# Patient Record
Sex: FEMALE | Race: WHITE | NOT HISPANIC OR LATINO | Employment: FULL TIME | URBAN - METROPOLITAN AREA
[De-identification: names, ages, dates, MRNs, and addresses within clinical notes are randomized per-mention and may not be internally consistent; named-entity substitution may affect disease eponyms.]

---

## 2017-07-05 ENCOUNTER — OFFICE VISIT (OUTPATIENT)
Dept: OBGYN | Facility: CLINIC | Age: 27
End: 2017-07-05
Payer: COMMERCIAL

## 2017-07-05 VITALS
HEART RATE: 96 BPM | BODY MASS INDEX: 35.95 KG/M2 | TEMPERATURE: 97.9 F | HEIGHT: 68 IN | SYSTOLIC BLOOD PRESSURE: 131 MMHG | WEIGHT: 237.2 LBS | DIASTOLIC BLOOD PRESSURE: 83 MMHG

## 2017-07-05 DIAGNOSIS — N91.2 ABSENCE OF MENSTRUATION: Primary | ICD-10-CM

## 2017-07-05 LAB
B-HCG SERPL-ACNC: ABNORMAL IU/L (ref 0–5)
BETA HCG QUAL IFA URINE: POSITIVE

## 2017-07-05 PROCEDURE — 99203 OFFICE O/P NEW LOW 30 MIN: CPT | Performed by: NURSE PRACTITIONER

## 2017-07-05 PROCEDURE — 84702 CHORIONIC GONADOTROPIN TEST: CPT | Performed by: NURSE PRACTITIONER

## 2017-07-05 PROCEDURE — 36415 COLL VENOUS BLD VENIPUNCTURE: CPT | Performed by: NURSE PRACTITIONER

## 2017-07-05 PROCEDURE — 84703 CHORIONIC GONADOTROPIN ASSAY: CPT | Performed by: NURSE PRACTITIONER

## 2017-07-05 ASSESSMENT — PAIN SCALES - GENERAL: PAINLEVEL: NO PAIN (0)

## 2017-07-05 NOTE — PROGRESS NOTES
S: Pt is a 27 year old,  2 para 1 who presents today with absence of menses. LMP-unsure-positive home test 1 week ago.  Nothing for contraception. Not actively planning, but not preventing pregnancy. Happy to be pregnant.  Complains of nausea.  Previous Pap smear 2013.-NIL Prior history of PAUL 2 and 3-no follow up since 2013.  Pertinent Past Obstetric and Gynecological Medical History:  x 1, SAB x 1   Patient past medical, surgical, family, and social history reviewed.    O: General appearance: Well appearing female in no acute distress. Answers questions and maintains eye contact appropriately.  RESPIRATORY: Clear to auscultation bilaterally.  CV: Regular rate and rhythm without murmur, gallop, rub  ABDOMEN: Soft, nontender, nondistended, normoactive bowel sounds. No hepatosplenomegaly. No guarding, rebounding, or rigidity.  UPT Positive    A: Missed Menses  Weeks gestation: ultrasound to determine  MARIA ALEJANDRA: ultrasound to determine    P: 1) Prenatal vitamins - will start  2) Diet, exercise, work, and environmental hazards reviewed. Discussed delivery hospital, providers, prenatal visit schedule. Quant HCG today to get baseline GA and if high enough, early ultrasound ordered to confirm dates. Toxoplasmosis precautions NA. Discussed avoidance of tobacco, ETOH, and street drugs. Signs and symptoms to monitor for and report discussed. Will schedule first OB visit at 10-12 weeks gestation. Discussed nausea relief measures and when to call if they are not helping or if she has vomiting.     Rain BUSCH CNP

## 2017-07-05 NOTE — NURSING NOTE
"Chief Complaint   Patient presents with     Confirmation Of Pregnancy     + home upt       Initial /83  Pulse 96  Temp 97.9  F (36.6  C) (Oral)  Ht 5' 8\" (1.727 m)  Wt 237 lb 3.2 oz (107.6 kg)  LMP  (LMP Unknown)  BMI 36.07 kg/m2 Estimated body mass index is 36.07 kg/(m^2) as calculated from the following:    Height as of this encounter: 5' 8\" (1.727 m).    Weight as of this encounter: 237 lb 3.2 oz (107.6 kg)..  BP completed using cuff size: regular on forearm      Leah Puente CMA    "

## 2017-07-05 NOTE — MR AVS SNAPSHOT
After Visit Summary   7/5/2017    Naheed Crouch    MRN: [id number]           Patient Information     Date Of Birth          1990        Visit Information        Provider Department      7/5/2017 8:50 AM Rain Yip APRN CNP [facility]        Today's Diagnoses     Absence of menstruation    -  1       Follow-ups after your visit        Future tests that were ordered for you today     Open Future Orders        Priority Expected Expires Ordered    US OB < 14 Weeks Single Routine  8/19/2017 7/5/2017            Who to contact     If you have questions or need follow up information about today's clinic visit or your schedule please contact [facility] directly at [phone].  Normal or non-critical lab and imaging results will be communicated to you by MyChart, letter or phone within 4 business days after the clinic has received the results. If you do not hear from us within 7 days, please contact the clinic through [employer] or phone. If you have a critical or abnormal lab result, we will notify you by phone as soon as possible.  Submit refill requests through IQMS or call your pharmacy and they will forward the refill request to us. Please allow 3 business days for your refill to be completed.          Additional Information About Your Visit        MyChart Information     IQMS gives you secure access to your electronic health record. If you see a primary care provider, you can also send messages to your care team and make appointments. If you have questions, please call your primary care clinic.  If you do not have a primary care provider, please call [phone] and they will assist you.        Care EveryWhere ID     This is your Care EveryWhere ID. This could be used by other organizations to access your [place] medical records  [id number]        Your Vitals Were     Pulse Temperature Height Last Period BMI (Body Mass Index)       96 97.9  F (36.6  " C) (Oral) 5' 8\" (1.727 m) (LMP Unknown) 36.07 kg/m2        Blood Pressure from Last 3 Encounters:   07/05/17 131/83   07/15/13 108/60   06/12/13 120/75    Weight from Last 3 Encounters:   07/05/17 237 lb 3.2 oz (107.6 kg)   07/15/13 242 lb 12.8 oz (110.1 kg)   06/12/13 242 lb (109.8 kg)              We Performed the Following     Beta HCG qual IFA urine     HCG quantitative pregnancy          Today's Medication Changes          These changes are accurate as of: 7/5/17  9:10 AM.  If you have any questions, ask your nurse or doctor.               Stop taking these medicines if you haven't already. Please contact your care team if you have questions.     NO ACTIVE MEDICATIONS   Stopped by:  Rain Yip APRN CNP                    Primary Care Provider    Md Other Clinic                Equal Access to Services     [facility]: Hadii ryan Piña, waaxda constantine, elizabethybashley kaalmabob bee, kevin waldron . So [facility] [phone].    ATENCIÓN: Si habla español, tiene a ledezma disposición servicios gratuitos de asistencia lingüística. Llame al [phone].    We comply with applicable federal civil rights laws and [place] laws. We do not discriminate on the basis of race, color, national origin, age, disability sex, sexual orientation or gender identity.            Thank you!     Thank you for choosing [facility]  [facility] your [facility]. Our goal is always to provide you with excellent care. Hearing back from our patients is one way we can continue to improve our services. Please take a few minutes to complete the written survey that you may receive in the mail after your visit with us. Thank you!             Your Updated Medication List - Protect others around you: Learn how to safely use, store and throw away your medicines at www.disposemymeds.org.      Notice  As of 7/5/2017  9:10 AM    You have not been prescribed any medications.      "

## 2017-07-06 ENCOUNTER — RADIANT APPOINTMENT (OUTPATIENT)
Dept: ULTRASOUND IMAGING | Facility: CLINIC | Age: 27
End: 2017-07-06
Attending: NURSE PRACTITIONER
Payer: COMMERCIAL

## 2017-07-06 DIAGNOSIS — N91.2 ABSENCE OF MENSTRUATION: ICD-10-CM

## 2017-07-06 PROCEDURE — 76801 OB US < 14 WKS SINGLE FETUS: CPT

## 2017-07-28 ENCOUNTER — TELEPHONE (OUTPATIENT)
Dept: OBGYN | Facility: CLINIC | Age: 27
End: 2017-07-28

## 2017-07-28 NOTE — TELEPHONE ENCOUNTER
Patient calling is 11 weeks pregnant and had some vaginal bleeding last night has stopped now, no other symptoms.  Would like a call back from nurse as soon as possible please.

## 2017-07-28 NOTE — TELEPHONE ENCOUNTER
"MARIA ALEJANDRA 02-17-18 10w 6d  Return call to patient. Patient stated she was walking through the store last noc and had \"one gush of blood that was reddish/brownish\" so she cleaned up in the public restroom and \"it seemed like a lot but probably not really.\" Patient stated she has had no abdominal cramping/pain. No other sx's. No recent IC-probably a wk ago. Explained no red flag sx's at this time. Recommended pelvic rest. Recommended she call back if sx's change. Recommended she schedule an appt for FOB. Scheduled per request with NARAYAN Fong, CNP on 08-02-17 at 1350. Patient appreciative of assistance. Elo Gutierrez, RN, BAN      "

## 2017-07-30 ENCOUNTER — NURSE TRIAGE (OUTPATIENT)
Dept: NURSING | Facility: CLINIC | Age: 27
End: 2017-07-30

## 2017-07-30 NOTE — TELEPHONE ENCOUNTER
", 11 wks MARIA ALEJANDRA 18.  Had gush of dark brownish-red blood x1 on . See note from . Today for past 30 min pt having red vaginal bleeding. More than spotting but less than a period. \"about 30 drops\"  Denies any abdominal pain/cramping. Says she has chronic lower backache but no increase or change in this whatsoever. No clots or tissue. Denies feeling dizzy or lightheaded, no other sx. US at 8 wks showed intrauterine pregnancy. Will see OB doctor tomorrow. Will call back tonight if abd/pelvic/back pain, increased bleeding or any other change. Elmira Miller RN/FNA    Reason for Disposition    MILD vaginal bleeding (i.e., clots or similar to menstrual period; not just spotting)    Additional Information    Negative: Shock suspected (e.g., cold/pale/clammy skin, too weak to stand, low BP, rapid pulse)    Negative: Difficult to awaken or acting confused  (e.g., disoriented, slurred speech)    Negative: Passed out (i.e., lost consciousness, collapsed and was not responding)    Negative: Sounds like a life-threatening emergency to the triager    Negative: [1] Vaginal bleeding AND [2] pregnant > 20 weeks    Negative: Not pregnant or pregnancy status unknown    Negative: SEVERE abdominal pain    Negative: [1] SEVERE vaginal bleeding (i.e., soaking 2 pads / hour, large blood clots) AND [2] present 2 or more hours    Negative: [1] MODERATE vaginal bleeding (i.e., soaking 1 pad / hour; clots) AND [2] present > 6 hours    Negative: [1] MODERATE vaginal bleeding (i.e., soaking 1 pad / hour; clots) AND [2] pregnant > 12 weeks    Negative: Passed tissue (e.g., gray-white)    Negative: Shoulder pain    Negative: Pale skin (pallor) of new onset or worsening    Negative: Patient sounds very sick or weak to the triager    Negative: [1] Constant abdominal pain AND [2] present > 2 hours    Negative: Fever > 100.4 F (38.0 C)    Negative: [1] Intermittent lower abdominal pain (e.g., cramping) AND [2] present > 24 hours    " "Negative: Prior history of \"ectopic pregnancy\" or previous tubal surgery (e.g., tubal ligation)    Negative: Burning with urination    Negative: Has IUD    Protocols used: PREGNANCY - VAGINAL BLEEDING LESS THAN 20 WEEKS [facility]    "

## 2017-07-31 ENCOUNTER — PRENATAL OFFICE VISIT (OUTPATIENT)
Dept: OBGYN | Facility: CLINIC | Age: 27
End: 2017-07-31
Payer: COMMERCIAL

## 2017-07-31 ENCOUNTER — TELEPHONE (OUTPATIENT)
Dept: OBGYN | Facility: CLINIC | Age: 27
End: 2017-07-31

## 2017-07-31 ENCOUNTER — RESULT FOLLOW UP (OUTPATIENT)
Dept: OBGYN | Facility: CLINIC | Age: 27
End: 2017-07-31

## 2017-07-31 VITALS
TEMPERATURE: 97.8 F | WEIGHT: 238.2 LBS | DIASTOLIC BLOOD PRESSURE: 75 MMHG | HEIGHT: 68 IN | BODY MASS INDEX: 36.1 KG/M2 | HEART RATE: 87 BPM | SYSTOLIC BLOOD PRESSURE: 123 MMHG

## 2017-07-31 DIAGNOSIS — Z34.80 ENCOUNTER FOR SUPERVISION OF OTHER NORMAL PREGNANCY: Primary | ICD-10-CM

## 2017-07-31 DIAGNOSIS — D06.9 SEVERE DYSPLASIA OF CERVIX (CIN III): ICD-10-CM

## 2017-07-31 DIAGNOSIS — D06.9 SEVERE DYSPLASIA OF CERVIX: ICD-10-CM

## 2017-07-31 LAB
BASOPHILS # BLD AUTO: 0 10E9/L (ref 0–0.2)
BASOPHILS NFR BLD AUTO: 0.4 %
DIFFERENTIAL METHOD BLD: ABNORMAL
EOSINOPHIL # BLD AUTO: 0.1 10E9/L (ref 0–0.7)
EOSINOPHIL NFR BLD AUTO: 2 %
ERYTHROCYTE [DISTWIDTH] IN BLOOD BY AUTOMATED COUNT: 16.8 % (ref 10–15)
HCT VFR BLD AUTO: 33.9 % (ref 35–47)
HGB BLD-MCNC: 11.1 G/DL (ref 11.7–15.7)
LYMPHOCYTES # BLD AUTO: 2.2 10E9/L (ref 0.8–5.3)
LYMPHOCYTES NFR BLD AUTO: 31 %
MCH RBC QN AUTO: 28 PG (ref 26.5–33)
MCHC RBC AUTO-ENTMCNC: 32.7 G/DL (ref 31.5–36.5)
MCV RBC AUTO: 85 FL (ref 78–100)
MONOCYTES # BLD AUTO: 0.4 10E9/L (ref 0–1.3)
MONOCYTES NFR BLD AUTO: 6.2 %
NEUTROPHILS # BLD AUTO: 4.2 10E9/L (ref 1.6–8.3)
NEUTROPHILS NFR BLD AUTO: 60.4 %
PLATELET # BLD AUTO: 278 10E9/L (ref 150–450)
RBC # BLD AUTO: 3.97 10E12/L (ref 3.8–5.2)
WBC # BLD AUTO: 7 10E9/L (ref 4–11)

## 2017-07-31 PROCEDURE — 87086 URINE CULTURE/COLONY COUNT: CPT | Performed by: NURSE PRACTITIONER

## 2017-07-31 PROCEDURE — 86900 BLOOD TYPING SEROLOGIC ABO: CPT | Performed by: NURSE PRACTITIONER

## 2017-07-31 PROCEDURE — 86901 BLOOD TYPING SEROLOGIC RH(D): CPT | Performed by: NURSE PRACTITIONER

## 2017-07-31 PROCEDURE — 36415 COLL VENOUS BLD VENIPUNCTURE: CPT | Performed by: NURSE PRACTITIONER

## 2017-07-31 PROCEDURE — 87389 HIV-1 AG W/HIV-1&-2 AB AG IA: CPT | Performed by: NURSE PRACTITIONER

## 2017-07-31 PROCEDURE — 87624 HPV HI-RISK TYP POOLED RSLT: CPT | Performed by: NURSE PRACTITIONER

## 2017-07-31 PROCEDURE — 87491 CHLMYD TRACH DNA AMP PROBE: CPT | Performed by: NURSE PRACTITIONER

## 2017-07-31 PROCEDURE — 86780 TREPONEMA PALLIDUM: CPT | Performed by: NURSE PRACTITIONER

## 2017-07-31 PROCEDURE — 87340 HEPATITIS B SURFACE AG IA: CPT | Performed by: NURSE PRACTITIONER

## 2017-07-31 PROCEDURE — G0145 SCR C/V CYTO,THINLAYER,RESCR: HCPCS | Performed by: NURSE PRACTITIONER

## 2017-07-31 PROCEDURE — 86762 RUBELLA ANTIBODY: CPT | Performed by: NURSE PRACTITIONER

## 2017-07-31 PROCEDURE — 86850 RBC ANTIBODY SCREEN: CPT | Performed by: NURSE PRACTITIONER

## 2017-07-31 PROCEDURE — 99207 ZZC FIRST OB VISIT: CPT | Performed by: NURSE PRACTITIONER

## 2017-07-31 PROCEDURE — 85025 COMPLETE CBC W/AUTO DIFF WBC: CPT | Performed by: NURSE PRACTITIONER

## 2017-07-31 PROCEDURE — 87591 N.GONORRHOEAE DNA AMP PROB: CPT | Performed by: NURSE PRACTITIONER

## 2017-07-31 ASSESSMENT — PAIN SCALES - GENERAL: PAINLEVEL: NO PAIN (0)

## 2017-07-31 NOTE — NURSING NOTE
"Chief Complaint   Patient presents with     Prenatal Care     FOB     Vaginal Bleeding     x 4 days       Initial /75  Pulse 87  Temp 97.8  F (36.6  C) (Oral)  Ht 5' 8\" (1.727 m)  Wt 238 lb 3.2 oz (108 kg)  LMP  (LMP Unknown)  BMI 36.22 kg/m2 Estimated body mass index is 36.22 kg/(m^2) as calculated from the following:    Height as of this encounter: 5' 8\" (1.727 m).    Weight as of this encounter: 238 lb 3.2 oz (108 kg)..  BP completed using cuff size: large    [facility] expectant family book and hospital information given to patient      Leah Russroxann CMA      "

## 2017-07-31 NOTE — LETTER
August 9, 2018      Naheed Coleman  [address] [address] [address]    Dear ,      This letter is to remind you that you are due for your follow up PAP smear and HPV test on or about 7/31/18.    Please call [phone] to schedule your appointment at your earliest convenience.     If you have completed the tests outside of [place], please have the results forwarded to our office. We will update the chart for your primary Physician to review before your next annual physical.     Sincerely,      NARAYAN Rebolledo CNP/rlm

## 2017-07-31 NOTE — TELEPHONE ENCOUNTER
Patient is 11 week 1 day based on the previous ob ultrasound.  She had a gush of blood 3 days ago when she was out shopping   Yesterday she noticed a small amount of spotting.  The blood was bright red at the beginning but is now dark brown in color.  She denied pelvic pain, cramping or passing blood clots.  I advised pelvic rest including no intercourse or tampons, no heavy lifting or strenuous exercise.  I placed patient with Rain today for the new prenatal visit.  I cancelled her appointment originally scheduled for Wednesday.  Patient denied having intercourse in the past week.  Julissa Cid RN

## 2017-07-31 NOTE — PROGRESS NOTES
Naheed is a 27 year old  @ 11 weeks here for new OB visit.  She had called with a gush of bleeding a few days ago-that was bright red and had nothing further. Yesterday noticed a small amount of spotting that was bright red, dark brown by night. No recent intercourse, does lift her son, but nothing more than that.   She is unsure of her blood type.     See OB questionnaire for pertinent components of HPI.    OBhx:  x 1  SAB x 1  Gyne: prior LEEP for PAUL 3 in .  Past Medical History:   Diagnosis Date     ASCUS with positive high risk HPV 2012    types 16, 53 & 59     H/O colposcopy with cervical biopsy 2013    PAUL 2 & 3     Tobacco use disorder      Past Surgical History:   Procedure Laterality Date     LEEP TX, CERVICAL  2013    PAUL 3     Patient Active Problem List    Diagnosis Date Noted     Encounter for supervision of other normal pregnancy 2017     Priority: Medium     Missed  07/15/2013     Priority: Medium     Severe dysplasia of cervix 2013     Priority: Medium     Tobacco use disorder      Priority: Medium     ASCUS on Pap smear, HPV positive, repeat PP 2012     Priority: Medium     ASCUS Pap, + HPV at previous clinic.   12:Pap - ASCUS, + HR HPV 16, 53 & 59. Plan colp-  13:[place] - PAUL 2 & 3. Plan LEEP  13:LEEP - PAUL 3. Repeat pap 3 months. Reminder in epic.   13:Pap - NIL. Repeat pap 6 months. Reminder in epic.             Allergies   Allergen Reactions     Amoxicillin Nausea and Vomiting     Current Outpatient Prescriptions   Medication Sig Dispense Refill     Prenatal Vit-Fe Fumarate-FA (PRENATAL VITAMIN PO) Take 1 tablet by mouth daily         Review of Systems:     CONSTITUTIONAL:NEGATIVE for fever, chills, change in weight  INTEGUMENTARY/SKIN: NEGATIVE for worrisome rashes, moles or lesions  EYES: NEGATIVE for vision changes or irritation  ENT/MOUTH: NEGATIVE for ear, mouth and throat problems  RESP:NEGATIVE for significant cough or  "SOB  BREAST: NEGATIVE for masses, tenderness or discharge  CV: NEGATIVE for chest pain, palpitations or peripheral edema  GI: NEGATIVE for nausea, abdominal pain, heartburn, or change in bowel habits  : See above  NEURO: NEGATIVE for weakness, dizziness or paresthesias  HEME/ALLERGY/IMMUNE: NEGATIVE for bleeding problems  PSYCHIATRIC: NEGATIVE for changes in mood or affect      Past Medical History of Father of Baby: No significant medical history  History Since Last Menstrual Period: Bleeding-see above    Physical Exam: /75  Pulse 87  Temp 97.8  F (36.6  C) (Oral)  Ht 5' 8\" (1.727 m)  Wt 238 lb 3.2 oz (108 kg)  LMP  (LMP Unknown)  BMI 36.22 kg/m2  General: Well developed, well nourished female  Skin: Normal  HEENT: Normal  Neck: Supple,no adenopathy,thyroid normal  Chest: Clear to auscultation  Heart: Regular rate, rhythm,No murmur, rub, gallop  Abdomen: Benign and No masses, organomegaly    Extremities: Normal  Neurological: Normal   Perineum: Intact   Vulva: Normal  Vagina: Normal mucosa, scant blood  Cervix: Parous, closed, mobile, no discharge  Uterus: 11 weeks, Normal shape, position and consistency   Adnexa: Normal  Bony Pelvis: Adequate     A/P 27 year old  at 11 weeks  Discussed physician coverage, tertiary support, diet, exercise, weight gain, schedule of visits, routine and indicated ultrasounds, and childbirth education.  Prenatal labs: Prenatal Panel, GC, Chlamydia, Urine Culture, Pap smear.  Continue taking prenatal vitamins  Discussed maternal quad screening between 15-20 weeks and reviewed benefits and limitations.    Patient is reassured upon hearing FHTs. We discussed red flag symptoms to monitor for and report. Reviewed when she would need to proceed to ED. Discussed no intercourse, heavy lifting (other than son), strenuous activity for the next 2 weeks. Await blood type to determine if we need to give Rhogam for the bleeding. Patient is given an opportunity to ask questions " and have them answered.    Also reviewed her history of LEEP and questions discussed.     Rain BUSCH CNP

## 2017-07-31 NOTE — MR AVS SNAPSHOT
"              After Visit Summary   7/31/2017    Naheed Crouch    MRN: [id number]           Patient Information     Date Of Birth          1990        Visit Information        Provider Department      7/31/2017 12:10 PM Rain Yip APRN CNP [facility]        Today's Diagnoses     Encounter for supervision of other normal pregnancy    -  1    Severe dysplasia of cervix           Follow-ups after your visit        Who to contact     If you have questions or need follow up information about today's clinic visit or your schedule please contact [facility] directly at [phone].  Normal or non-critical lab and imaging results will be communicated to you by 8aweekhart, letter or phone within 4 business days after the clinic has received the results. If you do not hear from us within 7 days, please contact the clinic through 8aweekhart or phone. If you have a critical or abnormal lab result, we will notify you by phone as soon as possible.  Submit refill requests through [employer] or call your pharmacy and they will forward the refill request to us. Please allow 3 business days for your refill to be completed.          Additional Information About Your Visit        MyChart Information     [employer] gives you secure access to your electronic health record. If you see a primary care provider, you can also send messages to your care team and make appointments. If you have questions, please call your primary care clinic.  If you do not have a primary care provider, please call [phone] and they will assist you.        Care EveryWhere ID     This is your Care EveryWhere ID. This could be used by other organizations to access your [place] medical records  [id number]        Your Vitals Were     Pulse Temperature Height Last Period BMI (Body Mass Index)       87 97.8  F (36.6  C) (Oral) 5' 8\" (1.727 m) (LMP Unknown) 36.22 kg/m2        Blood Pressure from Last 3 Encounters:   07/31/17 " 123/75   07/05/17 131/83   07/15/13 108/60    Weight from Last 3 Encounters:   07/31/17 238 lb 3.2 oz (108 kg)   07/05/17 237 lb 3.2 oz (107.6 kg)   07/15/13 242 lb 12.8 oz (110.1 kg)              We Performed the Following     ABO/Rh type and screen     Anti Treponema     CBC with platelets differential     Chlamydia trachomatis PCR     Hepatitis B surface antigen     HIV Antigen Antibody Combo     HPV High Risk Types DNA Cervical     Neisseria gonorrhoeae PCR     Pap imaged thin layer diagnostic only     Rubella Antibody IgG Quantitative     Urine Culture Aerobic Bacterial        Primary Care Provider    Md Other Clinic                Equal Access to Services     JACKI BROWN : Hadmonica Piña, christal fitch, jarocho bee, kevin waldron . So [facility] [phone].    ATENCIÓN: Si habla español, tiene a ledezma disposición servicios gratuitos de asistencia lingüística. Llame al [phone].    We comply with applicable federal civil rights laws and [place] laws. We do not discriminate on the basis of race, color, national origin, age, disability sex, sexual orientation or gender identity.            Thank you!     [facility] you for [facility]  for your care. Our goal is always to provide you with excellent care. Hearing back from our patients is one way we can continue to improve our services. Please take a few minutes to complete the written survey that you may receive in the mail after your visit with us. Thank you!             Your Updated Medication List - Protect others around you: Learn how to safely use, store and throw away your medicines at www.disposemymeds.org.          This list is accurate as of: 7/31/17  1:10 PM.  Always use your most recent med list.                   Brand Name Dispense Instructions for use Diagnosis    PRENATAL VITAMIN PO      Take 1 tablet by mouth daily

## 2017-08-01 LAB
ABO + RH BLD: NORMAL
ABO + RH BLD: NORMAL
BACTERIA SPEC CULT: NORMAL
BLD GP AB SCN SERPL QL: NORMAL
BLOOD BANK CMNT PATIENT-IMP: NORMAL
C TRACH DNA SPEC QL NAA+PROBE: NORMAL
HBV SURFACE AG SERPL QL IA: NONREACTIVE
HIV 1+2 AB+HIV1 P24 AG SERPL QL IA: NORMAL
MICRO REPORT STATUS: NORMAL
N GONORRHOEA DNA SPEC QL NAA+PROBE: NORMAL
RUBV IGG SERPL IA-ACNC: 15 IU/ML
SPECIMEN EXP DATE BLD: NORMAL
SPECIMEN SOURCE: NORMAL

## 2017-08-02 LAB
COPATH REPORT: NORMAL
PAP: NORMAL
T PALLIDUM IGG+IGM SER QL: NEGATIVE

## 2017-08-04 LAB
FINAL DIAGNOSIS: NORMAL
HPV HR 12 DNA CVX QL NAA+PROBE: NEGATIVE
HPV16 DNA SPEC QL NAA+PROBE: NEGATIVE
HPV18 DNA SPEC QL NAA+PROBE: NEGATIVE
SPECIMEN DESCRIPTION: NORMAL

## 2017-08-10 NOTE — PROGRESS NOTES
ASCUS Pap, + HPV at previous clinic.   11/6/12:Pap - ASCUS, + HR HPV 16, 53 & 59. Plan colp-  1/28/13:[place] - PAUL 2 & 3. Plan LEEP  2/25/13:LEEP - PAUL 3. Repeat pap 3 months.    6/12/13:Pap - NIL. Repeat pap 6 months.    7/31/17 NIL Pap, neg HPV. Plan: cotest in 1 year  8/9/18 My Chart cotest reminder message sent ([facility])  02/18/19 [facility] clinic and schedule. ([facility])  03/04/19 Patient is lost to pap tracking follow-up. FYI routed to provider. ([facility])

## 2017-08-16 ENCOUNTER — MYC MEDICAL ADVICE (OUTPATIENT)
Dept: OBGYN | Facility: CLINIC | Age: 27
End: 2017-08-16

## 2017-08-17 NOTE — TELEPHONE ENCOUNTER
Noted patient was seen by NARAYAN Fong CNP on 07-31-17 and reported bleeding.   No future appt scheduled.  Will route to NARAYAN Fong CNP for review & orders. Elo Gutierrez RN, BAN

## 2017-08-30 ENCOUNTER — NURSE TRIAGE (OUTPATIENT)
Dept: NURSING | Facility: CLINIC | Age: 27
End: 2017-08-30

## 2017-08-31 ENCOUNTER — RADIANT APPOINTMENT (OUTPATIENT)
Dept: ULTRASOUND IMAGING | Facility: CLINIC | Age: 27
End: 2017-08-31
Attending: OBSTETRICS & GYNECOLOGY
Payer: COMMERCIAL

## 2017-08-31 ENCOUNTER — OFFICE VISIT (OUTPATIENT)
Dept: OBGYN | Facility: CLINIC | Age: 27
End: 2017-08-31
Payer: COMMERCIAL

## 2017-08-31 DIAGNOSIS — O41.8X20: ICD-10-CM

## 2017-08-31 DIAGNOSIS — Z34.80 ENCOUNTER FOR SUPERVISION OF OTHER NORMAL PREGNANCY: Primary | ICD-10-CM

## 2017-08-31 DIAGNOSIS — O20.0 THREATENED ABORTION: ICD-10-CM

## 2017-08-31 DIAGNOSIS — Z34.80 ENCOUNTER FOR SUPERVISION OF OTHER NORMAL PREGNANCY: ICD-10-CM

## 2017-08-31 DIAGNOSIS — N93.9 VAGINAL BLEEDING: ICD-10-CM

## 2017-08-31 PROCEDURE — 76816 OB US FOLLOW-UP PER FETUS: CPT

## 2017-08-31 PROCEDURE — 99214 OFFICE O/P EST MOD 30 MIN: CPT | Performed by: OBSTETRICS & GYNECOLOGY

## 2017-08-31 NOTE — PROGRESS NOTES
Naheed is a 27 year old  at 15.4 weeks, referred here by self for consultation regarding 2 weeks vaginal bleeding. Initially was dark brown.Yesterday it became bright red. She denies any pain or cramps.  Here with spouse.    ROS: Ten point review of systems was reviewed and negative except the above.    Gyne: - abn pap (last pap ), - STD's    Past Medical History:   Diagnosis Date     ASCUS with positive high risk HPV 2012    types 16, 53 & 59     H/O colposcopy with cervical biopsy 2013    PAUL 2 & 3     Tobacco use disorder      Past Surgical History:   Procedure Laterality Date     LEEP TX, CERVICAL  2013    PAUL 3     Patient Active Problem List   Diagnosis     Severe dysplasia of cervix (PAUL III)     Tobacco use disorder     Severe dysplasia of cervix     Missed      Encounter for supervision of other normal pregnancy       ALL/Meds: Her medication and allergy histories were reviewed and are documented in their appropriate chart areas.    SH: - tob, - EtOH,     FH: Her family history was reviewed and documented in its appropriate chart area.    PE: LMP  (LMP Unknown)  There is no height or weight on file to calculate BMI.      General:  WNWD female, NAD  Alert  Oriented x 3  Gait:  Normal  Skin:  Normal skin turgor  HEENT:  NC/AT, EOMI  Abdomen:  Non-tender, non-distended.  Vulva: No external lesions, normal hair distribution, no adenopathy  BUS:  Normal, no masses noted  Urethra:  No hypermobility seen  Urethral meatus:  No masses noted  Vagina:SSE:  Dark old blood.  Cervix:closed with dark small bleeding. Smooth, pink, no visible lesions  Uterus: Normal 15 wk, anteverted, non-tender, mobile    Extremities:  No clubbing, no cyanosis and no edema.    Positive FHR  ULTRASOUND OBSTETRIC SINGLE FOLLOW UP REPEAT  2017 1:56 PM     HISTORY: Encounter for supervision of other normal pregnancy,  unspecified trimester. Abnormal uterine and vaginal bleeding,  unspecified. Threatened  .     COMPARISON: First trimester ultrasound 2017.     FINDINGS:      Presentation: Breech.  Cardiac activity: 160 bpm. Regular rhythm.  Movement: Unremarkable.  Placenta: Anterior and fundal. No evidence for placenta previa.  Adnexa: Unremarkable.   Cervical length: 3.4 cm.   Amniotic fluid: Unremarkable.   MAUREEN: 9.0 cm.      Other findings: Subchorionic hemorrhage is noted measuring 4.2 x 4.8  x 1.7 cm.  A complete anatomy scan was not performed.      Measured parameters:       BPD:  3.2 cm      Age: 16 weeks 1 day.       HC:    11.5 cm      Age: 15 weeks 5 days.       AC:  9.7 cm      Age: 15 weeks 6 days.       FL:   2.0 cm      Age: 16 weeks 0 days.     Gestational age by current ultrasound measurement: 16 weeks 0 days,  corresponding to an MARIA ALEJANDRA of 2/15/2018.     Gestational age based on the reported previously established due date:  15 weeks 5 days, corresponding to an MARIA ALEJANDRA of 2018.     Estimated fetal weight: 138 grams, corresponding to the 52nd  percentile based on the reported previously established due date.          IMPRESSION:    1. Single live intrauterine pregnancy of 16 weeks 0 days gestation by  current ultrasound measurement. Fetal growth is 2 days more advanced  than what is expected from the reported previously established due  date.  2. Subchorionic hemorrhage. Follow-up as clinically warranted.      Findings were called to Dr. Agbeh by the ultrasound technologist at  the time of the exam, 1:40 PM on 2017.     CEZAR GREWAL MD    A/P      ICD-10-CM    1. Encounter for supervision of other normal pregnancy Z34.80 US OB Ltd One Or More Fetus FU/Repeat   2. Vaginal bleeding N93.9 US OB Ltd One Or More Fetus FU/Repeat   3. Threatened  O20.0 US OB Ltd One Or More Fetus FU/Repeat   4. Subchorionic hemorrhage, second trimester, not applicable or unspecified fetus O41.8X20      Discussed ultrasound and pelvic exam .  Pelvic rest.  Limit lifting to 10 lb.    return to clinic 1-2  weeks for next OBV.    25 minutes was spent face to face with the patient today discussing her history, diagnosis, and follow-up plan as noted above.  Over 50% of the visit was spent in counseling and coordination of care.    Total Visit Time: 30 minutes.      CEPHAS AGBEH, MD.      CEPHAS AGBEH, MD.

## 2017-08-31 NOTE — TELEPHONE ENCOUNTER
"  Reason for Disposition    MILD vaginal bleeding (i.e., clots or similar to menstrual period; not just spotting)    Additional Information    Negative: Shock suspected (e.g., cold/pale/clammy skin, too weak to stand, low BP, rapid pulse)    Negative: Difficult to awaken or acting confused  (e.g., disoriented, slurred speech)    Negative: Passed out (i.e., lost consciousness, collapsed and was not responding)    Negative: Sounds like a life-threatening emergency to the triager    Negative: [1] Vaginal bleeding AND [2] pregnant > 20 weeks    Negative: Not pregnant or pregnancy status unknown    Negative: SEVERE abdominal pain    Negative: [1] SEVERE vaginal bleeding (i.e., soaking 2 pads / hour, large blood clots) AND [2] present 2 or more hours    Negative: [1] MODERATE vaginal bleeding (i.e., soaking 1 pad / hour; clots) AND [2] present > 6 hours    Negative: [1] MODERATE vaginal bleeding (i.e., soaking 1 pad / hour; clots) AND [2] pregnant > 12 weeks    Negative: Passed tissue (e.g., gray-white)    Negative: Shoulder pain    Negative: Pale skin (pallor) of new onset or worsening    Negative: Patient sounds very sick or weak to the triager    Negative: [1] Constant abdominal pain AND [2] present > 2 hours    Negative: Fever > 100.4 F (38.0 C)    Negative: [1] Intermittent lower abdominal pain (e.g., cramping) AND [2] present > 24 hours    Negative: Prior history of \"ectopic pregnancy\" or previous tubal surgery (e.g., tubal ligation)    Negative: Burning with urination    Negative: Has IUD    Answer Assessment - Initial Assessment Questions  1. ONSET: \"When did this bleeding start?\"        08/15/17  2. DESCRIPTION: \"Describe the bleeding that you are having.\" \"How much bleeding is there?\"     - SPOTTING: spotting, or pinkish / brownish mucous discharge; does not fill panti-liner or pad     - MILD:  less than 1 pad / hour; less than patient's usual menstrual bleeding    - MODERATE: 1-2 pads / hour; small-medium blood " "clots (e.g., pea, grape, small coin)     - SEVERE: soaking 2 or more pads/hour for 2 or more hours; bleeding not contained by pads or continuous red blood from vagina; large blood clots (e.g., golf ball, large coin)       Mild dark red, was brown spotting from 08/15/17 to today when it seems to be darker and more than spotting  3. ABDOMINAL PAIN SEVERITY: If present, ask: \"How bad is it?\"  (e.g., Scale 1-10; mild, moderate, or severe)    - MILD (1-3): doesn't interfere with normal activities, abdomen soft and not tender to touch     - MODERATE (4-7): interferes with normal activities or awakens from sleep, tender to touch     - SEVERE (8-10): excruciating pain, doubled over, unable to do any normal activities      No pain  4. PREGNANCY: \"Do you know how many weeks or months pregnant you are?\" \"When was the first day of your last normal menstrual period?\"      15 weeks  5. HEMODYNAMIC STATUS: \"Are you weak or feeling lightheaded?\" If so, ask: \"Can you stand and walk normally?\"       No symptoms  6. OTHER SYMPTOMS: \"What other symptoms are you having with the bleeding?\" (e.g., passed tissue, vaginal discharge, fever, menstrual-type cramps)      No other symptoms, no passed tissue, no cramping, pain, just the bleeding.    Protocols used: PREGNANCY - VAGINAL BLEEDING LESS THAN 20 WEEKS EGA-ADULT-AH    She agreed to transfer to [facility] and got appointment tomorrow at 11:30 with provider at [facility].    Barb Stack, RN, BSN  [facility]    "

## 2017-09-01 ENCOUNTER — TELEPHONE (OUTPATIENT)
Dept: OBGYN | Facility: CLINIC | Age: 27
End: 2017-09-01

## 2017-09-01 NOTE — TELEPHONE ENCOUNTER
Patient called with vaginal bleeding and passing blood clots.  She is wearing an Always thin pad and has filled 1/2 the pad 2 times.  She noticed 3 blood clots in the toilet silver dollar size.  No tissue noted.  The bleeding has slowed now.  She denied abdominal pain or cramping.  She was seen in clinic yesterday and was given pelvic precautions.  She understands nothing placed in the vagina, no heavy lifting-greater than 10 pounds or strenuous exercise. Pateint is home resting.  She will continue to observe and call back with an update.   Julissa Cid RN

## 2017-09-01 NOTE — TELEPHONE ENCOUNTER
Patient states she saw you for bleeding yesterday and wants to know if the amount of bleeding she is having today is normal.  Please call.    Thank you.

## 2017-09-01 NOTE — TELEPHONE ENCOUNTER
Patient called back with an update.  Her flow is a very mild spotting now.  She will continue to monitor the bleeding and will call the clinic back if she has concerns.  I have already discussed pelvic rest which is what she will do.  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  Julissa Cid RN

## 2017-09-18 ENCOUNTER — PRENATAL OFFICE VISIT (OUTPATIENT)
Dept: OBGYN | Facility: CLINIC | Age: 27
End: 2017-09-18
Payer: COMMERCIAL

## 2017-09-18 VITALS
DIASTOLIC BLOOD PRESSURE: 66 MMHG | SYSTOLIC BLOOD PRESSURE: 124 MMHG | HEART RATE: 89 BPM | BODY MASS INDEX: 36.46 KG/M2 | TEMPERATURE: 97.7 F | OXYGEN SATURATION: 98 % | WEIGHT: 239.8 LBS

## 2017-09-18 DIAGNOSIS — Z34.80 ENCOUNTER FOR SUPERVISION OF OTHER NORMAL PREGNANCY: Primary | ICD-10-CM

## 2017-09-18 PROCEDURE — 99207 ZZC PRENATAL VISIT: CPT | Performed by: OBSTETRICS & GYNECOLOGY

## 2017-09-18 NOTE — PROGRESS NOTES
18w2d. Doing well without issues/concerns.  Quad screen not done per pt request .  Routine anticipatory guidance.  U/S  Ordered. return to clinic in 4 weeks.    ICD-10-CM    1. Encounter for supervision of other normal pregnancy Z34.80 US OB > 14 Weeks Complete Single     CEPHAS AGBEH, MD.

## 2017-09-18 NOTE — PATIENT INSTRUCTIONS
If you have any questions regarding your visit, Please contact your care team.    Women s Health CLINIC HOURS TELEPHONE NUMBER   Bridgets Agbeh, M.D.    Casandra Costa- RONEN Gasca - RONEN Daniel -         Monday-[facility]  8:00 am - 5 pm  Tuesday- [facility]  8:00am- 5 pm  Wednesday- Off  Thursday- [facility]  8:00 am- 5 pm  Friday-[place]  8:00 am 5 pm [facility]  [address] [place] [address]  [phone] ask for Women's [facility]    Imaging [phone]    [facility]  [address]  [place] [id number] [phone]  Imaging [phone]     Urgent Care locations:    [facility] Saturday and Sunday   9 am - 5 pm    Monday-Friday   12 pm - 8 pm  Saturday and Sunday   9 am - 5 pm   [phone]       If you need a medication refill, please contact your pharmacy. Please allow 3 business days for your refill to be completed.  As always, Thank you for trusting us with your healthcare needs!

## 2017-09-18 NOTE — NURSING NOTE
"Chief Complaint   Patient presents with     Prenatal Care     18.6       Initial /66 (BP Location: Left arm, Patient Position: Chair, Cuff Size: Adult Large)  Pulse 89  Temp 97.7  F (36.5  C) (Tympanic)  Wt 239 lb 12.8 oz (108.8 kg)  LMP  (LMP Unknown)  SpO2 98%  BMI 36.46 kg/m2 Estimated body mass index is 36.46 kg/(m^2) as calculated from the following:    Height as of 7/31/17: 5' 8\" (1.727 m).    Weight as of this encounter: 239 lb 12.8 oz (108.8 kg).  Medication Reconciliation: complete     Casandra Means LPN    "

## 2017-09-18 NOTE — MR AVS SNAPSHOT
After Visit Summary   9/18/2017    Naheed Crouch    MRN: [id number]           Patient Information     Date Of Birth          1990        Visit Information        Provider Department      9/18/2017 4:30 PM Agbeh, Cephas Mawuena, MD [facility]        Care Instructions                                                           If you have any questions regarding your visit, Please contact your care team.    Women s Health CLINIC HOURS TELEPHONE NUMBER   Cephas Agbeh, M.D.    Casandra - GRACE Costa- RONEN Gasca - RN    María -         Monday-[facility]  8:00 am - 5 pm  Tuesday- [facility]  8:00am- 5 pm  Wednesday- Off  Thursday- [facility]  8:00 am- 5 pm  Friday-[place]  8:00 am 5 pm [facility]  [address] [place] [address]  [phone] ask for Women's [facility]    Imaging [phone]    [facility]  [address]  [place] [address]  [phone]  Imaging [phone]     Urgent Care locations:    [facility] Saturday and Sunday   9 am - 5 pm    Monday-Friday   12 pm - 8 pm  Saturday and Sunday   9 am - 5 pm   [phone]       If you need a medication refill, please contact your pharmacy. Please allow 3 business days for your refill to be completed.  As always, Thank you for trusting us with your healthcare needs!                 Follow-ups after your visit        Your next 10 appointments already scheduled     Sep 18, 2017  4:30 PM CDT   MyChart OB-GYN Established Prenatal with Cephas Mawuena Agbeh, MD   [facility] ([facility])    [address]  [place] [id number] [phone]            Oct 19, 2017 11:45 AM CDT   ESTABLISHED PRENATAL with Cephas Mawuena Agbeh, MD   [facility] [place] ([facility])    [address]  [place] [id number] [phone]              Who to contact      If you have questions or need follow up information about today's clinic visit or your schedule please contact [facility] [place] directly at [phone].  Normal or non-critical lab and imaging results will be communicated to you by MyChart, letter or phone within 4 business days after the clinic has received the results. If you do not hear from us within 7 days, please contact the clinic through [employer] or phone. If you have a critical or abnormal lab result, we will notify you by phone as soon as possible.  Submit refill requests through [employer] or call your pharmacy and they will forward the refill request to us. Please allow 3 business days for your refill to be completed.          Additional Information About Your Visit        [employer] Information     [employer] gives you secure access to your electronic health record. If you see a primary care provider, you can also send messages to your care team and make appointments. If you have questions, please call your primary care clinic.  If you do not have a primary care provider, please call [phone] and they will assist you.        Care EveryWhere ID     This is your Care EveryWhere ID. This could be used by other organizations to access your [place] medical records  [id number]        Your Vitals Were     Pulse Temperature Last Period Pulse Oximetry BMI (Body Mass Index)       89 97.7  F (36.5  C) (Tympanic) (LMP Unknown) 98% 36.46 kg/m2        Blood Pressure from Last 3 Encounters:   09/18/17 124/66   07/31/17 123/75   07/05/17 131/83    Weight from Last 3 Encounters:   09/18/17 239 lb 12.8 oz (108.8 kg)   07/31/17 238 lb 3.2 oz (108 kg)   07/05/17 237 lb 3.2 oz (107.6 kg)              Today, you had the following     No orders found for display       Primary Care Provider    Provider Not In System                Equal Access to Services     JACKI BROWN : christal Alcantara, kevin reese  ernestine waldron ah. So [facility] [phone].    ATENCIÓN: Si habla juliano, tiene a ledezma disposición servicios gratuitos de asistencia lingüística. Andrew al [phone].    We comply with applicable federal civil rights laws and [place] laws. We do not discriminate on the basis of race, color, national origin, age, disability sex, sexual orientation or gender identity.            Thank you!     Thank you for choosing [facility]  for your care. Our goal is always to provide you with excellent care. Hearing back from our patients is one way we can continue to improve our services. Please take a few minutes to complete the written survey that you may receive in the mail after your visit with us. Thank you!             Your Updated Medication List - Protect others around you: Learn how to safely use, store and throw away your medicines at www.disposemymeds.org.          This list is accurate as of: 9/18/17  4:02 PM.  Always use your most recent med list.                   Brand Name Dispense Instructions for use Diagnosis    PRENATAL VITAMIN PO      Take 1 tablet by mouth daily

## 2017-09-28 ENCOUNTER — RADIANT APPOINTMENT (OUTPATIENT)
Dept: ULTRASOUND IMAGING | Facility: CLINIC | Age: 27
End: 2017-09-28
Attending: OBSTETRICS & GYNECOLOGY
Payer: COMMERCIAL

## 2017-09-28 DIAGNOSIS — Z34.80 ENCOUNTER FOR SUPERVISION OF OTHER NORMAL PREGNANCY: ICD-10-CM

## 2017-09-28 PROCEDURE — 76805 OB US >/= 14 WKS SNGL FETUS: CPT

## 2017-10-19 ENCOUNTER — PRENATAL OFFICE VISIT (OUTPATIENT)
Dept: OBGYN | Facility: CLINIC | Age: 27
End: 2017-10-19
Payer: COMMERCIAL

## 2017-10-19 VITALS
DIASTOLIC BLOOD PRESSURE: 69 MMHG | OXYGEN SATURATION: 100 % | HEART RATE: 91 BPM | TEMPERATURE: 97.9 F | SYSTOLIC BLOOD PRESSURE: 112 MMHG | BODY MASS INDEX: 36.28 KG/M2 | WEIGHT: 238.6 LBS

## 2017-10-19 DIAGNOSIS — Z34.82 ENCOUNTER FOR SUPERVISION OF OTHER NORMAL PREGNANCY IN SECOND TRIMESTER: Primary | ICD-10-CM

## 2017-10-19 PROCEDURE — 99207 ZZC PRENATAL VISIT: CPT | Performed by: OBSTETRICS & GYNECOLOGY

## 2017-10-19 NOTE — PATIENT INSTRUCTIONS
If you have any questions regarding your visit, Please contact your care team.    Women s Health CLINIC HOURS TELEPHONE NUMBER   Bridgets Agbeh, M.D.    Casandra Costa- RONEN Gasca - RONEN Daniel -         Monday-[facility]  8:00 am - 5 pm  Tuesday- [facility]  8:00am- 5 pm  Wednesday- Off  Thursday- [facility]  8:00 am- 5 pm  Friday-[place]  8:00 am 5 pm [facility]  [address] [place] [address]  [phone] ask for Women's [facility]    Imaging [phone]    [facility]  [address]  [place] [id number] [phone]  Imaging [phone]     Urgent Care locations:    [facility] Saturday and Sunday   9 am - 5 pm    Monday-Friday   12 pm - 8 pm  Saturday and Sunday   9 am - 5 pm   [phone]       If you need a medication refill, please contact your pharmacy. Please allow 3 business days for your refill to be completed.  As always, Thank you for trusting us with your healthcare needs!

## 2017-10-19 NOTE — PROGRESS NOTES
22w5d  Doing well without issues/concerns.  Routine anticipatory guidance.  US was incomplete, F/U ordered.    Plan for  GCT, Hgb.  return to clinic in 4 weeks.    ICD-10-CM    1. Encounter for supervision of other normal pregnancy in second trimester Z34.82 US OB Ltd One Or More Fetus FU/Repeat     CEPHAS AGBEH, MD.

## 2017-10-19 NOTE — NURSING NOTE
"Chief Complaint   Patient presents with     Prenatal Care     22.5       Initial /69 (BP Location: Left arm, Patient Position: Chair, Cuff Size: Adult Large)  Pulse 91  Temp 97.9  F (36.6  C) (Tympanic)  Wt 238 lb 9.6 oz (108.2 kg)  LMP  (LMP Unknown)  SpO2 100%  BMI 36.28 kg/m2 Estimated body mass index is 36.28 kg/(m^2) as calculated from the following:    Height as of 7/31/17: 5' 8\" (1.727 m).    Weight as of this encounter: 238 lb 9.6 oz (108.2 kg).  Medication Reconciliation: complete   Casandra Means LPN    "

## 2017-11-13 ENCOUNTER — PRENATAL OFFICE VISIT (OUTPATIENT)
Dept: OBGYN | Facility: CLINIC | Age: 27
End: 2017-11-13
Payer: COMMERCIAL

## 2017-11-13 ENCOUNTER — RADIANT APPOINTMENT (OUTPATIENT)
Dept: ULTRASOUND IMAGING | Facility: CLINIC | Age: 27
End: 2017-11-13
Attending: OBSTETRICS & GYNECOLOGY
Payer: COMMERCIAL

## 2017-11-13 VITALS
SYSTOLIC BLOOD PRESSURE: 111 MMHG | TEMPERATURE: 98.1 F | OXYGEN SATURATION: 98 % | HEART RATE: 75 BPM | BODY MASS INDEX: 36.95 KG/M2 | DIASTOLIC BLOOD PRESSURE: 73 MMHG | WEIGHT: 243 LBS

## 2017-11-13 DIAGNOSIS — Z30.09 STERILIZATION CONSULT: ICD-10-CM

## 2017-11-13 DIAGNOSIS — Z34.82 ENCOUNTER FOR SUPERVISION OF OTHER NORMAL PREGNANCY IN SECOND TRIMESTER: ICD-10-CM

## 2017-11-13 DIAGNOSIS — Z34.82 ENCOUNTER FOR SUPERVISION OF OTHER NORMAL PREGNANCY IN SECOND TRIMESTER: Primary | ICD-10-CM

## 2017-11-13 LAB
GLUCOSE 1H P 50 G GLC PO SERPL-MCNC: 90 MG/DL (ref 60–129)
HGB BLD-MCNC: 10.9 G/DL (ref 11.7–15.7)

## 2017-11-13 PROCEDURE — 36415 COLL VENOUS BLD VENIPUNCTURE: CPT | Performed by: OBSTETRICS & GYNECOLOGY

## 2017-11-13 PROCEDURE — 99207 ZZC PRENATAL VISIT: CPT | Performed by: OBSTETRICS & GYNECOLOGY

## 2017-11-13 PROCEDURE — 82950 GLUCOSE TEST: CPT | Performed by: OBSTETRICS & GYNECOLOGY

## 2017-11-13 PROCEDURE — 00000218 ZZHCL STATISTIC OBHBG - HEMOGLOBIN: Performed by: OBSTETRICS & GYNECOLOGY

## 2017-11-13 PROCEDURE — 59425 ANTEPARTUM CARE ONLY: CPT | Performed by: OBSTETRICS & GYNECOLOGY

## 2017-11-13 PROCEDURE — 76816 OB US FOLLOW-UP PER FETUS: CPT

## 2017-11-13 PROCEDURE — 86780 TREPONEMA PALLIDUM: CPT | Performed by: OBSTETRICS & GYNECOLOGY

## 2017-11-13 NOTE — NURSING NOTE
"Chief Complaint   Patient presents with     Prenatal Care     26.2       Initial /73  Pulse 75  Temp 98.1  F (36.7  C) (Tympanic)  Wt 243 lb (110.2 kg)  LMP  (LMP Unknown)  SpO2 98%  BMI 36.95 kg/m2 Estimated body mass index is 36.95 kg/(m^2) as calculated from the following:    Height as of 7/31/17: 5' 8\" (1.727 m).    Weight as of this encounter: 243 lb (110.2 kg).  Medication Reconciliation: complete   Casandra Means LPN    "

## 2017-11-13 NOTE — PATIENT INSTRUCTIONS
If you have any questions regarding your visit, Please contact your care team.    Women s Health CLINIC HOURS TELEPHONE NUMBER   Bridgets Agbeh, M.D.    Casandra Costa- RONEN Gasca - RONEN Daniel -         Monday-[facility]  8:00 am - 5 pm  Tuesday- [facility]  8:00am- 5 pm  Wednesday- Off  Thursday- [facility]  8:00 am- 5 pm  Friday-[place]  8:00 am 5 pm [facility]  [address] [place] [address]  [phone] ask for Women's [facility]    Imaging [phone]    [facility]  [address]  [place] [address] [phone]  Imaging [phone]     Urgent Care locations:    [facility] Saturday and Sunday   9 am - 5 pm    Monday-Friday   12 pm - 8 pm  Saturday and Sunday   9 am - 5 pm   [phone]       If you need a medication refill, please contact your pharmacy. Please allow 3 business days for your refill to be completed.  As always, Thank you for trusting us with your healthcare needs!

## 2017-11-13 NOTE — PROGRESS NOTES
26w2d  Doing well without issues/concerns.  Routine anticipatory guidance.   Repeat US today..  Glucola given. Plan for  GCT, Hgb.Wants sterilization.   Reviewed different methods of sterilization including vasectomy, Essure and LSC TL. Reviewed pros/cons of different methods of sterilization. Reviewed that compared to female sterilization, male sterilization is an in office procedure and less surgically risky due to the external placement of the male reproductive organs. Reviewed that LSC TL requires general anesthesia, 2 small incisions, and is effective immediately without need for confirmatory testing. Reviewed that Essure is performed under sedation without incisions, but may take 3-6 months before full occlusion has taken effect (which is confirmed by HSG).    ACOG information provided. Will sign consent at next visit.in 4 weeks.    ICD-10-CM    1. Encounter for supervision of other normal pregnancy in second trimester Z34.82 Glucose tolerance gest screen 1 hour     OB hemoglobin     Anti Treponema   2. Sterilization consult Z30.09      CEPHAS AGBEH, MD.

## 2017-11-13 NOTE — MR AVS SNAPSHOT
After Visit Summary   11/13/2017    Naheed Crouch    MRN: [id number]           Patient Information     Date Of Birth          1990        Visit Information        Provider Department      11/13/2017 3:45 PM Agbeh, Cephas Mawuena, MD [facility] [place]        Today's Diagnoses     Encounter for supervision of other normal pregnancy in second trimester    -  1      Care Instructions                                                           If you have any questions regarding your visit, Please contact your care team.    Women s Health CLINIC HOURS TELEPHONE NUMBER   Cephas Agbeh, M.D.    Casandra Costa- RONEN Gasca - RN    María -         Monday-[facility]  8:00 am - 5 pm  Tuesday- [facility]  8:00am- 5 pm  Wednesday- Off  Thursday- [facility]  8:00 am- 5 pm  Friday-[place]  8:00 am 5 pm [facility]  [address] [place] [address]  [phone] ask for Women's Clinic    Imaging [phone]    [facility]  [address] [place] [address]  [phone]  Imaging [phone]     Urgent Care locations:    [facility] Saturday and Sunday   9 am - 5 pm    Monday-Friday   12 pm - 8 pm  Saturday and Sunday   9 am - 5 pm   [phone]       If you need a medication refill, please contact your pharmacy. Please allow 3 business days for your refill to be completed.  As always, Thank you for trusting us with your healthcare needs!                 Follow-ups after your visit        Your next 10 appointments already scheduled     Nov 13, 2017  3:45 PM CST   ESTABLISHED PRENATAL with Cephas Mawuena Agbeh, MD   [facility] [place] ([facility]    [address] [id number] [phone]            Dec 11, 2017  3:00 PM CST   ESTABLISHED PRENATAL with Cephas Mawuena Agbeh, MD   [facility] [place] ([facility]     [address]  [place] [address] [phone]              Who to contact     If you have questions or need follow up information about today's clinic visit or your schedule please contact [facility] directly at [phone].  Normal or non-critical lab and imaging results will be communicated to you by MyChart, letter or phone within 4 business days after the clinic has received the results. If you do not hear from us within 7 days, please contact the clinic through [facility] or phone. If you have a critical or abnormal lab result, we will notify you by phone as soon as possible.  Submit refill requests through [employer] or call your pharmacy and they will forward the refill request to us. Please allow 3 business days for your refill to be completed.          Additional Information About Your Visit        MyChart Information     [employer] gives you secure access to your electronic health record. If you see a primary care provider, you can also send messages to your care team and make appointments. If you have questions, please call your primary care clinic.  If you do not have a primary care provider, please call [phone] and they will assist you.        Care EveryWhere ID     This is your Care EveryWhere ID. This could be used by other organizations to access your [place] medical records  [id number]        Your Vitals Were     Pulse Temperature Last Period Pulse Oximetry BMI (Body Mass Index)       75 98.1  F (36.7  C) (Tympanic) (LMP Unknown) 98% 36.95 kg/m2        Blood Pressure from Last 3 Encounters:   11/13/17 111/73   10/19/17 112/69   09/18/17 124/66    Weight from Last 3 Encounters:   11/13/17 243 lb (110.2 kg)   10/19/17 238 lb 9.6 oz (108.2 kg)   09/18/17 239 lb 12.8 oz (108.8 kg)              We Performed the Following     Anti Treponema     Glucose tolerance gest screen 1 hour     OB hemoglobin        Primary Care Provider Office Phone # Fax #    Bridget Mawuena Agbeh, MD  [phone]       [address]  [place] [address]        Equal Access to Services     JACKI BROWN : Hadii aad ku hadlanjesus Wang, wabrittanida luqlindsey, alexandreata kalindada cristal, kevin sinha lajuaquinrena isrrael. So [facility] [phone].    ATENCIÓN: Si habla español, tiene a ledezma disposición servicios gratuitos de asistencia lingüística. Llame al [phone].    We comply with applicable federal civil rights laws and [place] laws. We do not discriminate on the basis of race, color, national origin, age, disability, sex, sexual orientation, or gender identity.            Thank you!     Thank you for choosing [facility]  for your care. Our goal is always to provide you with excellent care. Hearing back from our patients is one way we can continue to improve our services. Please take a few minutes to complete the written survey that you may receive in the mail after your visit with us. Thank you!             Your Updated Medication List - Protect others around you: Learn how to safely use, store and throw away your medicines at www.disposemymeds.org.          This list is accurate as of: 11/13/17  3:27 PM.  Always use your most recent med list.                   Brand Name Dispense Instructions for use Diagnosis    PRENATAL VITAMIN PO      Take 1 tablet by mouth daily

## 2017-11-14 LAB — T PALLIDUM IGG+IGM SER QL: NEGATIVE

## 2017-11-15 ENCOUNTER — MYC MEDICAL ADVICE (OUTPATIENT)
Dept: OBGYN | Facility: CLINIC | Age: 27
End: 2017-11-15

## 2017-11-16 ENCOUNTER — NURSE TRIAGE (OUTPATIENT)
Dept: NURSING | Facility: CLINIC | Age: 27
End: 2017-11-16

## 2017-11-16 ENCOUNTER — HOSPITAL ENCOUNTER (INPATIENT)
Facility: CLINIC | Age: 27
LOS: 32 days | Discharge: HOME HEALTH | End: 2017-12-18
Attending: OBSTETRICS & GYNECOLOGY | Admitting: OBSTETRICS & GYNECOLOGY
Payer: COMMERCIAL

## 2017-11-16 DIAGNOSIS — Z98.891 S/P CESAREAN SECTION: Primary | ICD-10-CM

## 2017-11-16 DIAGNOSIS — L02.215 PERINEAL ABSCESS: ICD-10-CM

## 2017-11-16 PROBLEM — O42.919 PRETERM PREMATURE RUPTURE OF MEMBRANES (PPROM) DELIVERED, CURRENT HOSPITALIZATION: Status: ACTIVE | Noted: 2017-11-16

## 2017-11-16 LAB
ABO + RH BLD: NORMAL
ABO + RH BLD: NORMAL
BASOPHILS # BLD AUTO: 0 10E9/L (ref 0–0.2)
BASOPHILS NFR BLD AUTO: 0.2 %
BLD GP AB SCN SERPL QL: NORMAL
BLOOD BANK CMNT PATIENT-IMP: NORMAL
DIFFERENTIAL METHOD BLD: ABNORMAL
EOSINOPHIL # BLD AUTO: 0.1 10E9/L (ref 0–0.7)
EOSINOPHIL NFR BLD AUTO: 1.1 %
ERYTHROCYTE [DISTWIDTH] IN BLOOD BY AUTOMATED COUNT: 14 % (ref 10–15)
HCT VFR BLD AUTO: 32.7 % (ref 35–47)
HGB BLD-MCNC: 10.8 G/DL (ref 11.7–15.7)
IMM GRANULOCYTES # BLD: 0 10E9/L (ref 0–0.4)
IMM GRANULOCYTES NFR BLD: 0.1 %
LYMPHOCYTES # BLD AUTO: 1.2 10E9/L (ref 0.8–5.3)
LYMPHOCYTES NFR BLD AUTO: 13.1 %
MCH RBC QN AUTO: 29.3 PG (ref 26.5–33)
MCHC RBC AUTO-ENTMCNC: 33 G/DL (ref 31.5–36.5)
MCV RBC AUTO: 89 FL (ref 78–100)
MONOCYTES # BLD AUTO: 0.3 10E9/L (ref 0–1.3)
MONOCYTES NFR BLD AUTO: 3.5 %
NEUTROPHILS # BLD AUTO: 7.2 10E9/L (ref 1.6–8.3)
NEUTROPHILS NFR BLD AUTO: 82 %
NRBC # BLD AUTO: 0 10*3/UL
NRBC BLD AUTO-RTO: 0 /100
PLATELET # BLD AUTO: 288 10E9/L (ref 150–450)
RBC # BLD AUTO: 3.69 10E12/L (ref 3.8–5.2)
SPECIMEN EXP DATE BLD: NORMAL
WBC # BLD AUTO: 8.8 10E9/L (ref 4–11)

## 2017-11-16 PROCEDURE — 86900 BLOOD TYPING SEROLOGIC ABO: CPT | Performed by: OBSTETRICS & GYNECOLOGY

## 2017-11-16 PROCEDURE — 87210 SMEAR WET MOUNT SALINE/INK: CPT | Performed by: OBSTETRICS & GYNECOLOGY

## 2017-11-16 PROCEDURE — 86901 BLOOD TYPING SEROLOGIC RH(D): CPT | Performed by: OBSTETRICS & GYNECOLOGY

## 2017-11-16 PROCEDURE — 12000028 ZZH R&B OB UMMC

## 2017-11-16 PROCEDURE — 87491 CHLMYD TRACH DNA AMP PROBE: CPT | Performed by: OBSTETRICS & GYNECOLOGY

## 2017-11-16 PROCEDURE — 87591 N.GONORRHOEAE DNA AMP PROB: CPT | Performed by: OBSTETRICS & GYNECOLOGY

## 2017-11-16 PROCEDURE — 85025 COMPLETE CBC W/AUTO DIFF WBC: CPT | Performed by: OBSTETRICS & GYNECOLOGY

## 2017-11-16 PROCEDURE — 86850 RBC ANTIBODY SCREEN: CPT | Performed by: OBSTETRICS & GYNECOLOGY

## 2017-11-16 PROCEDURE — 36415 COLL VENOUS BLD VENIPUNCTURE: CPT | Performed by: OBSTETRICS & GYNECOLOGY

## 2017-11-16 RX ORDER — NICOTINE 21 MG/24HR
1 PATCH, TRANSDERMAL 24 HOURS TRANSDERMAL DAILY
Status: DISCONTINUED | OUTPATIENT
Start: 2017-11-17 | End: 2017-11-18

## 2017-11-16 RX ORDER — AZITHROMYCIN 250 MG/1
1000 TABLET, FILM COATED ORAL ONCE
Status: COMPLETED | OUTPATIENT
Start: 2017-11-16 | End: 2017-11-17

## 2017-11-16 RX ORDER — ACETAMINOPHEN 325 MG/1
325-975 TABLET ORAL EVERY 4 HOURS PRN
Status: DISCONTINUED | OUTPATIENT
Start: 2017-11-16 | End: 2017-12-15

## 2017-11-16 RX ORDER — BETAMETHASONE SODIUM PHOSPHATE AND BETAMETHASONE ACETATE 3; 3 MG/ML; MG/ML
12 INJECTION, SUSPENSION INTRA-ARTICULAR; INTRALESIONAL; INTRAMUSCULAR; SOFT TISSUE EVERY 24 HOURS
Status: DISCONTINUED | OUTPATIENT
Start: 2017-11-16 | End: 2017-11-16

## 2017-11-16 RX ORDER — AMOXICILLIN 250 MG
1 CAPSULE ORAL 2 TIMES DAILY PRN
Status: DISCONTINUED | OUTPATIENT
Start: 2017-11-16 | End: 2017-11-18

## 2017-11-16 RX ORDER — AMOXICILLIN 250 MG
2 CAPSULE ORAL 2 TIMES DAILY PRN
Status: DISCONTINUED | OUTPATIENT
Start: 2017-11-16 | End: 2017-11-18

## 2017-11-16 RX ORDER — PRENATAL VIT/IRON FUM/FOLIC AC 27MG-0.8MG
1 TABLET ORAL DAILY
Status: DISCONTINUED | OUTPATIENT
Start: 2017-11-17 | End: 2017-12-15

## 2017-11-16 RX ORDER — AZITHROMYCIN 250 MG/1
250 TABLET, FILM COATED ORAL DAILY
Status: COMPLETED | OUTPATIENT
Start: 2017-11-17 | End: 2017-11-22

## 2017-11-16 RX ORDER — LIDOCAINE 40 MG/G
CREAM TOPICAL
Status: DISCONTINUED | OUTPATIENT
Start: 2017-11-16 | End: 2017-12-15

## 2017-11-16 RX ORDER — ONDANSETRON 2 MG/ML
4 INJECTION INTRAMUSCULAR; INTRAVENOUS EVERY 6 HOURS PRN
Status: DISCONTINUED | OUTPATIENT
Start: 2017-11-16 | End: 2017-12-15

## 2017-11-16 RX ORDER — BISACODYL 10 MG
10 SUPPOSITORY, RECTAL RECTAL DAILY PRN
Status: DISCONTINUED | OUTPATIENT
Start: 2017-11-18 | End: 2017-12-15

## 2017-11-16 RX ORDER — CLINDAMYCIN PHOSPHATE 900 MG/50ML
900 INJECTION, SOLUTION INTRAVENOUS EVERY 8 HOURS
Status: COMPLETED | OUTPATIENT
Start: 2017-11-16 | End: 2017-11-18

## 2017-11-16 RX ORDER — BETAMETHASONE SODIUM PHOSPHATE AND BETAMETHASONE ACETATE 3; 3 MG/ML; MG/ML
12 INJECTION, SUSPENSION INTRA-ARTICULAR; INTRALESIONAL; INTRAMUSCULAR; SOFT TISSUE ONCE
Status: COMPLETED | OUTPATIENT
Start: 2017-11-17 | End: 2017-11-17

## 2017-11-16 NOTE — IP AVS SNAPSHOT
MRN:[id number]                      After Visit Summary   11/16/2017    Naheed Crouch    MRN: [id number]           Thank you!     Thank you for choosing [facility] for your care. Our goal is always to provide you with excellent care. Hearing back from our patients is one way we can continue to improve our services. Please take a few minutes to complete the written survey that you may receive in the mail after you visit with us. Thank you!        Patient Information     Date Of Birth          1990        Designated Caregiver       Most Recent Value    Caregiver    Will someone help with your care after discharge? no      About your hospital stay     You were admitted on:  November 16, 2017 You last received care in the:  [facility]    You were discharged on:  December 18, 2017        Reason for your hospital stay       Maternity care                  Who to Call     For medical emergencies, please call 911.  For non-urgent questions about your medical care, please call your primary care provider or clinic, [phone]  For questions related to your surgery, please call your surgery clinic        Attending Provider     Provider Specialty    Larissa Greer MD OB/Gyn    Jason, Rachel Ventura MD Obstetrics & Gynecology, Maternal & Fetal Medicine    [facility], Charles DE LA TORRE MD Obstetrics & Gynecology, Maternal & Fetal Medicine    Velasquez, Gonzalo Cleveland MD OB/Gyn    Manny, Aaliyah Herrera MD OB/Gyn    Abhijeet, Larissa Noonan MD Obstetrics & Gynecology, Maternal & Fetal Medicine    [place], Larissa Morales MD OB/Gyn       Primary Care Provider Office Phone # Fax #    Bridget Mawuena Agbeh, [phone]      After Care Instructions     Activity       Review discharge instructions            Diet       Resume previous diet            Discharge Instructions - Hypertensive disorders patients       High Blood pressure patients to follow up in clinic or via home care within one week for a blood  pressure check            Discharge Instructions - Postpartum visit       Schedule postpartum visit with your provider and return to clinic in 6 weeks.                  Further instructions from your care team       Postop  Birth Instructions    Activity       Do not lift more than 10 pounds for 6 weeks after surgery.  Ask family and friends for help when you need it.    No driving until you have stopped taking your pain medications (usually two weeks after surgery).    No heavy exercise or activity for 6 weeks.  Don't do anything that will put a strain on your surgery site.    Don't strain when using the toilet.  Your care team may prescribe a stool softener if you have problems with your bowel movements.     To care for your incision:       Keep the incision clean and dry.    Do not soak your incision in water. No swimming or hot tubs until it has fully healed. You may soak in the bathtub if the water level is below your incision.    Do not use peroxide, gel, cream, lotion, or ointment on your incision.    Adjust your clothes to avoid pressure on your surgery site (check the elastic in your underwear for example).     You may see a small amount of clear or pink drainage and this is normal.  Check with your health care provider:       If the drainage increases or has an odor.    If the incision reddens, you have swelling, or develop a rash.    If you have increased pain and the medicine we prescribed doesn't help.    If you have a fever above 100.4 F (38 C) with or without chills when placing thermometer under your tongue.   The area around your incision (surgery wound), will feel numb.  This is normal. The numbness should go away in less than a year.     Keep your hands clean:  Always wash your hands before touching your incision (surgery wound). This helps reduce your risk of infection. If your hands aren't dirty, you may use an alcohol hand-rub to clean your hands. Keep your nails clean and  "short.    Call your healthcare provider if you have any of these symptoms:       You soak a sanitary pad with blood within 1 hour, or you see blood clots larger than a golf ball.    Bleeding that lasts more than 6 weeks.    Vaginal discharge that smells bad.    Severe pain, cramping or tenderness in your lower belly area.    A need to urinate more frequently (use the toilet more often), more urgently (use the toilet very quickly), or it burns when you urinate.    Nausea and vomiting.    Redness, swelling or pain around a vein in your leg.    Problems breastfeeding or a red or painful area on your breast.    Chest pain and cough or are gasping for air.    Problems with coping with sadness, anxiety or depression. If you have concerns about hurting yourself or the baby, call your provider immediately.      You have questions or concerns after you return home.                  Pending Results     Date and Time Order Name Status Description    12/15/2017 1725 Placenta path order and indications In process     12/15/2017 1724 Surgical Path Exam In process             Statement of Approval     Ordered          12/18/17 0853  I have reviewed and agree with all the recommendations and orders detailed in this document.  EFFECTIVE NOW     Approved and electronically signed by:  Celena Lopez MD             Admission Information     Date & Time Provider Department Dept. Phone    11/16/2017 Larissa Doyle MD [facility] [phone]      Your Vitals Were     Blood Pressure Pulse Temperature Respirations Height Weight    110/67 90 97.7  F (36.5  C) (Oral) 17 1.727 m (5' 8\") 116 kg (255 lb 12.8 oz)    Last Period Pulse Oximetry BMI (Body Mass Index)             05/13/2017 96% 38.89 kg/m2         MyChart Information     [employer] gives you secure access to your electronic health record. If you see a primary care provider, you can also send messages to your care team and make appointments. If you have questions, please call " your primary care clinic.  If you do not have a primary care provider, please call [phone] and they will assist you.        Care EveryWhere ID     This is your Care EveryWhere ID. This could be used by other organizations to access your [facility] medical records  [id number]        Equal Access to Services     JACKI BROWN : Sabas clinton Soedwina, waaxda luqadaha, qaybta kaalmada goldieyabob, kevin arcos. So [facility] [phone].    ATENCIÓN: Si habla español, tiene a ledezma disposición servicios gratuitos de asistencia lingüística. Andrew al [phone].    We comply with applicable federal civil rights laws and [place] laws. We do not discriminate on the basis of race, color, national origin, age, disability, sex, sexual orientation, or gender identity.               Review of your medicines      START taking        Dose / Directions    acetaminophen 325 MG tablet   Commonly known as:  TYLENOL        Dose:  650 mg   Take 2 tablets (650 mg) by mouth every 4 hours as needed for other (surgical pain)   Quantity:  60 tablet   Refills:  3       clindamycin 300 MG capsule   Commonly known as:  CLEOCIN   Indication:  Abscess   Used for:  Perineal abscess        Dose:  300 mg   Take 1 capsule (300 mg) by mouth every 8 hours   Quantity:  4 capsule   Refills:  0       ibuprofen 400 MG tablet   Commonly known as:  ADVIL/MOTRIN        Dose:  400-800 mg   Take 1-2 tablets (400-800 mg) by mouth every 6 hours as needed for other (cramping)   Quantity:  60 tablet   Refills:  0       oxyCODONE IR 5 MG tablet   Commonly known as:  ROXICODONE        Dose:  5-10 mg   Take 1-2 tablets (5-10 mg) by mouth every 3 hours as needed for moderate to severe pain   Quantity:  25 tablet   Refills:  0       senna-docusate 8.6-50 MG per tablet   Commonly known as:  SENOKOT-S;PERICOLACE        Dose:  1-2 tablet   Take 1-2 tablets by mouth 2 times daily   Quantity:  60 tablet   Refills:  3         CONTINUE these  medicines which have NOT CHANGED        Dose / Directions    PRENATAL VITAMIN PO        Dose:  1 tablet   Take 1 tablet by mouth daily   Refills:  0            Where to get your medicines      These medications were sent to [facility] [place], [place] - [address]  [address], [facility] [address]     Phone:  [phone]     acetaminophen 325 MG tablet    clindamycin 300 MG capsule    ibuprofen 400 MG tablet    senna-docusate 8.6-50 MG per tablet         Some of these will need a paper prescription and others can be bought over the counter. Ask your nurse if you have questions.     Bring a paper prescription for each of these medications     oxyCODONE IR 5 MG tablet               ANTIBIOTIC INSTRUCTION     You've Been Prescribed an Antibiotic - Now What?  Your healthcare team thinks that you or your loved one might have an infection. Some infections can be treated with antibiotics, which are powerful, life-saving drugs. Like all medications, antibiotics have side effects and should only be used when necessary. There are some important things you should know about your antibiotic treatment.      Your healthcare team may run tests before you start taking an antibiotic.    Your team may take samples (e.g., from your blood, urine or other areas) to run tests to look for bacteria. These test can be important to determine if you need an antibiotic at all and, if you do, which antibiotic will work best.      Within a few days, your healthcare team might change or even stop your antibiotic.    Your team may start you on an antibiotic while they are working to find out what is making you sick.    Your team might change your antibiotic because test results show that a different antibiotic would be better to treat your infection.    In some cases, once your team has more information, they learn that you do not need an antibiotic at all. They may find out that you don't have an infection, or  that the antibiotic you're taking won't work against your infection. For example, an infection caused by a virus can't be treated with antibiotics. Staying on an antibiotic when you don't need it is more likely to be harmful than helpful.      You may experience side effects from your antibiotic.    Like all medications, antibiotics have side effects. Some of these can be serious.    Let you healthcare team know if you have any known allergies when you are admitted to the hospital.    One significant side effect of nearly all antibiotics is the risk of severe and sometimes deadly diarrhea caused by Clostridium difficile (C. Difficile). This occurs when a person takes antibiotics because some good germs are destroyed. Antibiotic use allows C. diificile to take over, putting patients at high risk for this serious infection.    As a patient or caregiver, it is important to understand your or your loved one's antibiotic treatment. It is especially important for caregivers to speak up when patients can't speak for themselves. Here are some important questions to ask your healthcare team.    What infection is this antibiotic treating and how do you know I have that infection?    What side effects might occur from this antibiotic?    How long will I need to take this antibiotic?    Is it safe to take this antibiotic with other medications or supplements (e.g., vitamins) that I am taking?     Are there any special directions I need to know about taking this antibiotic? For example, should I take it with food?    How will I be monitored to know whether my infection is responding to the antibiotic?    What tests may help to make sure the right antibiotic is prescribed for me?      Information provided by:  www.cdc.gov/getsmart  U.S. Department of Health and Human Services  Centers for disease Control and Prevention  National Center for Emerging and Zoonotic Infectious Diseases  Division of Healthcare Quality Promotion          Protect others around you: Learn how to safely use, store and throw away your medicines at www.disposemymeds.org.             Medication List: This is a list of all your medications and when to take them. Check marks below indicate your daily home schedule. Keep this list as a reference.      Medications           Morning Afternoon Evening Bedtime As Needed    acetaminophen 325 MG tablet   Commonly known as:  TYLENOL   Take 2 tablets (650 mg) by mouth every 4 hours as needed for other (surgical pain)   Last time this was given:  975 mg on 12/18/2017  3:44 AM                                clindamycin 300 MG capsule   Commonly known as:  CLEOCIN   Take 1 capsule (300 mg) by mouth every 8 hours   Last time this was given:  300 mg on 12/18/2017  8:45 AM                                ibuprofen 400 MG tablet   Commonly known as:  ADVIL/MOTRIN   Take 1-2 tablets (400-800 mg) by mouth every 6 hours as needed for other (cramping)   Last time this was given:  800 mg on 12/18/2017 10:59 AM                                oxyCODONE IR 5 MG tablet   Commonly known as:  ROXICODONE   Take 1-2 tablets (5-10 mg) by mouth every 3 hours as needed for moderate to severe pain   Last time this was given:  5 mg on 12/18/2017  8:46 AM                                PRENATAL VITAMIN PO   Take 1 tablet by mouth daily   Last time this was given:  1 tablet on 12/15/2017  8:46 AM                                senna-docusate 8.6-50 MG per tablet   Commonly known as:  SENOKOT-S;PERICOLACE   Take 1-2 tablets by mouth 2 times daily   Last time this was given:  2 tablets on 12/18/2017  8:45 AM

## 2017-11-16 NOTE — IP AVS SNAPSHOT
[facility]    [address] [address]    Phone:  [phone]                                       After Visit Summary   11/16/2017    Naheed Crouch    MRN: [id number]           After Visit Summary Signature Page     I have received my discharge instructions, and my questions have been answered. I have discussed any challenges I see with this plan with the nurse or doctor.    ..........................................................................................................................................  Patient/Patient Representative Signature      ..........................................................................................................................................  Patient Representative Print Name and Relationship to Patient    ..................................................               ................................................  Date                                            Time    ..........................................................................................................................................  Reviewed by Signature/Title    ...................................................              ..............................................  Date                                                            Time

## 2017-11-17 ENCOUNTER — HOSPITAL ENCOUNTER (INPATIENT)
Dept: ULTRASOUND IMAGING | Facility: CLINIC | Age: 27
End: 2017-11-17
Attending: OBSTETRICS & GYNECOLOGY
Payer: COMMERCIAL

## 2017-11-17 LAB
ALBUMIN UR-MCNC: 10 MG/DL
AMPHETAMINES UR QL SCN: NEGATIVE
APPEARANCE UR: CLEAR
BILIRUB UR QL STRIP: NEGATIVE
C TRACH DNA SPEC QL NAA+PROBE: NEGATIVE
CANNABINOIDS UR QL: NEGATIVE
COCAINE UR QL: NEGATIVE
COLOR UR AUTO: ABNORMAL
GLUCOSE UR STRIP-MCNC: 30 MG/DL
HGB UR QL STRIP: ABNORMAL
KETONES UR STRIP-MCNC: NEGATIVE MG/DL
LEUKOCYTE ESTERASE UR QL STRIP: NEGATIVE
MUCOUS THREADS #/AREA URNS LPF: PRESENT /LPF
N GONORRHOEA DNA SPEC QL NAA+PROBE: NEGATIVE
NITRATE UR QL: NEGATIVE
OPIATES UR QL SCN: NEGATIVE
PCP UR QL SCN: NEGATIVE
PH UR STRIP: 6.5 PH (ref 5–7)
RBC #/AREA URNS AUTO: <1 /HPF (ref 0–2)
SOURCE: ABNORMAL
SP GR UR STRIP: 1 (ref 1–1.03)
SPECIMEN SOURCE: NORMAL
SQUAMOUS #/AREA URNS AUTO: 1 /HPF (ref 0–1)
UROBILINOGEN UR STRIP-MCNC: NORMAL MG/DL (ref 0–2)
WBC #/AREA URNS AUTO: 3 /HPF (ref 0–2)
WET PREP SPEC: NORMAL

## 2017-11-17 PROCEDURE — 25000132 ZZH RX MED GY IP 250 OP 250 PS 637: Performed by: OBSTETRICS & GYNECOLOGY

## 2017-11-17 PROCEDURE — S0077 INJECTION, CLINDAMYCIN PHOSP: HCPCS | Performed by: OBSTETRICS & GYNECOLOGY

## 2017-11-17 PROCEDURE — 25000128 H RX IP 250 OP 636: Performed by: OBSTETRICS & GYNECOLOGY

## 2017-11-17 PROCEDURE — 25000125 ZZHC RX 250: Performed by: OBSTETRICS & GYNECOLOGY

## 2017-11-17 PROCEDURE — 80307 DRUG TEST PRSMV CHEM ANLYZR: CPT | Performed by: OBSTETRICS & GYNECOLOGY

## 2017-11-17 PROCEDURE — 76811 OB US DETAILED SNGL FETUS: CPT

## 2017-11-17 PROCEDURE — 81001 URINALYSIS AUTO W/SCOPE: CPT | Performed by: OBSTETRICS & GYNECOLOGY

## 2017-11-17 PROCEDURE — 12000028 ZZH R&B OB UMMC

## 2017-11-17 RX ORDER — FERROUS SULFATE 325(65) MG
325 TABLET ORAL DAILY
Status: DISCONTINUED | OUTPATIENT
Start: 2017-11-17 | End: 2017-11-18

## 2017-11-17 RX ADMIN — NICOTINE 1 PATCH: 14 PATCH, EXTENDED RELEASE TRANSDERMAL at 21:50

## 2017-11-17 RX ADMIN — SENNOSIDES AND DOCUSATE SODIUM 1 TABLET: 8.6; 5 TABLET ORAL at 21:50

## 2017-11-17 RX ADMIN — CLINDAMYCIN PHOSPHATE 900 MG: 18 INJECTION, SOLUTION INTRAVENOUS at 00:30

## 2017-11-17 RX ADMIN — BETAMETHASONE SODIUM PHOSPHATE AND BETAMETHASONE ACETATE 12 MG: 3; 3 INJECTION, SUSPENSION INTRA-ARTICULAR; INTRALESIONAL; INTRAMUSCULAR at 21:08

## 2017-11-17 RX ADMIN — AZITHROMYCIN 250 MG: 250 TABLET, FILM COATED ORAL at 08:03

## 2017-11-17 RX ADMIN — CLINDAMYCIN PHOSPHATE 900 MG: 18 INJECTION, SOLUTION INTRAVENOUS at 16:46

## 2017-11-17 RX ADMIN — AZITHROMYCIN 1000 MG: 250 TABLET, FILM COATED ORAL at 00:36

## 2017-11-17 RX ADMIN — PRENATAL VIT W/ FE FUMARATE-FA TAB 27-0.8 MG 1 TABLET: 27-0.8 TAB at 11:19

## 2017-11-17 RX ADMIN — CLINDAMYCIN PHOSPHATE 900 MG: 18 INJECTION, SOLUTION INTRAVENOUS at 08:04

## 2017-11-17 RX ADMIN — NICOTINE 1 PATCH: 14 PATCH, EXTENDED RELEASE TRANSDERMAL at 01:00

## 2017-11-17 NOTE — PLAN OF CARE
Problem: Patient Care Overview  Goal: Plan of Care/Patient Progress Review  Outcome: No Change  VSS. Afebrile. Pt continues to leak a moderate amount of mostly clear- slight yellow- tinged fluid. Denies a new abdominal tenderness or pain. No bleeding. No contractions on toco. FHTs AGA. Plan to continue with latency antibiotics and expectant cares. Monitor for maternal/fetal compromise.

## 2017-11-17 NOTE — TELEPHONE ENCOUNTER
"  Reason for Disposition    Leakage of fluid from vagina     \"I am 26 weeks pregnant(MARIA ALEJANDRA 2/17/18). Yesterday I noticed increase in discharge(white). Today at work from 5 to 5:30pm I felt several gushes of fluid. It was enough to fill a pad. I am home now, still leaking but lesser amount.No bleeding, no contractions. \" Baby is active. Fluid is clear. Denies other sx. Triaged and advised ER. Also paged on call  ( OB group)through page op at 7:10pm to call patient at [phone].    Additional Information    Negative: Passed out (i.e., lost consciousness, collapsed and was not responding)    Negative: Shock suspected (e.g., cold/pale/clammy skin, too weak to stand, low BP, rapid pulse)    Negative: Difficult to awaken or acting confused  (e.g., disoriented, slurred speech)    Negative: [1] SEVERE abdominal pain (e.g., excruciating) AND [2] constant AND [3] present > 1 hour    Negative: Severe bleeding (e.g., continuous red blood from vagina, or large blood clots)    Negative: Umbilical cord hanging out of the vagina (shiny, white, curled appearance, \"like telephone cord\")    Negative: Uncontrollable urge to push (i.e., feels like baby is coming out now)    Negative: Can see baby    Negative: Sounds like a life-threatening emergency to the triager    Negative: MODERATE-SEVERE abdominal pain    Negative: Contractions < 10 minutes apart for 1 hour (i.e., 6 or more contractions an hour)    Negative: [1] Contractions > 10 minutes apart AND [2] persist > 24 hours AND [3] no improvement using Care Advice    Negative: [1] Contractions AND [2] any vaginal bleeding (including: red blood, clots, spotting, or pink/brown mucous)    Negative: Vaginal bleeding    (Exception: spotting after intercourse or pelvic exam, or slight pinkish or brownish mucous discharge)    Negative: [1] Baby moving less today (e.g., kick count < 5 in 1 hour or < 10 in 2 hours) AND [2] pregnant 23 or more weeks    Protocols used: PREGNANCY - " LABOR - -ADULT-AH

## 2017-11-17 NOTE — H&P
[facility]  OB History and Physical      Naheed Crouch MRN# [id number]   Age: 27 year old YOB: 1990     CC:  Rupture of membranes    HPI:  Ms. Naheed Crouch is a 27 year old  at 26w5d by LMP c/w 7w5d US, who presents as transfer of care from [place] due to PPROM.  She notes around 5 pm today, she was at work and noted leakage of fluid that was enough to fill a pad.  She stayed a work for about another hour and called the RN line when she arrived home.  At [place], amnisure was positive.  She received a dose of betamethasone before transfer at  as well as 2 grams of Ancef.  She denies contractions, cramping, vaginal bleeding.  Notes small amount of ongoing leakage of clear fluid.   Notes normal fetal movement.  Denies abdominal pain, fevers, chills.     Pregnancy Complications:  - PPROM at 26w5d   - Tobacco use, 1 ppd smoker   - Obesity, BMI 36   - Desires permanent sterilization   - History of CIN3 s/p LEEP - last pap NIL, HPV neg (2017)    Prenatal Labs:   Lab Results   Component Value Date    ABO A 2017    RH  Pos 2017    AS Neg 2017    HEPBANG Nonreactive 2017    CHPCRT  2017     Negative   Negative for C. trachomatis rRNA by transcription mediated amplification.   A negative result by transcription mediated amplification does not preclude the   presence of C. trachomatis infection because results are dependent on proper   and adequate collection, absence of inhibitors, and sufficient rRNA to be   detected.      GCPCRT  2017     Negative   Negative for N. gonorrhoeae rRNA by transcription mediated amplification.   A negative result by transcription mediated amplification does not preclude the   presence of N. gonorrhoeae infection because results are dependent on proper   and adequate collection, absence of inhibitors, and sufficient rRNA to be   detected.      TREPAB Negative 2017    HGB 10.9 (L)  2017       GBS Status:   Lab Results   Component Value Date    GBS  2012     Positive: GBS DNA detected, presumed positive for GBS.   Assay performed on incubated broth culture of specimen using Cepheid   SmartCycler(R) real-time PCR.     US:   - 17: breech, EFW 60%ile, 323 grams, normal anatomy but limited heart views   - 17: breech, 3.5 cm cervical length, normal RV outflow tract, limited LV outflow tract    OB History  Obstetric History       T1      L1     SAB1   TAB0   Ectopic0   Multiple0   Live Births1       # Outcome Date GA Lbr Mario/2nd Weight Sex Delivery Anes PTL Lv   3 Current            2 SAB 13           1 Term 12 37w1d 09:00 / 00:45 2.637 kg (5 lb 13 oz) M  EPI  ISAURA      Name: Xu      Apgar1:  9                Apgar5: 9          PMHx:   Past Medical History:   Diagnosis Date     ASCUS with positive high risk HPV 2012    types 16, 53 & 59     H/O colposcopy with cervical biopsy 2013    PAUL 2 & 3     Tobacco use disorder      PSHx:   Past Surgical History:   Procedure Laterality Date     LEEP TX, CERVICAL  2013    PAUL 3     Meds:   Prescriptions Prior to Admission   Medication Sig Dispense Refill Last Dose     Prenatal Vit-Fe Fumarate-FA (PRENATAL VITAMIN PO) Take 1 tablet by mouth daily   Taking     Allergies:    Allergies   Allergen Reactions     Amoxicillin Nausea and Vomiting      FmHx:   Family History   Problem Relation Age of Onset     Hypertension Father      HEART DISEASE Maternal Grandmother      HEART DISEASE Maternal Grandfather      Breast Cancer Paternal Grandmother      SocHx: She denies alcohol, or other drug use during this pregnancy. Smokes 1 ppd.    ROS:   Complete 10-point ROS negative except as noted in HPI.She denies headache, blurry vision, chest pain, shortness of breath, RUQ pain, nausea, vomiting, dysuria, hematuria or extremity edema.    PE:  Vit: Patient Vitals for the past 4 hrs:   BP Temp Temp src   17  2232 129/65 97.8  F (36.6  C) Oral      Gen: Well-appearing, NAD, comfortable   CV: rrr, no mrg   Pulm: Ctab, no wheezes or crackles   Abd: Soft, gravid, non-tender  Ext: no LE edema b/l  SSE:  Normal EGBUS, pink vaginal mucosa, small amount of clear fluid pooling in the tip of the speculum, cervix multiparous but visually closed    Pres:  Transverse by BSUS    FHT: Baseline 140s, moderate variability, + accelerations, rare variable decelerations   [place]: 0 contractions in 10 minutes      Assessment/Plan  Naheed Crouch is a 27 year old , at 26w5d by LMP c/w 7w5d US, who presents with PPROM.  Pregnancy is complicated by history of LEEP, tobacco use, obesity.    1. PPROM:   - Admit to antepartum for PPROM.  No signs of labor, infection or fetal distress at this time.  Discussed moving toward delivery for this indications and otherwise expected management until 34 weeks.  Cervix appears closed with no contractions noted on tocometer.   - Plan for 7 days of latency antibiotics with azithromycin and clindamycin given allergies.    - S/p BMZ x1 at OSH at , will repeat dose tomorrow.   - NICU consult for possible  delivery.   - Will need IV magnesium for neuroprotection if delivery is imminent.   - PPROM work-up collected with UA, UDS, wet prep, Gc/Chlam.  GBS pending at OSH.   - Serial vital signs and temperature assessments.   - Regular diet given that patient is stable.  SCDs in bed.      2. MOD:   - Infant is currently transverse presentation.  Would need C/S at this time for delivery. The risks, benefits, and alternatives of  section were discussed, including the risks of bleeding, infection, injury to surrounding organs, fetal injury, and remote risks of hysterectomy. She consented to a blood transfusion in the event of a life threatening amount of bleeding. She had time to ask questions and agreed to proceed. Surgical consent was signed.  - Consent was also signed for BTL if   section was required, which she is 100% sure she desires.  Counseled regarding permanency, failure rate, risk of ectopic with failure and risk of regret.    - Has private insurance, should not need tubal papers.     3. FWB:   - FHR reassuring for gestational age.  Continuous EFM and toco on admission, will transition to TID monitoring.   - NICU consult.    - Plan for BMZ course, IV mag for NP if delivery is imminent.   - Last growth (9/28/17): EFW 60%ile, 323 grams. Comprehensive ultrasound in AM ordered to evaluate fetal growth, MAUREEN.     4. PNC:   - Rh positive, Rubella immune, HIV NR, HepB sAg NR, GBS pending, GCT 90, Placenta anterior fundal   - Daily PNV and iron ordered, was recently instructed to start taking it. Consider iron studies as these have not been performed.   - PRN nicoderm for tobacco use.   - SW consult for long term antepartum admission.   - Needs Tdap. S/p flu vaccine.     The patient was discussed with Dr. Gomez who is in agreement with the treatment plan.    Becky Morrison MD  Ob/Gyn, PGY-3  11/16/2017, 11:05 PM

## 2017-11-17 NOTE — PLAN OF CARE
Pt continues to leak clear fluid. She denies vaginal bleeding or contractions. Is on latency antibiotics. IV saline locked in between doses. Pt has nicotine patch in place and that is working well for her so far. NICU and social work consults are pending. Will continue to monitor.

## 2017-11-17 NOTE — PROGRESS NOTES
"Maternal-Fetal Medicine BRIEF Progress Note    Naheed Crouch MRN# [id number]   Age: 27 year old  Gestational age: 26w6d YOB: 1990       Date of Admission: 2017          Subjective:        Reports leaking of clear fluid this am. No contractions.           Objective:        Patient Vitals for the past 24 hrs:   BP Temp Temp src Resp Height Weight   17 1320 109/55 98.2  F (36.8  C) Oral 16 - -   17 0810 105/57 97.7  F (36.5  C) Oral 16 - -   17 0405 107/58 98.2  F (36.8  C) - 16 - -   17 0045 - 98  F (36.7  C) - 16 1.727 m (5' 8\") 107.5 kg (237 lb)   17 0040 102/56 - - - - -   17 0030 101/56 - - - - -   17 2232 129/65 97.8  F (36.6  C) Oral - - -          Abdomen: Gravid, Soft, Non-tender            Fetal Heart Rate Tracing: category 1            Tocometer: rare contractions    Please see full imaging report from [facility] program under imaging tab.    US normal MAUREEN 12.9 cm, breech, fundal placenta, EFW 897g 38%ile.            Assessment:        27 year old y.o.  at 26w6d with  PPROM.   THERESE  Breech presentation            Plan:        Expectant management for latency antibiotics and betamethasone  Desires tubal ligation if has C/S - currently breech, discussed my concern about tubal if very  delivery (has only one other child) and possibility of regret. She will think about this. Has private insurance but still needs to sign consent with tubal - can always decline at time of C/s.           Attestation:          I spent 15 minutes in face to face inpatient counseling and evaluation today, with > 50% of this time spent in counseling re management of PPROM and breech presentation.  Rachel Gomez MD  Maternal-Fetal Medicine    2017     "

## 2017-11-17 NOTE — PROGRESS NOTES
"[facility] [place]  MATERNAL CHILD HEALTH   SOCIAL WORK PROGRESS NOTE      DATA:     Received order to complete antepartum assessment. Naheed is a 27 year old  who is 26+6 weeks pregnant with a baby boy. Significant other/FOB, Salvador Cee was bedside and supportive. Parents also have a 5 year old boy named Xu. Xu is currently with Naheed's parents while they are at the hospital. Parents currently reside in [place], [place]. 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 She expressed financial concerns related to her unexpected hospitalization as she has 1 week of vacation and does not have STD or LTD. Her partner, is currently received unemployment as he is a seasonal worker at [employer]. However, plans to obtain employment due to their current circumstances. Naheed has BCBS for insurance.     Naheed reported feeling \"great\" during this pregnancy and denied any concerns with her mood. She reported no mental health history. She did report feeling emotional and crying at times during the pregnancy. However, reported no concerns related to this. Parents are understanding of mom's admission. Parents reported no additional questions or concerns at this time. Parents report having great support from family and friends.     INTERVENTION:     This  reviewed the chart and coordinated with the health care team. This  introduced myself and my role as their Maternal-Child Health , including role and scope of practice. I met with the patient and her partner today to assess for needs, offer support, assess for coping and review hospital and community resources. Validated parents worries related to their financial concerns and offered to connect with parents again on Monday to discuss resources available. Accessed  resources and provided parents with a one month parking pass. Provided " emotional support and active listening. This writer provided parents with their contact information and encouraged them to utilize them.     ASSESSMENT:     Parents easily engaged in conversation. They seemed appropriately worried about mom's hospitalization and the financial stressors associated. They seemed appreciative of parking pass provided. Affect was appropriate. Parents able to verbally express themselves and are aware of social work availability.     PLAN:     This writer will continue to follow and provide support and resources. This writer will check in with family on Monday to review community resources.     HARRISON Tang, [facility]   Social Worker  Maternal Child Health   Phone: [phone]  Pager: [phone]

## 2017-11-17 NOTE — PLAN OF CARE
Patient arrived via ambulance from [place].  VSS, afebrile. Denies cramping, backache, pelvic pressure.  Dr. Morrison at bedside.  EFM applied.   Salvador on way from home.    Report given to Manuela HO RN for nights.

## 2017-11-17 NOTE — DISCHARGE SUMMARY
[facility] DISCHARGE SUMMARY  [facility]  Naheed Crouch  [id number]    Date of Admission: 2017  Provider for admission: Rachel Gomez MD [facility] staff   Date of Discharge: 2017  Provider for discharge: Lottie Tellez MD      Admission Diagnoses:   - IUP at 26w5d   - PPROM   - Obesity, BMI 36  - Tobacco use   - History of LEEP   - Desires permanent sterilization     Discharge Diagnoses:   - Same s/p PHTCS and BS  - Intermittent absent end diastolic flow in umbilical artery dopplers  - Intrauterine growth restriction  - Cessation of tobacco use  - Marginal placental abruption  - Gestational HTN  - perineal abscess    Procedures:   - Course of betamethasone   - Latency antibiotics  - Serial MAUREEN and growth ultrasounds  - STAT Primary high transverse  section under GETA  - Bilateral salpingectomy  - Incision and drainage of perineal abscess    Consults:   - NICU  - Social work   - Physical therapy     Admission History: Naheed Crouch is a 27 year old  at 26w5d  LMP c/w 7w5d US, who presents as transfer of care from [place] due to PPROM. At 5 pm on the day of admission she was at work and noted leakage of fluid that was enough to fill a pad.  She stayed a work for about another hour and called the RN line when she arrived home. At [place], Amnisure was positive.  She received a dose of betamethasone before transfer at  as well as 2 grams of Ancef.  She denies contractions, cramping, vaginal bleeding.  Notes small amount of ongoing leakage of clear fluid. Notes normal fetal movement.  Denies abdominal pain, fevers, chills.  Her pregnancy was complicated by obesity, tobacco use, history of LEEP. See admission history and physical for complete details.     Hospital Course:   On admission, the patient's cervix was noted to be closed with no contractions noted.  She was started on latency antibiotics (azithromycin and clindamycin given her amoxicillin allergy), which  she received for 7 days.  She completed a course of betamethasone and was seen by the NICU for consultation.  On HD#2, she underwent comprehensive ultrasound, which showed 38% (897 grams). This was followed by serial twice weekly BPPs. Ultrasound performed on 12/ demonstrated new diagnosis of subchorionic hemorrhage and fetal growth restriction based on AC <3%ile but overall EFW 20%ile. UA dopplers were followed and elevated on 12/.  She then received a rescue dose of betamethasone. She was diagnosed with gestational hypertension and asymmetric IUGR. She was expectantly managed until 12/15/17 at 30w6d when she developed fetal bradycardia. A code  section was called. En route to the , the patient stated clearly to multiple people she desired the tubal ligation. The risks, benefits, and alternatives of  section were discussed with the patient, and she agreed to proceed.     She also had a perineal abscess that was diagnosed on  and she underwent incision and drainage.  She was started on Clindamycin for a total of 7 days.     Operative Course:  Patient underwent GETA for STAT primary high transverse  section on 12/15/17. Her operative course was uncomplicated.     Operative findings included:   1. No fascial or uterine incisions  2. No amniotic fluid  3. Liveborn male infant in breech presentation. Apgars 5 at 1 minute & 9 at 5 minutes. Weight pending.  4. Arterial pH 7.08, arterial BD 11; Venous pH 7.32, BD 3.6  5. Normal uterus, fallopian tubes, and ovaries.   6. Excellent hemostasis bilaterally s/p bilateral salpingectomy    EBL from the delivery was 1100mL. Please see her  Section Operative Note for full details regarding her delivery.    Post-operative course:   Her postoperative course was uncomplicated. On POD#3, she was meeting all of her postpartum goals and deemed stable for discharge. She was voiding without difficulty, tolerating a regular diet without nausea  and vomiting, her pain was well controlled on oral pain medicines and her lochia was appropriate. Her hemoglobin prior to delivery was 11.5 and after delivery was 10.6. Her Rh status was positive and Rhogam was not indicated.      She was continued on Clindamycin for a total of 7 days for perineal abscess s/p I&D antenatally.  She was discharged home on this medication to complete an additional day for a total of 7 days.     Discharge Medications:  Current Discharge Medication List      START taking these medications    Details   clindamycin (CLEOCIN) 300 MG capsule Take 1 capsule (300 mg) by mouth every 8 hours  Qty: 4 capsule, Refills: 0    Associated Diagnoses: Perineal abscess      oxyCODONE IR (ROXICODONE) 5 MG tablet Take 1-2 tablets (5-10 mg) by mouth every 3 hours as needed for moderate to severe pain  Qty: 25 tablet, Refills: 0    Associated Diagnoses: S/P  section      acetaminophen (TYLENOL) 325 MG tablet Take 2 tablets (650 mg) by mouth every 4 hours as needed for other (surgical pain)  Qty: 60 tablet, Refills: 3    Associated Diagnoses: S/P  section      ibuprofen (ADVIL/MOTRIN) 400 MG tablet Take 1-2 tablets (400-800 mg) by mouth every 6 hours as needed for other (cramping)  Qty: 60 tablet, Refills: 0    Associated Diagnoses: S/P  section      senna-docusate (SENOKOT-S;PERICOLACE) 8.6-50 MG per tablet Take 1-2 tablets by mouth 2 times daily  Qty: 60 tablet, Refills: 3    Associated Diagnoses: S/P  section         CONTINUE these medications which have NOT CHANGED    Details   Prenatal Vit-Fe Fumarate-FA (PRENATAL VITAMIN PO) Take 1 tablet by mouth daily               Discharge/Disposition:  Naheed Crouch was discharged to home in stable condition with the following instructions/medications:  1) Call for temperature > 100.4, bright red vaginal bleeding >1 pad an hour x 2 hours, foul smelling vaginal discharge, pain not controlled by usual oral pain meds, persistent  nausea and vomiting not controlled on medications, drainage or redness from incision site  2) She was s/p tubal for contraception.  3) For feeding she decided to breast feed.  4) Discharge activity:  No heavy lifting >15 lbs or strenuous activity for 6 weeks, pelvic rest for 6 weeks, no driving or operating machinery while on narcotics.    Discharge Follow-up:  - Follow up with primary OB/GYN in 1 week for blood pressure check and follow-up of perineal abscess  - Follow up with primary OB/GYN in 6 weeks for routine postpartum check    Criselda Azul MD  OB GYN PGY-3  12/18/2017  6:30 AM    Appreciate note by Dr. Azul. Patient has been seen and examined by me separate from the resident, agree with above note.     Celena Lopez MD  8:53 AM

## 2017-11-18 PROCEDURE — 25000128 H RX IP 250 OP 636: Performed by: OBSTETRICS & GYNECOLOGY

## 2017-11-18 PROCEDURE — S0077 INJECTION, CLINDAMYCIN PHOSP: HCPCS | Performed by: OBSTETRICS & GYNECOLOGY

## 2017-11-18 PROCEDURE — 25000132 ZZH RX MED GY IP 250 OP 250 PS 637: Performed by: OBSTETRICS & GYNECOLOGY

## 2017-11-18 PROCEDURE — 90715 TDAP VACCINE 7 YRS/> IM: CPT | Performed by: OBSTETRICS & GYNECOLOGY

## 2017-11-18 PROCEDURE — 25000125 ZZHC RX 250: Performed by: OBSTETRICS & GYNECOLOGY

## 2017-11-18 PROCEDURE — 12000028 ZZH R&B OB UMMC

## 2017-11-18 RX ORDER — DOCUSATE SODIUM 100 MG/1
100 CAPSULE, LIQUID FILLED ORAL 2 TIMES DAILY PRN
Status: DISCONTINUED | OUTPATIENT
Start: 2017-11-18 | End: 2017-11-23

## 2017-11-18 RX ORDER — NICOTINE 21 MG/24HR
1 PATCH, TRANSDERMAL 24 HOURS TRANSDERMAL DAILY
Status: DISCONTINUED | OUTPATIENT
Start: 2017-11-18 | End: 2017-11-27

## 2017-11-18 RX ADMIN — DOCUSATE SODIUM 100 MG: 100 CAPSULE, LIQUID FILLED ORAL at 15:05

## 2017-11-18 RX ADMIN — AZITHROMYCIN 250 MG: 250 TABLET, FILM COATED ORAL at 07:56

## 2017-11-18 RX ADMIN — CLINDAMYCIN PHOSPHATE 900 MG: 18 INJECTION, SOLUTION INTRAVENOUS at 08:22

## 2017-11-18 RX ADMIN — NICOTINE 1 PATCH: 14 PATCH, EXTENDED RELEASE TRANSDERMAL at 21:58

## 2017-11-18 RX ADMIN — PRENATAL VIT W/ FE FUMARATE-FA TAB 27-0.8 MG 1 TABLET: 27-0.8 TAB at 07:56

## 2017-11-18 RX ADMIN — DOCUSATE SODIUM 100 MG: 100 CAPSULE, LIQUID FILLED ORAL at 21:15

## 2017-11-18 RX ADMIN — CLINDAMYCIN PHOSPHATE 900 MG: 18 INJECTION, SOLUTION INTRAVENOUS at 00:03

## 2017-11-18 RX ADMIN — CLINDAMYCIN HYDROCHLORIDE 450 MG: 300 CAPSULE ORAL at 21:58

## 2017-11-18 RX ADMIN — CLOSTRIDIUM TETANI TOXOID ANTIGEN (FORMALDEHYDE INACTIVATED), CORYNEBACTERIUM DIPHTHERIAE TOXOID ANTIGEN (FORMALDEHYDE INACTIVATED), BORDETELLA PERTUSSIS TOXOID ANTIGEN (GLUTARALDEHYDE INACTIVATED), BORDETELLA PERTUSSIS FILAMENTOUS HEMAGGLUTININ ANTIGEN (FORMALDEHYDE INACTIVATED), BORDETELLA PERTUSSIS PERTACTIN ANTIGEN, AND BORDETELLA PERTUSSIS FIMBRIAE 2/3 ANTIGEN 0.5 ML: 5; 2; 2.5; 5; 3; 5 INJECTION, SUSPENSION INTRAMUSCULAR at 08:18

## 2017-11-18 RX ADMIN — CLINDAMYCIN PHOSPHATE 900 MG: 18 INJECTION, SOLUTION INTRAVENOUS at 16:47

## 2017-11-18 NOTE — PLAN OF CARE
Problem: PROM, PPROM, Prolonged Rupture of Membranes (Adult,Obstetrics,Pediatric)  Goal: Signs and Symptoms of Listed Potential Problems Will be Absent, Minimized or Managed (PROM, PPROM, Prolonged Rupture of Membranes)  Signs and symptoms of listed potential problems will be absent, minimized or managed by discharge/transition of care (reference PROM, PPROM, Prolonged Rupture of Membranes (Adult,Obstetrics,Pediatric) CPG).   Outcome: No Change  Afebrile, VSS. Pt denies bldg, ctx, pain. Continues with clear LOF, small to moderate amount. FHR AGA w/mod variability, +acels, intermitt VDs. Took shower and changed bedding overnight d/t wanting to feel clean. Continue prophylactic abx and inpt management. Contact MD with questions/concerns.

## 2017-11-18 NOTE — PROVIDER NOTIFICATION
11/18/17 0946   Provider Notification   Provider Name/Title Dr. Gomez   Method of Notification At Bedside   Request Evaluate in Person   Notification Reason Other (Comment)  (rounding)   Plan is to have pt have an US on Tuesday and MAUREEN on Monday. NICU consult and a stool softener will be ordered. Reviewed ed on not using patch if going out to smoke. Plan to monitor while pt is side-lying to see if there is any impact on baby.

## 2017-11-18 NOTE — PLAN OF CARE
VSS. Pt continues to leak clear fluid. She denies vaginal bleeding or contractions.  Second betamethasone dose given. Nicotine patch replaced . Family visited and was supportive. Continue to monitor.

## 2017-11-18 NOTE — PROGRESS NOTES
Maternal-Fetal Medicine BRIEF Progress Note    Naheed Crouch MRN# [id number]   Age: 27 year old  Gestational age: 27w0d YOB: 1990       Date of Admission: 2017          Subjective:        Complains of increased leaking of clear-yellow fluid today which is making her very nervous, had large gush this am. No abdominal pain or contractions.           Objective:        Patient Vitals for the past 24 hrs:   BP Temp Temp src Resp   17 1326 127/56 98.2  F (36.8  C) - -   17 0808 109/59 98  F (36.7  C) - -   17 0410 - 97.8  F (36.6  C) Oral -   17 2357 104/53 98.3  F (36.8  C) Oral 16   17 2045 107/57 98.3  F (36.8  C) Oral 16   17 1640 117/69 98.1  F (36.7  C) Oral 18          Abdomen: Gravid, Soft, Non-tender            Fetal Heart Rate Tracing: category 1            Tocometer: no contractions         Assessment:        27 year old y.o.  at 27w0d with  PPROM. No evidence of labor or infection.             Plan:        Continue latency antibiotics       Finished betamethasone      Given patient anxiety about large gush of fluid will start twice weekly US Monday instead of Tuesday (will have Mon/Thursday).        On nicotine patch - she and her  state she has not smoked. Will continue on patch - is very interested in attempting to quit during her antepartum stay. Encouraged and congratulated on decision.       Expectant management until 34 weeks.           Attestation:            Rachel Gomez MD  Maternal-Fetal Medicine    2017

## 2017-11-18 NOTE — PLAN OF CARE
Problem: Patient Care Overview  Goal: Plan of Care/Patient Progress Review  Outcome: No Change  VSS/Afebrile. Continuation of prophylactic abx (see MAR). Pt states she had a large LOF overnight/in the AM x2, clear. Reports active fetal movement, no ctx.  with mod variability (see flowsheet) Denies any signs and symptoms of PTL, pain or change in condition. Support person at the bedside. Patient educated to report any changes in symptoms and when to call out.

## 2017-11-19 PROCEDURE — 25000132 ZZH RX MED GY IP 250 OP 250 PS 637: Performed by: OBSTETRICS & GYNECOLOGY

## 2017-11-19 PROCEDURE — 12000028 ZZH R&B OB UMMC

## 2017-11-19 PROCEDURE — 99232 SBSQ HOSP IP/OBS MODERATE 35: CPT | Performed by: NURSE PRACTITIONER

## 2017-11-19 RX ORDER — LACTOBACILLUS RHAMNOSUS GG 10B CELL
1 CAPSULE ORAL DAILY
Status: DISCONTINUED | OUTPATIENT
Start: 2017-11-19 | End: 2017-12-15

## 2017-11-19 RX ADMIN — CLINDAMYCIN HYDROCHLORIDE 450 MG: 300 CAPSULE ORAL at 14:31

## 2017-11-19 RX ADMIN — CLINDAMYCIN HYDROCHLORIDE 450 MG: 300 CAPSULE ORAL at 08:10

## 2017-11-19 RX ADMIN — PRENATAL VIT W/ FE FUMARATE-FA TAB 27-0.8 MG 1 TABLET: 27-0.8 TAB at 08:09

## 2017-11-19 RX ADMIN — CLINDAMYCIN HYDROCHLORIDE 450 MG: 300 CAPSULE ORAL at 20:04

## 2017-11-19 RX ADMIN — AZITHROMYCIN 250 MG: 250 TABLET, FILM COATED ORAL at 08:10

## 2017-11-19 RX ADMIN — DOCUSATE SODIUM 100 MG: 100 CAPSULE, LIQUID FILLED ORAL at 10:43

## 2017-11-19 RX ADMIN — NICOTINE 1 PATCH: 14 PATCH, EXTENDED RELEASE TRANSDERMAL at 22:11

## 2017-11-19 RX ADMIN — DOCUSATE SODIUM 100 MG: 100 CAPSULE, LIQUID FILLED ORAL at 20:05

## 2017-11-19 RX ADMIN — Medication 1 CAPSULE: at 10:43

## 2017-11-19 NOTE — PLAN OF CARE
Problem: PROM, PPROM, Prolonged Rupture of Membranes (Adult,Obstetrics,Pediatric)  Goal: Signs and Symptoms of Listed Potential Problems Will be Absent, Minimized or Managed (PROM, PPROM, Prolonged Rupture of Membranes)  Signs and symptoms of listed potential problems will be absent, minimized or managed by discharge/transition of care (reference PROM, PPROM, Prolonged Rupture of Membranes (Adult,Obstetrics,Pediatric) CPG).   Outcome: No Change  Afebrile, VSS. Pt denies bldg, pain, ctx. Continues to leak scant, clear fluid. No s/s of infection present. FHR AGA w/mod variability, +acels, no decels. Slept well overnight, no complaints. Continue plan of care, contact MD with questions/concerns.

## 2017-11-19 NOTE — PROGRESS NOTES
Maternal-Fetal Medicine BRIEF Progress Note    Naheed Crouch MRN# [id number]   Age: 27 year old  Gestational age: 27w1d YOB: 1990       Date of Admission: 2017          Subjective:        Still leaking fluid overnight - clear yellow, less than yesterday. No cramping or abdominal pain. Normal fetal movement. Did not smoke yesterday, tolerating patch well, Asking to attempt patch wean to tobacco abstinence as goal during hospitalization. Also requesting probiotic as always gets yeast vaginitis with oral antibiotics.           Objective:        Patient Vitals for the past 24 hrs:   BP Temp Temp src Resp   17 0815 108/67 97.1  F (36.2  C) Oral -   17 0410 - 98  F (36.7  C) Oral -   17 2329 111/54 98.1  F (36.7  C) Oral 18   17 2100 - 98  F (36.7  C) Oral -   17 1600 - 98.1  F (36.7  C) Oral 18   17 1326 127/56 98.2  F (36.8  C) - -          Abdomen: Gravid, Soft, Non-tender            Fetal Heart Rate Tracing: category 1            Tocometer: none         Assessment:        27 year old y.o.  at 27w1d with PPROM. THERESE. Breech presentation. Attempting tobacco cessation with nicotine patch.             Plan:        PPROM - day 3/7 latency antibiotics ,complete first course betamethasone   - breech - consented for C/S  - US - every Monday/Thursday for fluid and position; growth every 3 weeks  - ordered daily probiotic   - delivery at 34 weeks, sooner if labor/infection/fetal distress        Tobacco use   - will wean patch down every week as tolerated, may need to slow to every two weeks depending on symptoms. Discussed importance of abstinence when home with  - no smoking in the house or care, change clothes before caring for baby if smoking outside, and risk of SIDS        General OB issues  - GBS pending at [place]  - GTT  90  - C/S only if breech, prior SVDs at term                    Rachel Gomez MD  Maternal-Fetal Medicine      2017

## 2017-11-19 NOTE — PLAN OF CARE
Problem: Patient Care Overview  Goal: Plan of Care/Patient Progress Review  Outcome: Improving  VSS/Afebrile. Pt states LOF is scant, denies ctx, bleeding, or cramping. Pt started on a probiotic. FHT 140s (see flowsheet). Pt educated to report any changes of symptoms and when to call out.

## 2017-11-19 NOTE — PLAN OF CARE
VSS. Pt reports scant gushes of clear amniotic fluid intermittently. Pt denies pain, contractions, perineal pressure, bleeding.  at bedside and supportive. Continue to monitor.

## 2017-11-19 NOTE — PROVIDER NOTIFICATION
11/19/17 0912   Provider Notification   Provider Name/Title Dr. Gomez   Method of Notification At Bedside   Request Evaluate in Person   Recheck MAUREEN. Reviewed ed on going down on the Nicotine patch in 1 week and slowly weaning. Acidophilus probiotics ordered. Reviewed ed on SIDS and how to prevent SIDS with smokers. Questions and concerns answered. Proceed with plan of care.

## 2017-11-20 ENCOUNTER — HOSPITAL ENCOUNTER (INPATIENT)
Dept: ULTRASOUND IMAGING | Facility: CLINIC | Age: 27
End: 2017-11-20
Attending: OBSTETRICS & GYNECOLOGY
Payer: COMMERCIAL

## 2017-11-20 PROCEDURE — 25000132 ZZH RX MED GY IP 250 OP 250 PS 637: Performed by: OBSTETRICS & GYNECOLOGY

## 2017-11-20 PROCEDURE — 76819 FETAL BIOPHYS PROFIL W/O NST: CPT

## 2017-11-20 PROCEDURE — 12000028 ZZH R&B OB UMMC

## 2017-11-20 RX ADMIN — Medication 1 CAPSULE: at 08:09

## 2017-11-20 RX ADMIN — CLINDAMYCIN HYDROCHLORIDE 450 MG: 300 CAPSULE ORAL at 20:36

## 2017-11-20 RX ADMIN — CLINDAMYCIN HYDROCHLORIDE 450 MG: 300 CAPSULE ORAL at 16:02

## 2017-11-20 RX ADMIN — AZITHROMYCIN 250 MG: 250 TABLET, FILM COATED ORAL at 08:09

## 2017-11-20 RX ADMIN — NICOTINE 1 PATCH: 14 PATCH, EXTENDED RELEASE TRANSDERMAL at 21:49

## 2017-11-20 RX ADMIN — PRENATAL VIT W/ FE FUMARATE-FA TAB 27-0.8 MG 1 TABLET: 27-0.8 TAB at 08:09

## 2017-11-20 RX ADMIN — CLINDAMYCIN HYDROCHLORIDE 450 MG: 300 CAPSULE ORAL at 08:09

## 2017-11-20 NOTE — CONSULTS
.ante     [facility]          Neonatology Advanced Practice Antepartum Counseling Consult    I was asked to provide antepartum counseling for Naheed Crouch at the request of Rachel Gomez MD secondary to PPROM with current gestation of 27w1d. History is significant for ROM at 26w5d. Betamethasone was administered. Ms. Crouch, accompanied by her spouse and parents, was counseled on the expected hospital course, potential risks, and outcomes associated with an infant born at approximately 27 weeks gestation. The counseling included: morbidity, initial delivery room stabilization, respiratory course, lung development, RDS, patent ductus arteriosus, retinopathy of prematurity, hyperbilirubinemia, infection (including NEC), intraventricular hemorrhage, nutrition, growth and development, and long term outcomes.   I also explained the basic criteria for discharge: stable respiratory status, able to maintain their body temperature, and able to feed by bottle or breast well enough to maintain an adequate pattern of growth.    The patient had no remaining questions but was encouraged to contact the NICU via their caregivers should any arise. Thank you for involving the NICU team in the care of this patient.       Floor Time (min): 20  Face to Face Time (min): 5  Total Time (minutes): 25  More than 50% of my time was spent in direct, face to face, antepartum counseling with the above patient.      NARAYAN Pimentel, CNP   Advanced Practice Provider   Intensive Care Unit  [facility]

## 2017-11-20 NOTE — PROGRESS NOTES
SPIRITUAL HEALTH SERVICES  SPIRITUAL ASSESSMENT Progress Note  [facility] ([facility] PSCU     Initial visit with pt Naheed and partner Catalino based on her diagnosis of pprom and planned hospital admission until delivery. Naheed is currently at 27w2d. She and Catalino said that they were adjusting well to being in the hospital.     Introduced spiritual health services and offered emotional support. Nhaeed and Catalino declined ongoing chaplaincy support at this time. They are aware of [facility] availability and said they would reach out if they want further [facility] support.     PLAN: No follow up plan at this time but [facility] remains available for any further needs or requests.     TEE Gautam.  Associate    Pager [phone]

## 2017-11-20 NOTE — PLAN OF CARE
Problem: PROM, PPROM, Prolonged Rupture of Membranes (Adult,Obstetrics,Pediatric)  Goal: Signs and Symptoms of Listed Potential Problems Will be Absent, Minimized or Managed (PROM, PPROM, Prolonged Rupture of Membranes)  Signs and symptoms of listed potential problems will be absent, minimized or managed by discharge/transition of care (reference PROM, PPROM, Prolonged Rupture of Membranes (Adult,Obstetrics,Pediatric) CPG).   Outcome: No Change  Pt has no complaints. Continues to leak scant/small amounts of clear fluid. Denies contractions, bleeding. Continue current plan of care.

## 2017-11-20 NOTE — PROGRESS NOTES
MFM Antepartum Progress Note    Subjective:   She is 27 year old  at 27w2d with diagnosis of pPROM HD #5. Patient has completed course of  corticosteroids and is currently on course of latency antibiotics - will complete today.     Denies abdominal pain, cramping or contractions.  Some leaking of clear fluid.  No vaginal bleeding or spotting.       Objective:  Vitals:    17 2310 17 0329 17 1010 17 1335   BP: 108/61   114/56   Resp:    Temp: 97.9  F (36.6  C) 98  F (36.7  C) 98  F (36.7  C) 98.1  F (36.7  C)   TempSrc: Oral Oral Oral Oral   Weight:       Height:           Gen: Resting comfortably in bed, NAD  CV: Regular rate  Resp: Normal respiratory effort   Abd: Gravid, non-tender, non-distended  Ext: warm, well-perfused     FHT: Baseline 130s bpm, moderate variability, no accels, no decels  [place]: no contractions    Assessment/Plan:   Naheed Crouch is a 27 year old  @ 27w2d by LMP c/w 7w5d U/S, admitted as transfer of care from [place] for PPROM at 26w5d. Pregnancy is complicated by history of LEEP, tobacco use, obesity.     (1)  PPROM at 26w5d - Will proceed toward delivery if concerns arise regarding fetal status, labor, or infection.                     - PPROM work-up negative other than positive Amnisure: UA and UC, wet prep, GC/Chlam, UDS.  - GBS positive at OSH   - D#7/7 of latency antibiotics.   - Weekly limited U/S Mon/Thur - Today BPP       (2) FWB:   - FHT reassuring, TID monitoring - reassuring this am  - S/p BMZ (17-17)   - Plan on IV magnesium for neuroprotection if delivery is imminent before 32 weeks.   - S/p NICU consult  - Growth ultrasound last : EFW 38% (897g), MAUREEN 12.9cm, breech; next in 3-4 weeks.   - Limited OB U/S Mon/Thur - Today       (3) Tobacco use: on nicotine patch PRN    (4) PNC: A positive, Rubella immune, HIV neg, Hep B sAg neg. GCT 90.   - SW consult  - placenta anterior fundal  - s/p Flu, yet to  receive Tdap  - daily Prenatal vitamin      5) Mode of delivery:  - Expectant management until 34w or indication for delivery including intra-amniotic infection, abruption, fetal indications, labor.  -Breech on last U/S 11/17, would need CS at time of delivery  - If requires CS, patient desires BTL at time of CS, Private insurance.  Will rediscuss at time of delivery.       6) Dispo:  - Inpatient until delivery and postpartum recovery    Patient seen and staffed with Dr. Carter.     Yashira Eaton MD MPH  OB/GYN, PGY3  Pager: [phone]  11/20/2017  2:35 PM    Physician Attestation   I, Charles Carter, saw this patient with the resident and agree with the resident s findings and plan of care as documented in the resident s note.      I personally reviewed vital signs, medications and imaging.    Key findings: pPROM    Charles Carter  Date of Service (when I saw the patient): 11/20/17    Time Spent on this Encounter   I, Charles Carter, spent a total of 15 minutes bedside and on the inpatient unit today managing the care of Naheed Newtonmaynoradrien.  Over 50% of my time on the unit was spent counseling the patient and /or coordinating care regarding pPROM and expectant management. See note for details.    Please refer to ultrasound report under 'Imaging' Studies of 'Chart Review' tabs.    Charles Carter M.D.

## 2017-11-20 NOTE — PLAN OF CARE
VSS. Leaking scant amounts of clear fluid. Pt complains of mild backache. Denies pain, cramping and bleeding. Pt reports feeling more energetic and hopeful today. Reports the nicotine patch working well for her and that she is motivated to quit smoking for good. Family present and supportive this evening.

## 2017-11-20 NOTE — PLAN OF CARE
Problem: PROM, PPROM, Prolonged Rupture of Membranes (Adult,Obstetrics,Pediatric)  Goal: Signs and Symptoms of Listed Potential Problems Will be Absent, Minimized or Managed (PROM, PPROM, Prolonged Rupture of Membranes)  Signs and symptoms of listed potential problems will be absent, minimized or managed by discharge/transition of care (reference PROM, PPROM, Prolonged Rupture of Membranes (Adult,Obstetrics,Pediatric) CPG).   Outcome: No Change  Afebrile, VSS. Pt denies bldg, pain, ctx. Continues to have clear LOF, small amount w/o any gushes. FHR AGA w/mod variability, +acels, no decels. Slept well overnight, no complaints. Continue plan of care, contact MD with questions/concerns.

## 2017-11-21 PROCEDURE — 25000132 ZZH RX MED GY IP 250 OP 250 PS 637: Performed by: OBSTETRICS & GYNECOLOGY

## 2017-11-21 PROCEDURE — 12000028 ZZH R&B OB UMMC

## 2017-11-21 RX ORDER — CALCIUM CARBONATE 500 MG/1
500 TABLET, CHEWABLE ORAL 3 TIMES DAILY PRN
Status: DISCONTINUED | OUTPATIENT
Start: 2017-11-21 | End: 2017-12-15

## 2017-11-21 RX ADMIN — CLINDAMYCIN HYDROCHLORIDE 450 MG: 300 CAPSULE ORAL at 13:56

## 2017-11-21 RX ADMIN — NICOTINE 1 PATCH: 14 PATCH, EXTENDED RELEASE TRANSDERMAL at 21:58

## 2017-11-21 RX ADMIN — CALCIUM CARBONATE (ANTACID) CHEW TAB 500 MG 500 MG: 500 CHEW TAB at 18:16

## 2017-11-21 RX ADMIN — AZITHROMYCIN 250 MG: 250 TABLET, FILM COATED ORAL at 08:02

## 2017-11-21 RX ADMIN — PRENATAL VIT W/ FE FUMARATE-FA TAB 27-0.8 MG 1 TABLET: 27-0.8 TAB at 08:02

## 2017-11-21 RX ADMIN — CLINDAMYCIN HYDROCHLORIDE 450 MG: 300 CAPSULE ORAL at 08:02

## 2017-11-21 RX ADMIN — CLINDAMYCIN HYDROCHLORIDE 450 MG: 300 CAPSULE ORAL at 19:49

## 2017-11-21 RX ADMIN — Medication 1 CAPSULE: at 09:47

## 2017-11-21 NOTE — PROGRESS NOTES
[facility] Antepartum Progress Note    Subjective:   Patient doing well. No concerns. Denies abdominal pain, cramping or contractions, vaginal bleeding or spotting.       Objective:  Vitals:    17 0000 17 0530 17 0948 17 1255   BP:  118/69 125/76    Resp:     Temp: 98.2  F (36.8  C) 97.7  F (36.5  C) 97.9  F (36.6  C) 98.1  F (36.7  C)   TempSrc: Oral Oral Oral Axillary   Weight:  109.6 kg (241 lb 9.6 oz)     Height:           Gen: Resting comfortably in bed, NAD  CV: Regular rate  Resp: Normal respiratory effort   Abd: Gravid, non-tender, non-distended  Ext: warm, well-perfused     FHT: Baseline 140s bpm, moderate variability, no accels, minor rare annabelle. decels present - consistent with GA  [place]: no contractions    Assessment/Plan:   Naheed Crouch is a 27 year old  @ 27w3d by LMP c/w 7w5d U/S, admitted as transfer of care from [place] for PPROM at 26w5d. Pregnancy is complicated by history of LEEP, tobacco use, obesity.     (1)  PPROM at 26w5d - Will proceed toward delivery if concerns arise regarding fetal status, labor, or infection.                     - PPROM work-up negative other than positive Amnisure: UA and UC, wet prep, GC/Chlam, UDS.  - GBS positive at OSH   - s/p latency antibiotics.   - Weekly limited U/S Mon/Thur - Today BPP       (2) FWB:   - FHT reassuring, TID monitoring  - S/p BMZ (17-17)   - Plan on IV magnesium for neuroprotection if delivery is imminent before 32 weeks.   - S/p NICU consult  - Growth ultrasound last : EFW 38% (897g), MAUREEN 12.9cm, breech; next in 3-4 weeks.   - Limited OB U/S Mon/Thur - Today       (3) Tobacco use: on nicotine patch PRN    (4) PNC: A positive, Rubella immune, HIV neg, Hep B sAg neg. GCT 90.   - SW consult  - placenta anterior fundal  - s/p Flu, yet to receive Tdap  - daily Prenatal vitamin      5) Mode of delivery:  - Expectant management until 34w or indication for delivery including intra-amniotic  infection, abruption, fetal indications, labor.  -Breech on last U/S 11/17, would need CS at time of delivery  - If requires CS, patient desires BTL at time of CS, Private insurance.  Will rediscuss at time of delivery.       6) Dispo:  - Inpatient until delivery and postpartum recovery    Patient seen and staffed with Dr. Carter.     Yashira Eaton MD MPH  OB/GYN, PGY3  Pager: [phone]  11/21/2017    Physician Attestation   I, Charles Carter, saw this patient with the resident and agree with the resident s findings and plan of care as documented in the resident s note.      I personally reviewed vital signs and medications.    Key findings: pPROM    Charles Carter  Date of Service (when I saw the patient): 11/21/17    Time Spent on this Encounter   I, Charles Carter, spent a total of 15 minutes bedside and on the inpatient unit today managing the care of Naheed Crouch.  Over 50% of my time on the unit was spent counseling the patient and /or coordinating care regarding pPROM. See note for details.

## 2017-11-21 NOTE — PLAN OF CARE
Problem: PROM, PPROM, Prolonged Rupture of Membranes (Adult,Obstetrics,Pediatric)  Goal: Signs and Symptoms of Listed Potential Problems Will be Absent, Minimized or Managed (PROM, PPROM, Prolonged Rupture of Membranes)  Signs and symptoms of listed potential problems will be absent, minimized or managed by discharge/transition of care (reference PROM, PPROM, Prolonged Rupture of Membranes (Adult,Obstetrics,Pediatric) CPG).   Afebrile. No contractions per EFM or pt, FHR reactive. Continues to leak small amounts of clear fluid. Patient denies cramping  or bleeding.

## 2017-11-21 NOTE — PLAN OF CARE
Problem: PROM, PPROM, Prolonged Rupture of Membranes (Adult,Obstetrics,Pediatric)  Goal: Signs and Symptoms of Listed Potential Problems Will be Absent, Minimized or Managed (PROM, PPROM, Prolonged Rupture of Membranes)  Signs and symptoms of listed potential problems will be absent, minimized or managed by discharge/transition of care (reference PROM, PPROM, Prolonged Rupture of Membranes (Adult,Obstetrics,Pediatric) CPG).   Afebrile. No contractions on EFM this evening. FHR reactive. Continues to leak small amounts of clear fluid. Patient denies cramping  or bleeding.

## 2017-11-21 NOTE — PLAN OF CARE
Problem: PROM, PPROM, Prolonged Rupture of Membranes (Adult,Obstetrics,Pediatric)  Goal: Signs and Symptoms of Listed Potential Problems Will be Absent, Minimized or Managed (PROM, PPROM, Prolonged Rupture of Membranes)  Signs and symptoms of listed potential problems will be absent, minimized or managed by discharge/transition of care (reference PROM, PPROM, Prolonged Rupture of Membranes (Adult,Obstetrics,Pediatric) CPG).   Outcome: Improving  Patient VSS. Patient denies cramping, contractions, or bleeding. Patient leaking scant amt of clear drainage. FHT this late morning/early afternoon, initially had two variable decelerations to the 70s for about 90-100sec each, both resolved on own. Had patient reposition on side and no more variables had occurred the next 65 mins following of monitoring, variability moderate and accels present. Will continue to monitor patient closely for any maternal or fetal changes.

## 2017-11-21 NOTE — PROVIDER NOTIFICATION
11/21/17 1203   Provider Notification   Provider Name/Title Dr Carter   Method of Notification In Department   Request Evaluate - Remote   Notification Reason Decels   FHT had 2 deep variable decels for about 90 seconds-100seconds. Had patient turn on her side and variables have resolved.

## 2017-11-22 PROCEDURE — 25000132 ZZH RX MED GY IP 250 OP 250 PS 637: Performed by: OBSTETRICS & GYNECOLOGY

## 2017-11-22 PROCEDURE — 12000028 ZZH R&B OB UMMC

## 2017-11-22 RX ADMIN — CLINDAMYCIN HYDROCHLORIDE 450 MG: 300 CAPSULE ORAL at 15:48

## 2017-11-22 RX ADMIN — PRENATAL VIT W/ FE FUMARATE-FA TAB 27-0.8 MG 1 TABLET: 27-0.8 TAB at 08:04

## 2017-11-22 RX ADMIN — AZITHROMYCIN 250 MG: 250 TABLET, FILM COATED ORAL at 08:04

## 2017-11-22 RX ADMIN — CLINDAMYCIN HYDROCHLORIDE 450 MG: 300 CAPSULE ORAL at 20:33

## 2017-11-22 RX ADMIN — Medication 1 CAPSULE: at 06:34

## 2017-11-22 RX ADMIN — NICOTINE 1 PATCH: 14 PATCH, EXTENDED RELEASE TRANSDERMAL at 21:49

## 2017-11-22 RX ADMIN — CLINDAMYCIN HYDROCHLORIDE 450 MG: 300 CAPSULE ORAL at 08:04

## 2017-11-22 NOTE — PLAN OF CARE
Problem: Patient Care Overview  Goal: Plan of Care/Patient Progress Review   Premature Rupture of Membranes  Data: Afebrile. Leaking scant  amounts of clear fluid. Contraction pattern stable and within parameters. Fetal assessment Reactive. Signs and symptoms of infection absent. Latency antibiotic course in progress.  Betamethasone Completed given on  and .  Interventions: Monitor vital signs and indicators of infection every 4 hours while awake. Continue uterine/fetal assessment 3 times daily.  BPP/doppler every Monday, Thursday,  Activity level: Regular activity. Preventive measures include Medications, Positioning and Frequent voiding. Encourage active range of motion and frequent position changes.  Plan: Continue expectant management. Observe for and notify care provider of indicators of progressing labor, signs/symptoms of infection, or fetal/maternal compromise.

## 2017-11-22 NOTE — PROGRESS NOTES
"CLINICAL NUTRITION SERVICES - ASSESSMENT NOTE     Nutrition Prescription    RECOMMENDATIONS FOR MDs/PROVIDERS TO ORDER:  None today    Malnutrition Status:    Patient does not meet two of the above criteria necessary for diagnosing malnutrition    Recommendations already ordered by Registered Dietitian (RD):  None today    Future/Additional Recommendations:  None today     REASON FOR ASSESSMENT  Naheed Crouch is a/an 27 year old female assessed by the dietitian for LOS    NUTRITION HISTORY  - Pt endorses a good appetite and eating at baseline. She usually eats 2-3 meals a day at home and snacks throughout the day. A family member makes dinner for pt and she reports eating mostly home cooked meals. Prior to becoming pregnant she was drinking \"a lot\" of pop and coffee however since becoming pregnant she has cut down significantly. She drinks water, coffee, pop and apple juice.   - She denies any food allergies/intolerances but does avoid milk and yogurt d/t taste/texture.     CURRENT NUTRITION ORDERS  Diet: Regular  Intake/Tolerance: on average, pt has been ordering 1850 kcal and 80 g protein. This is meeting 100% of her nutritional needs.     LABS  Labs reviewed  - Glucose Screen 1.5 hrs 90 (wnl - 11/13)    MEDICATIONS  Medications reviewed  - prenatal vitamin     ANTHROPOMETRICS  Height: 172.7 cm (5' 8\")  Most Recent Weight: 109.6 kg (241 lb 9.6 oz)    IBW: 63.6 kg  BMI: Obesity Grade II BMI 35-39.9  Weight History: pt has gained ~6 lbs so far this pregnancy.   It is recommended pt gain at least 15 lbs during the entire pregnancy or 0.2-0.45 lbs per week after the first 12 weeks based on her prepregnancy BMI.   Wt Readings from Last 10 Encounters:   11/21/17 109.6 kg (241 lb 9.6 oz)   11/13/17 110.2 kg (243 lb)   10/19/17 108.2 kg (238 lb 9.6 oz)   09/18/17 108.8 kg (239 lb 12.8 oz)   07/31/17 108 kg (238 lb 3.2 oz)   07/05/17 107.6 kg (237 lb 3.2 oz)   07/15/13 110.1 kg (242 lb 12.8 oz)   06/12/13 109.8 kg (242 " lb)   02/25/13 109.8 kg (242 lb)   01/28/13 108.9 kg (240 lb)     Dosing Weight: 74 kg - adjusted from likely prepregnancy weight    ASSESSED NUTRITION NEEDS  Estimated Energy Needs: 8910-3233 kcals/day (20 - 25 kcals/kg + 450 kcal/day)  Justification: Pregnancy, Obese  Estimated Protein Needs:  grams protein/day (1.2 - 1.5 grams of pro/kg)  Justification: Pregnancy  Estimated Fluid Needs: 30 - 35 mL/kg  Justification: Pregnancy    PHYSICAL FINDINGS  See malnutrition section below.  27w4d    MALNUTRITION  % Intake: No decreased intake noted  % Weight Loss: None noted  Subcutaneous Fat Loss: None observed  Muscle Loss: None observed  Fluid Accumulation/Edema: None noted  Malnutrition Diagnosis: Patient does not meet two of the above criteria necessary for diagnosing malnutrition    NUTRITION DIAGNOSIS  No nutrition diagnosis at this time    INTERVENTIONS  Implementation  Discussed nutrition history and PO since admission. Discussed menu ordering and snacks available on the unit. Pt has been enjoying the food and has been getting food from home/restaurants. She feels like she is eating well and denies any N/V or stomach pain. Encouraged adequate PO of food and fluids.    Monitoring/Evaluation  Will be monitored and evaluated per protocol q 14 days.       Jessi Lovelace RD, LD  Unit Pager: [phone]

## 2017-11-22 NOTE — PLAN OF CARE
Problem: Patient Care Overview  Goal: Plan of Care/Patient Progress Review  Outcome: No Change  VSS. Afebrile.  FHT- appropriate for gestational age.  Denies contractions. Continues to leak a small amount of clear fluid.  Changed IV dressing. Pt spent the evening playing video games of .

## 2017-11-22 NOTE — PROGRESS NOTES
"M Antepartum Progress Note    Subjective:   Patient doing well. No concerns other than ongoing \"sciatica pain\" on the left side. Would appreciate abdominal binder or PT referral.  No changes over the past day.   Denies abdominal pain, cramping or contractions, vaginal bleeding or spotting.       Objective:  Vitals:    17 2352 17 0527 17 0805 17 1545   BP:  114/74 129/62 118/66   Resp:     Temp: 98  F (36.7  C) 97.7  F (36.5  C) 98  F (36.7  C) 98.7  F (37.1  C)   TempSrc: Oral Oral Oral Oral   Weight:       Height:           Gen: Resting comfortably, sitting on edge of bed  CV: Regular rate  Resp: Normal respiratory effort   Abd: Gravid, non-tender, non-distended  Ext: warm, well-perfused     FHT: Baseline 130s bpm, moderate variability, no accels, minor rare annabelle. decels present - consistent with GA  [place]: no contractions    Assessment/Plan:   Naheed Crouch is a 27 year old  @ 27w4d by LMP c/w 7w5d U/S, admitted as transfer of care from [place] for PPROM at 26w5d. Pregnancy is complicated by history of LEEP, tobacco use, obesity.     (1)  PPROM at 26w5d - Will proceed toward delivery if concerns arise regarding fetal status, labor, or infection.                     - PPROM work-up negative other than positive Amnisure: UA and UC, wet prep, GC/Chlam, UDS.  - GBS positive at OSH   - s/p latency antibiotics.   - Weekly limited U/S Mon/Thur - Today BPP       (2) FWB:   - FHT reassuring, TID monitoring  - S/p BMZ (17-17)   - Plan on IV magnesium for neuroprotection if delivery is imminent before 32 weeks.   - S/p NICU consult  - Growth ultrasound last : EFW 38% (897g), MAUREEN 12.9cm, breech; next in 3-4 weeks.   - Limited OB U/S Mon/Thur - Today       (3) Tobacco use: on nicotine patch PRN    (4) PNC: A positive, Rubella immune, HIV neg, Hep B sAg neg. GCT 90.   - SW consult  - placenta anterior fundal  - s/p Flu, yet to receive Tdap  - daily Prenatal " vitamin      5) Mode of delivery:  - Expectant management until 34w or indication for delivery including intra-amniotic infection, abruption, fetal indications, labor.  -Breech on last U/S 11/17, would need CS at time of delivery  - If requires CS, patient desires BTL at time of CS, Private insurance.  Will rediscuss at time of delivery.     6) Sciatica/left lower back pain: abdominal binder and PT consult.       7) Dispo:  - Inpatient until delivery and postpartum recovery    Patient seen and staffed with Dr. Carter.     Yashira Eaton MD MPH  OB/GYN, PGY3  Pager: [phone]  11/22/2017

## 2017-11-22 NOTE — PLAN OF CARE
Problem: Patient Care Overview  Goal: Plan of Care/Patient Progress Review  Outcome: No Change  VSS.  Afebrile. FHT- appropriate for gestational age.  Continues to have a scant amount of clear fluid leaking.  Denies contractions.  Slept well overnight.

## 2017-11-22 NOTE — PROGRESS NOTES
[facility]  MATERNAL CHILD HEALTH   SOCIAL WORK PROGRESS NOTE      Attempted to met with Naheed finley. She was asleep. This writer provided mom with a [employer] application, as she has identified the financial stress associated with being hospitalized. This writer sent mom an e-mail updated her and checked in with bedside RN. This writer will attempt to meet with mom again today or Friday. Social work will continue to assess needs and provide ongoing psychosocial support and access to resources.     HARRISON Tang, [facility]   Social Worker  Maternal Child Health   Phone: [phone]  Pager: [phone]

## 2017-11-23 ENCOUNTER — HOSPITAL ENCOUNTER (INPATIENT)
Dept: ULTRASOUND IMAGING | Facility: CLINIC | Age: 27
End: 2017-11-23
Attending: OBSTETRICS & GYNECOLOGY
Payer: COMMERCIAL

## 2017-11-23 ENCOUNTER — APPOINTMENT (OUTPATIENT)
Dept: PHYSICAL THERAPY | Facility: CLINIC | Age: 27
End: 2017-11-23
Payer: COMMERCIAL

## 2017-11-23 PROCEDURE — 90686 IIV4 VACC NO PRSV 0.5 ML IM: CPT | Performed by: OBSTETRICS & GYNECOLOGY

## 2017-11-23 PROCEDURE — 97161 PT EVAL LOW COMPLEX 20 MIN: CPT | Mod: GP | Performed by: PHYSICAL THERAPIST

## 2017-11-23 PROCEDURE — 25000132 ZZH RX MED GY IP 250 OP 250 PS 637: Performed by: OBSTETRICS & GYNECOLOGY

## 2017-11-23 PROCEDURE — 97110 THERAPEUTIC EXERCISES: CPT | Mod: GP | Performed by: PHYSICAL THERAPIST

## 2017-11-23 PROCEDURE — 40000193 ZZH STATISTIC PT WARD VISIT: Performed by: PHYSICAL THERAPIST

## 2017-11-23 PROCEDURE — 12000028 ZZH R&B OB UMMC

## 2017-11-23 PROCEDURE — 76819 FETAL BIOPHYS PROFIL W/O NST: CPT

## 2017-11-23 PROCEDURE — 25000128 H RX IP 250 OP 636: Performed by: OBSTETRICS & GYNECOLOGY

## 2017-11-23 RX ORDER — AMOXICILLIN 250 MG
1 CAPSULE ORAL 2 TIMES DAILY
Status: DISCONTINUED | OUTPATIENT
Start: 2017-11-24 | End: 2017-12-15

## 2017-11-23 RX ORDER — FERROUS SULFATE 325(65) MG
325 TABLET ORAL DAILY
Status: DISCONTINUED | OUTPATIENT
Start: 2017-11-24 | End: 2017-11-25

## 2017-11-23 RX ADMIN — PRENATAL VIT W/ FE FUMARATE-FA TAB 27-0.8 MG 1 TABLET: 27-0.8 TAB at 08:58

## 2017-11-23 RX ADMIN — CLINDAMYCIN HYDROCHLORIDE 450 MG: 300 CAPSULE ORAL at 14:05

## 2017-11-23 RX ADMIN — CLINDAMYCIN HYDROCHLORIDE 450 MG: 300 CAPSULE ORAL at 08:58

## 2017-11-23 RX ADMIN — INFLUENZA A VIRUS A/MICHIGAN/45/2015 X-275 (H1N1) ANTIGEN (FORMALDEHYDE INACTIVATED), INFLUENZA A VIRUS A/HONG KONG/4801/2014 X-263B (H3N2) ANTIGEN (FORMALDEHYDE INACTIVATED), INFLUENZA B VIRUS B/PHUKET/3073/2013 ANTIGEN (FORMALDEHYDE INACTIVATED), AND INFLUENZA B VIRUS B/BRISBANE/60/2008 ANTIGEN (FORMALDEHYDE INACTIVATED) 0.5 ML: 15; 15; 15; 15 INJECTION, SUSPENSION INTRAMUSCULAR at 09:00

## 2017-11-23 RX ADMIN — NICOTINE 1 PATCH: 14 PATCH, EXTENDED RELEASE TRANSDERMAL at 22:09

## 2017-11-23 RX ADMIN — Medication 1 CAPSULE: at 06:03

## 2017-11-23 NOTE — PROGRESS NOTES
[facility] Antepartum Progress Note    Subjective:   Patient doing well. No concerns today.Having family stop by for thanksgiving.   Denies abdominal pain, cramping or contractions, vaginal bleeding or spotting.       Objective:  Vitals:    17 2335 17 0330 17 0847 17 0903   BP: 106/61 99/57 115/72    Resp: 16 16     Temp: 97.6  F (36.4  C) 97.7  F (36.5  C)  97.6  F (36.4  C)   TempSrc: Oral Oral  Oral   Weight:       Height:           Gen: Resting comfortably, sitting in chair on laptop  CV: Regular rate  Resp: Normal respiratory effort   Abd: Gravid, non-tender, non-distended  Ext: warm, well-perfused     FHT: Baseline 120s bpm, moderate variability, no accels, rare annabelle. decels present - consistent with GA  [place]: no contractions    Imaging:  See imaging tab for report of U/S from today.     Assessment/Plan:   Naheed Crouch is a 27 year old  @ 27w5d by LMP c/w 7w5d U/S, admitted as transfer of care from [place] for PPROM at 26w5d. Pregnancy is complicated by history of LEEP, tobacco use, obesity.     (1)  PPROM at 26w5d - Will proceed toward delivery if concerns arise regarding fetal status, labor, or infection.                     - PPROM work-up negative other than positive Amnisure: UA and UC, wet prep, GC/Chlam, UDS.  - GBS positive at OSH   - s/p latency antibiotics.   - Weekly limited U/S Mon/Thur - Today BPP       (2) FWB:   - FHT reassuring, TID monitoring  - S/p BMZ (17-17)   - Plan on IV magnesium for neuroprotection if delivery is imminent before 32 weeks.   - S/p NICU consult  - Growth ultrasound last : EFW 38% (897g), MAUREEN 12.9cm, breech; next in 3-4 weeks.   - Limited OB U/S Mon/Thur - Today       (3) Tobacco use: on nicotine patch PRN    (4) PNC: A positive, Rubella immune, HIV neg, Hep B sAg neg. GCT 90.   - SW consult  - placenta anterior fundal  - s/p Flu, yet to receive Tdap  - daily Prenatal vitamin      5) Mode of delivery:  - Expectant  management until 34w or indication for delivery including intra-amniotic infection, abruption, fetal indications, labor.  -Breech on last U/S 11/17, would need CS at time of delivery  - If requires CS, patient desires BTL at time of CS, Private insurance.  Will rediscuss at time of delivery.     6) Sciatica/left lower back pain: abdominal binder and PT consult. Still has yet to see patient.       7) Dispo:  - Inpatient until delivery and postpartum recovery    Patient seen and staffed with Dr. Carter.     Yashira Eaton MD MPH  OB/GYN, PGY3  Pager: [phone]  11/23/2017

## 2017-11-23 NOTE — PLAN OF CARE
Problem: Patient Care Overview  Goal: Plan of Care/Patient Progress Review  Outcome: No Change   Premature Rupture of Membranes  Data: Afebrile. Leaking no fluid this evening. Contraction pattern stable and within parameters. Fetal assessment Appropriate for Gestational Age. Signs and symptoms of infection absent.  Latency antibiotic course in progress.  Betamethasone Completed given on  and   Interventions: Monitor vital signs and indicators of infection every 4 hours while awake. Continue uterine/fetal assessment 3 times daily.  BPP/doppler every Mon/Thurs  Activity level: Regular activity. Preventive measures include Medications. Encourage active range of motion and frequent position changes.  Plan: Continue expectant management. Observe for and notify care provider of indicators of progressing labor, signs/symptoms of infection, or fetal/maternal compromise.

## 2017-11-23 NOTE — PLAN OF CARE
Problem: PT General Care Plan  Goal: PT Goal 1  PT Goal 1  Pt will demonstrate understanding and compliance with stretching/strengthening HEP for independent pain management and preserved activity tolerance, to ensure safe return to home once medically ready for discharge.

## 2017-11-23 NOTE — PLAN OF CARE
Problem: Patient Care Overview  Goal: Plan of Care/Patient Progress Review  Discharge Planner PT   Patient plan for discharge: Home  Current status: Pt is IND with all functional mobility. Notes intermittent LLE sciatic pain.  Barriers to return to prior living situation: Medical stability.  Recommendations for discharge: Home  Rationale for recommendations: Pt is IND with mobility       Entered by: Rajani Reynolds 11/23/2017 10:59 AM

## 2017-11-23 NOTE — PROGRESS NOTES
" 17 1100   Quick Adds   Type of Visit Initial PT Evaluation   Living Environment   Lives With significant other   Self-Care   Usual Activity Tolerance good   Current Activity Tolerance good   Equipment Currently Used at Home none   Activity/Exercise/Self-Care Comment IND with all functional mobility and amb   Functional Level Prior   Ambulation 0-->independent   Transferring 0-->independent   Toileting 0-->independent   Bathing 0-->independent   Dressing 0-->independent   Eating 0-->independent   Communication 0-->understands/communicates without difficulty   Swallowing 0-->swallows foods/liquids without difficulty   Cognition 0 - no cognition issues reported   Fall history within last six months no   Which of the above functional risks had a recent onset or change? none   Prior Functional Level Comment Pt IND with all mobility. Reports LLE sciatic nerve pain waxes and wanes- denies numbness/tingling or weakness.   General Information   Onset of Illness/Injury or Date of Surgery - Date 17   Referring Physician  Charles Carter MD    Patient/Family Goals Statement Manage pain   Pertinent History of Current Problem (include personal factors and/or comorbidities that impact the POC) Per chart review, pt is a \"27 year old  @ 27w4d by LMP c/w 7w5d U/S, admitted as transfer of care from [place] for PPROM at 26w5d. Pregnancy is complicated by history of LEEP, tobacco use, obesity. Presents with L sciatic pain.\"   Precautions/Limitations no known precautions/limitations   Heart Disease Risk Factors Overweight;Lack of physical activity   General Observations Pt resting in bed, agreeable to PT   General Info Comments Activity: Up ad theodora   Cognitive Status Examination   Orientation orientation to person, place and time   Level of Consciousness alert   Follows Commands and Answers Questions 100% of the time   Personal Safety and Judgment intact   Memory intact   Pain Assessment   Patient Currently in " Pain No  (Notes intermittent pain at L hip (sciatic))   Posture    Posture Not impaired   Range of Motion (ROM)   ROM Comment BLE AROM WFL per functional mobility assessment. Decreased flexibiltiy from baseline and increased muscle tension noted likely d/t decreased OOB activity.   Strength   Strength Comments 5/5 strength at BLE (hip flexors, knee extensors/flexors, and ankle dorsiflexors tested sitting EOB). Glut strength WFL with IND STS without use of UE.   Bed Mobility   Bed Mobility Comments Pt tx supine<>sit at EOB IND   Transfer Skills   Transfer Comments Pt tx sit<>stand without use of UE IND   Gait   Gait Comments Pt IND ambulating within room   Balance   Balance Comments Good, pt transfers and amb IND with no AD or LOB.   Sensory Examination   Sensory Perception Comments Pt denies numbness/tingling throughout extremities.   Modality Interventions   Planned Modality Interventions Cryotherapy;Thermotherapy: Hydrocollator Packs   General Therapy Interventions   Planned Therapy Interventions stretching;risk factor education;home program guidelines;progressive activity/exercise   Clinical Impression   Criteria for Skilled Therapeutic Intervention yes, treatment indicated   PT Diagnosis Decreased activity tolerance   Influenced by the following impairments Decreased flexibility, pain, decreased endurance   Functional limitations due to impairments Prolonged OOB activity   Clinical Presentation Stable/Uncomplicated   Clinical Presentation Rationale Anticipate prolonged LOS, mobiltiy and pain management may fluctuate pending pregnancy progression and activity tolerance/immobility.   Clinical Decision Making (Complexity) Low complexity   Therapy Frequency` 1 time/week   Predicted Duration of Therapy Intervention (days/wks) 2 weeks   Anticipated Discharge Disposition Home   Risk & Benefits of therapy have been explained Yes   Patient, Family & other staff in agreement with plan of care Yes   Clinical Impression  "Comments Pt would benefit from skilled PT services to initiate and progress HEP for stretching/strengthening to manage pain and minimize deconditioning during hosptial stay.    [facility] AM-PAC TM \"6 Clicks\"   2016, Trustees of [facility], under license to [employer].  All rights reserved.   6 Clicks Short Forms Basic Mobility Inpatient Short Form   [facility] AM-PAC  \"6 Clicks\" V.2 Basic Mobility Inpatient Short Form   1. Turning from your back to your side while in a flat bed without using bedrails? 4 - None   2. Moving from lying on your back to sitting on the side of a flat bed without using bedrails? 4 - None   3. Moving to and from a bed to a chair (including a wheelchair)? 4 - None   4. Standing up from a chair using your arms (e.g., wheelchair, or bedside chair)? 4 - None   5. To walk in hospital room? 4 - None   6. Climbing 3-5 steps with a railing? 4 - None   Basic Mobility Raw Score (Score out of 24.Lower scores equate to lower levels of function) 24   Total Evaluation Time   Total Evaluation Time (Minutes) 5     "

## 2017-11-23 NOTE — PLAN OF CARE
Problem: PROM, PPROM, Prolonged Rupture of Membranes (Adult,Obstetrics,Pediatric)  Goal: Signs and Symptoms of Listed Potential Problems Will be Absent, Minimized or Managed (PROM, PPROM, Prolonged Rupture of Membranes)  Signs and symptoms of listed potential problems will be absent, minimized or managed by discharge/transition of care (reference PROM, PPROM, Prolonged Rupture of Membranes (Adult,Obstetrics,Pediatric) CPG).   Outcome: Improving  Pt. Denies any signs of infection. No changes of clear fluid.  Will alert nurse if she has odor, bleeding or abd pain.  [place]

## 2017-11-23 NOTE — PLAN OF CARE
Problem: PROM, PPROM, Prolonged Rupture of Membranes (Adult,Obstetrics,Pediatric)  Goal: Signs and Symptoms of Listed Potential Problems Will be Absent, Minimized or Managed (PROM, PPROM, Prolonged Rupture of Membranes)  Signs and symptoms of listed potential problems will be absent, minimized or managed by discharge/transition of care (reference PROM, PPROM, Prolonged Rupture of Membranes (Adult,Obstetrics,Pediatric) CPG).   Outcome: No Change  Data: Afebrile, VSS. Leaking a small amt of clear fluid. Contraction pattern stable and within parameters. Fetal assessment AGA. S/s of infection absent.  Receiving PO clindamycin 3x daily. Betamethasone completed 11/16 and 11/17.  Interventions: Monitor VS and indicators of infection Q4hr. TID monitoring.   Plan: Continue to monitor and follow POC. Observe for and notify provider of indicators of progressing labor, signs and symptoms of infection, or fetal/maternal compromise.

## 2017-11-24 PROCEDURE — 25000132 ZZH RX MED GY IP 250 OP 250 PS 637: Performed by: OBSTETRICS & GYNECOLOGY

## 2017-11-24 PROCEDURE — 12000028 ZZH R&B OB UMMC

## 2017-11-24 RX ADMIN — SENNOSIDES AND DOCUSATE SODIUM 1 TABLET: 8.6; 5 TABLET ORAL at 21:04

## 2017-11-24 RX ADMIN — Medication 1 CAPSULE: at 06:25

## 2017-11-24 RX ADMIN — NICOTINE 1 PATCH: 14 PATCH, EXTENDED RELEASE TRANSDERMAL at 22:10

## 2017-11-24 RX ADMIN — PRENATAL VIT W/ FE FUMARATE-FA TAB 27-0.8 MG 1 TABLET: 27-0.8 TAB at 09:05

## 2017-11-24 NOTE — PLAN OF CARE
Problem: PROM, PPROM, Prolonged Rupture of Membranes (Adult,Obstetrics,Pediatric)  Goal: Signs and Symptoms of Listed Potential Problems Will be Absent, Minimized or Managed (PROM, PPROM, Prolonged Rupture of Membranes)  Signs and symptoms of listed potential problems will be absent, minimized or managed by discharge/transition of care (reference PROM, PPROM, Prolonged Rupture of Membranes (Adult,Obstetrics,Pediatric) CPG).   Outcome: Improving  Pt stable, VS WDL. Pt able to sleep throughout night. Pt denies pain, bleeding. Leaking scant amounts of clear fluid. Denies ctx, no ctx per toco. FHR appropriate for gestational age.

## 2017-11-24 NOTE — PLAN OF CARE
Problem: Patient Care Overview  Goal: Plan of Care/Patient Progress Review  Outcome: No Change  Data: Maternal status is stable.  VS WNL.  Patient denies cramping, ctx, pain, or vaginal bleeding.  Continues to leak scant clear fluid.  FHR had one PD 2 min long from baseline 140 to norbert 110.   Action: Continuing to monitor FHR, no further decels noted.  Provider updated.  Continue with plan of care, which is monitor for s/s of maternal/fetal compromise. Patient encouraged to move extremities while in bed.  Response: Patient coping well.  Support person present.

## 2017-11-24 NOTE — PROGRESS NOTES
[facility] Antepartum Progress Note    Subjective:   Patient doing well. Having some increased muscle soreness and sciatica pain since doing exercise with PT yesterday.  Happy to receive exercises to do and glad to be able to hopefully improve sciatica pain.  Denies abdominal pain, cramping or contractions, vaginal bleeding or spotting, fevers or chills.       Objective:  Vitals:    17 2345 17 0623 17 0904 17 1145   BP: 103/57 117/63 126/75 118/69   Resp:    Temp: 98.4  F (36.9  C)  97.7  F (36.5  C) 97.9  F (36.6  C)   TempSrc: Oral  Oral Oral   Weight:       Height:           Gen: Resting comfortably, sitting on edge of bed  CV: Regular rate  Resp: Normal respiratory effort   Abd: Gravid, non-tender, non-distended  Ext: warm, well-perfused, trace edema    FHT: Baseline 150s bpm, moderate variability, no accels, no decels present - consistent with GA  [place]: no contractions or irritability    Imaging:  See imaging tab for report of U/S from     Assessment/Plan:   Naheed Crouch is a 27 year old  @ 27w6d by LMP c/w 7w5d U/S, admitted as transfer of care from [place] for PPROM at 26w5d. Pregnancy is complicated by history of LEEP, tobacco use, obesity.     (1)  PPROM at 26w5d - Will proceed toward delivery if concerns arise regarding fetal status, labor, or infection.                     - PPROM work-up negative other than positive Amnisure: UA and UC, wet prep, GC/Chlam, UDS.  - GBS positive at OSH   - s/p latency antibiotics.   - Weekly limited U/S Mon/Thur -  BPP       (2) FWB:   - FHT reassuring, TID monitoring  - S/p BMZ (17-17)   - Plan on IV magnesium for neuroprotection if delivery is imminent before 32 weeks.   - S/p NICU consult  - Growth ultrasound last : EFW 38% (897g), MAUREEN 12.9cm, breech; next in 3-4 weeks.   - Limited OB U/S Mon/Thur - Today       (3) Tobacco use: on nicotine patch PRN    (4) PNC: A positive, Rubella immune, HIV neg,  Hep B sAg neg. GCT 90.   - SW consult  - placenta anterior fundal  - s/p Flu, yet to receive Tdap  - daily Prenatal vitamin      5) Mode of delivery:  - Expectant management until 34w or indication for delivery including intra-amniotic infection, abruption, fetal indications, labor.  -Breech on last U/S 11/17, would need CS at time of delivery  - If requires CS, patient desires BTL at time of CS, Private insurance.  Will rediscuss at time of delivery.     6) Sciatica/left lower back pain: s/p PT consult with daily exercises given from PT.       7) Dispo:  - Inpatient until delivery and postpartum recovery    Patient seen and staffed with Dr. Carter.     Yashira Eaton MD MPH  OB/GYN, PGY3  Pager: [phone]  11/24/2017    Physician Attestation   I, Charles Carter, saw this patient with the resident and agree with the resident s findings and plan of care as documented in the resident s note.      I personally reviewed vital signs.    Key findings: pPROM    Charles Carter  Date of Service (when I saw the patient): 11/24/17    Time Spent on this Encounter   I, Charles Carter, spent a total of 15 minutes bedside and on the inpatient unit today managing the care of Naheed Crouch.  Over 50% of my time on the unit was spent counseling the patient and /or coordinating care regarding pPROM. See note for details.

## 2017-11-24 NOTE — PROGRESS NOTES
[facility]  MATERNAL CHILD HEALTH   SOCIAL WORK PROGRESS NOTE      Checked in with Naheed in antepartum room. She reports feeling more settled and less overwhelmed. Her son and parents are visiting today, which she is looking forward to. Her partner was bedside. She mailed out her paperwork requesting unemployment and is hopeful to be approved. She does not anticipate needing additional financial resources if she is approved. She reported no concerns with her mood. She declined antepartum support group. Social work will continue to assess needs and provide ongoing psychosocial support and access to resources.     HARRISON Tang, [facility]   Social Worker  Maternal Child Health   Phone: [phone]  Pager: [phone]

## 2017-11-25 PROCEDURE — 25000132 ZZH RX MED GY IP 250 OP 250 PS 637: Performed by: OBSTETRICS & GYNECOLOGY

## 2017-11-25 PROCEDURE — 12000028 ZZH R&B OB UMMC

## 2017-11-25 RX ADMIN — PRENATAL VIT W/ FE FUMARATE-FA TAB 27-0.8 MG 1 TABLET: 27-0.8 TAB at 10:12

## 2017-11-25 RX ADMIN — SENNOSIDES AND DOCUSATE SODIUM 1 TABLET: 8.6; 5 TABLET ORAL at 20:18

## 2017-11-25 RX ADMIN — NICOTINE 1 PATCH: 14 PATCH, EXTENDED RELEASE TRANSDERMAL at 22:02

## 2017-11-25 RX ADMIN — Medication 1 CAPSULE: at 06:54

## 2017-11-25 NOTE — PROGRESS NOTES
[facility] Antepartum Progress Note    Subjective:   Patient doing well. No concerns.  Denies abdominal pain, cramping or contractions, vaginal bleeding or spotting, fevers or chills.       Objective:  Vitals:    17 1953 17 2335 17 0545 17 0903   BP: 115/66 119/57 117/68 117/78   Resp:  16 16   Temp: 98.7  F (37.1  C) 98.4  F (36.9  C) 98  F (36.7  C) 98.1  F (36.7  C)   TempSrc: Oral Oral Oral Oral   Weight:       Height:           Gen: Resting comfortably, lying in bed  CV: Regular rate  Resp: Normal respiratory effort   Abd: Gravid, non-tender, non-distended  Ext: warm, well-perfused, trace edema    FHT: Baseline 130s bpm, moderate variability, no accels, 1x annabelle. decels present  [place]: no contractions or irritability    Imaging:  See imaging tab for report of U/S from     Assessment/Plan:   Naheed Crouch is a 27 year old  @ 28w0d by LMP c/w 7w5d U/S, admitted as transfer of care from [place] for PPROM at 26w5d. Pregnancy is complicated by history of LEEP, tobacco use, obesity.     (1)  PPROM at 26w5d - Will proceed toward delivery if concerns arise regarding fetal status, labor, or infection. No concerns at this time.                     - PPROM work-up negative other than positive Amnisure: UA and UC, wet prep, GC/Chlam, UDS.  - GBS positive at OSH   - s/p latency antibiotics  - Weekly limited U/S Mon/Thur -  BPP       (2) FWB:   - FHT reassuring, TID monitoring  - S/p BMZ (17-17)   - Plan on IV magnesium for neuroprotection if delivery is imminent before 32 weeks.   - S/p NICU consult  - Growth ultrasound last : EFW 38% (897g), MAUREEN 12.9cm, breech; next in 3-4 weeks.   - Limited OB U/S Mon/Thur - last       (3) Tobacco use: on nicotine patch PRN    (4) PNC: A positive, Rubella immune, HIV neg, Hep B sAg neg. GCT 90.   - SW consult  - placenta anterior fundal  - s/p Flu, yet to receive Tdap  - daily Prenatal vitamin      5) Mode of delivery:  -  Expectant management until 34w or indication for delivery including intra-amniotic infection, abruption, fetal indications, labor.  -Breech on last U/S 11/17, would need CS at time of delivery  - If requires CS, patient desires BTL at time of CS, Private insurance.  Will rediscuss at time of delivery.     6) Sciatica/left lower back pain: s/p PT consult with daily exercises given from PT.       7) Dispo:  - Inpatient until delivery and postpartum recovery    Patient seen and staffed with Dr. Carter.     Yashira Eaton MD MPH  OB/GYN, PGY3  Pager: [phone]  11/25/2017

## 2017-11-25 NOTE — PLAN OF CARE
Problem: PROM, PPROM, Prolonged Rupture of Membranes (Adult,Obstetrics,Pediatric)  Goal: Signs and Symptoms of Listed Potential Problems Will be Absent, Minimized or Managed (PROM, PPROM, Prolonged Rupture of Membranes)  Signs and symptoms of listed potential problems will be absent, minimized or managed by discharge/transition of care (reference PROM, PPROM, Prolonged Rupture of Membranes (Adult,Obstetrics,Pediatric) CPG).   Outcome: Improving  Pt stable, VS WDL. Pt denies pain, bleeding, contractions. States leaking clear fluid - slight increase in amount of fluid overnight. FHR appropriate for gestational age.

## 2017-11-25 NOTE — PLAN OF CARE
Problem: Patient Care Overview  Goal: Plan of Care/Patient Progress Review  Outcome: No Change  Maternal status is stable.  VS WNL.  Patient denies pain, cramping, or vaginal bleeding.  States she is leaking scant clear fluid.  FHR AGA, moderate variability, accels present, one VD.

## 2017-11-25 NOTE — PLAN OF CARE
Problem: Patient Care Overview  Goal: Individualization & Mutuality  Outcome: No Change  VSS. Afebrile. Denies bleeding, cramping, ctx, chills, and uterine tenderness. Fetal monitoring AGA and fetus active. Continue with current plan of care.

## 2017-11-26 PROCEDURE — 25000132 ZZH RX MED GY IP 250 OP 250 PS 637: Performed by: OBSTETRICS & GYNECOLOGY

## 2017-11-26 PROCEDURE — 12000028 ZZH R&B OB UMMC

## 2017-11-26 RX ADMIN — NICOTINE 1 PATCH: 14 PATCH, EXTENDED RELEASE TRANSDERMAL at 23:31

## 2017-11-26 RX ADMIN — Medication 1 CAPSULE: at 05:56

## 2017-11-26 RX ADMIN — PRENATAL VIT W/ FE FUMARATE-FA TAB 27-0.8 MG 1 TABLET: 27-0.8 TAB at 09:13

## 2017-11-26 RX ADMIN — SENNOSIDES AND DOCUSATE SODIUM 1 TABLET: 8.6; 5 TABLET ORAL at 20:56

## 2017-11-26 NOTE — PLAN OF CARE
Problem: Patient Care Overview  Goal: Individualization & Mutuality  Outcome: No Change  Afebrile. VSS. Continues to leak scant amounts of clear fluid. Denies cramping, ctx, bleeding, chills and uterine tenderness. FHR decel seen and heard by pt and writer during monitoring, pt became tearful once monitoring finished explaining her frustration with not knowing when the baby will come, fear of what to expect when the baby comes, and feeling scared when a decel happens during monitoring. Pt encouraged to verbalize feelings and also encouraged to attend group on Fridays as a resource for support. Pt also offered the option of a NICU tour, declines at this time, however, may reconsider in the future. Continue with current plan of care.

## 2017-11-26 NOTE — PROGRESS NOTES
[facility] Antepartum Progress Note    Subjective:   Patient doing well. No concerns.  Denies abdominal pain, cramping or contractions, vaginal bleeding or spotting, fevers or chills.     Objective:  Vitals:    17 0600 17 0656 17 0904 17 1233   BP: 116/67  112/62    Pulse:   83    Resp:       Temp:  97.7  F (36.5  C) 97.8  F (36.6  C) 98.4  F (36.9  C)   TempSrc:  Oral Oral Oral   Weight:       Height:           Gen: Resting comfortably, lying in bed  CV: Regular rate  Resp: Normal respiratory effort   Abd: Gravid, non-tender, non-distended  Ext: warm, well-perfused, trace edema    FHT: Baseline 130s, moderate variability, no accels, intermittent variables present  [place]: no contractions or irritability    Imaging:  See imaging tab for report of U/S from     Assessment/Plan:   Naheed Crouch is a 27 year old  @ 28w1d by LMP c/w 7w5d U/S, admitted as transfer of care from [place] for PPROM at 26w5d. Pregnancy is complicated by history of LEEP, tobacco use, obesity. Doing well with no s/s of intrauterine infection or labor at this time.     (1)  PPROM at 26w5d   - Expectant management until 34w, delivery indicated for fetal/maternal status, labor, or infection.   - Infectious workup negative; currently VSS, afebrile, WBC 8.6   - s/p latency antibiotics    (2) FWB:   - FHT reassuring, continue TID monitoring and prn with fetal/maternal indications  - S/p BMZ (17-17)   - IV magnesium indicated for neuroprotection if delivery is imminent before 32 weeks.   - S/p NICU consult  - Growth ultrasound last : EFW 38% (897g), MAUREEN 12.9cm, breech; repeat q3-4 weeks  - Continue twice weekly Limited OB U/S Mon/Thur - last /8 with MAUREEN 7.5, cephalic    (3) Tobacco use:  - Continue nicotine patch PRN    (4) PNC:   - Rh positive - no rhogam indicated; Rubella immune - MMR indicated; HIV NR, Hep B sAg NR. GCT 90.   - SW following  - s/p Flu Tdap  - Continue daily PNV      5)  Mode of delivery:  - Expectant management until 34w or indication for delivery including intra-amniotic infection, abruption, fetal indications, labor.  - Cephalic on last US, candidate for   - If requires CS, patient desires BTL at time of CS, Private insurance. S/p consent for BTL and  upon admission.    6) Sciatica/left lower back pain:   - Appreciate PT recommendations      7) Dispo:  - Inpatient until delivery and postpartum recovery    Patient seen and staffed with Dr. Carter.     Criselda Azul MD  OB GYN PGY-3

## 2017-11-26 NOTE — PROVIDER NOTIFICATION
11/26/17 1410   Comments   Comments 80 sec decel norbert 50s, monitored approx 30 min after decel, mod annabelle + accels MD gold

## 2017-11-26 NOTE — PLAN OF CARE
Problem: PROM, PPROM, Prolonged Rupture of Membranes (Adult,Obstetrics,Pediatric)  Goal: Signs and Symptoms of Listed Potential Problems Will be Absent, Minimized or Managed (PROM, PPROM, Prolonged Rupture of Membranes)  Signs and symptoms of listed potential problems will be absent, minimized or managed by discharge/transition of care (reference PROM, PPROM, Prolonged Rupture of Membranes (Adult,Obstetrics,Pediatric) CPG).   Outcome: Improving  Pt stable, VS WDL. Pt denies bleeding, priyanka, pain. Leaking scant amount clear fluid. Pt states able to sleep well throughout night. FHR appropriate for gestational age.

## 2017-11-26 NOTE — PROVIDER NOTIFICATION
11/25/17 2005   Provider Notification   Provider Name/Title Dr. Eaton   Method of Notification In Department   Notification Reason Decels     Provider notified of FHR decel into 60s, pt left on monitor for additional 30 minutes with no recurrence and reassuring tracing. Per provider, pt okay to be off monitor.

## 2017-11-26 NOTE — PLAN OF CARE
Problem: PROM, PPROM, Prolonged Rupture of Membranes (Adult,Obstetrics,Pediatric)  Goal: Signs and Symptoms of Listed Potential Problems Will be Absent, Minimized or Managed (PROM, PPROM, Prolonged Rupture of Membranes)  Signs and symptoms of listed potential problems will be absent, minimized or managed by discharge/transition of care (reference PROM, PPROM, Prolonged Rupture of Membranes (Adult,Obstetrics,Pediatric) CPG).   Pt reports cont to leak clear fluid. Denies ctx, bleeding.  here. Family and son to come later today. NO needs at this time, will call with questions or needs. Cont AP cares.

## 2017-11-27 ENCOUNTER — HOSPITAL ENCOUNTER (INPATIENT)
Dept: ULTRASOUND IMAGING | Facility: CLINIC | Age: 27
End: 2017-11-27
Attending: OBSTETRICS & GYNECOLOGY
Payer: COMMERCIAL

## 2017-11-27 PROCEDURE — 25000132 ZZH RX MED GY IP 250 OP 250 PS 637: Performed by: OBSTETRICS & GYNECOLOGY

## 2017-11-27 PROCEDURE — 12000028 ZZH R&B OB UMMC

## 2017-11-27 PROCEDURE — 76819 FETAL BIOPHYS PROFIL W/O NST: CPT

## 2017-11-27 RX ADMIN — NICOTINE 1 PATCH: 7 PATCH TRANSDERMAL at 21:50

## 2017-11-27 RX ADMIN — Medication 1 CAPSULE: at 06:48

## 2017-11-27 RX ADMIN — SENNOSIDES AND DOCUSATE SODIUM 1 TABLET: 8.6; 5 TABLET ORAL at 20:08

## 2017-11-27 RX ADMIN — PRENATAL VIT W/ FE FUMARATE-FA TAB 27-0.8 MG 1 TABLET: 27-0.8 TAB at 08:39

## 2017-11-27 NOTE — PLAN OF CARE
Problem: PROM, PPROM, Prolonged Rupture of Membranes (Adult,Obstetrics,Pediatric)  Goal: Signs and Symptoms of Listed Potential Problems Will be Absent, Minimized or Managed (PROM, PPROM, Prolonged Rupture of Membranes)  Signs and symptoms of listed potential problems will be absent, minimized or managed by discharge/transition of care (reference PROM, PPROM, Prolonged Rupture of Membranes (Adult,Obstetrics,Pediatric) CPG).   Outcome: No Change  No change in color, amount of fluid leaking. Doing well, reports good support from FOB and he is keeping her sane while hospitalized. Nicotine patch working well for her and MD discuss reducing dose of same and pt amenable and desires to quit smoking. Discuss plans for daily meds, plan for day of amb off unit, monitoring, calling with needs.  Cont AP cares.

## 2017-11-27 NOTE — PLAN OF CARE
Problem: PROM, PPROM, Prolonged Rupture of Membranes (Adult,Obstetrics,Pediatric)  Goal: Signs and Symptoms of Listed Potential Problems Will be Absent, Minimized or Managed (PROM, PPROM, Prolonged Rupture of Membranes)  Signs and symptoms of listed potential problems will be absent, minimized or managed by discharge/transition of care (reference PROM, PPROM, Prolonged Rupture of Membranes (Adult,Obstetrics,Pediatric) CPG).   Outcome: Improving  Pt stable, VS WDL. Pt denies: pain, bleeding, contractions. Leaking scant amount clear fluid. FHR appropriate for gestational age.

## 2017-11-27 NOTE — PROGRESS NOTES
[facility] Antepartum Progress Note    Subjective:   Patient doing well. No concerns. Denies abdominal pain, cramping or contractions, vaginal bleeding or spotting, fevers or chills. Feeling really great about tobacco cessation and would like to step down a patch size. Feels well supported by her partner, who stays here every day with her.     Objective:  Vitals:    17 1950 17 2330 17 0540 17 0836   BP: 111/61 108/56 112/59 118/63   Pulse:       Resp:     Temp: 98.6  F (37  C) 98.1  F (36.7  C) 97.7  F (36.5  C) 97.5  F (36.4  C)   TempSrc: Oral Oral Oral Oral   Weight:       Height:       HR 83    Gen: Resting comfortably, lying in bed  CV: Regular rate  Resp: Normal respiratory effort   Abd: Gravid, non-tender, non-distended  Ext: warm, well-perfused, trace edema    FHT: Baseline 140s, moderate variability, no accels, intermittent variables present  [place]: no contractions or irritability    Imaging:  See imaging tab for report of U/S from 2017 - MAUREEN 9.1, BPP 8/8, Cephalic    Assessment/Plan:   Naheed Crouch is a 27 year old  @ 28w2d by LMP c/w 7w5d U/S, admitted as transfer of care from [place] for PPROM at 26w5d. Pregnancy is complicated by history of LEEP, tobacco use, obesity. Doing well with no s/s of intrauterine infection or labor at this time.     (1)  PPROM at 26w5d   - Expectant management until 34w, delivery indicated for fetal/maternal status, labor, or infection.   - Infectious workup negative; currently VSS, afebrile, WBC 8.6   - s/p latency antibiotics     (2) FWB:   - FHT reassuring, continue TID monitoring and prn with fetal/maternal indications  - S/p BMZ (17-17)   - IV magnesium indicated for neuroprotection if delivery is imminent before 32 weeks.   - S/p NICU consult  - Growth ultrasound last : EFW 38% (897g), MAUREEN 12.9cm, breech; repeat q3-4 weeks  - Continue twice weekly Limited OB U/S Mon/Thur - last  BPP  with MAUREEN 9.1,  cephalic    (3) Tobacco use:  - Continue nicotine patch, 14 > 7mg transition today    (4) PNC:   - Rh positive - no rhogam indicated; Rubella immune - no MMR indicated; HIV NR, Hep B sAg NR. GCT 90.   - SW following  - s/p Flu and Tdap  - Continue daily PNV      5) Mode of delivery:  - Expectant management until 34w or indication for delivery including intra-amniotic infection, abruption, fetal indications, labor.  - Cephalic on last US, candidate for  if cephalic and reassuring maternal/fetal status  - If requires CS, patient desires BTL at time of CS, Private insurance. S/p consent for BTL and  upon admission.    6) Sciatica/left lower back pain:   - Appreciate PT recommendations      7) Dispo:  - Inpatient until delivery and postpartum recovery    Patient seen and staffed with Dr. Gomez.     Criselda Azul MD  OB GYN PGY-3

## 2017-11-28 PROCEDURE — 25000132 ZZH RX MED GY IP 250 OP 250 PS 637: Performed by: OBSTETRICS & GYNECOLOGY

## 2017-11-28 PROCEDURE — 12000028 ZZH R&B OB UMMC

## 2017-11-28 RX ADMIN — PRENATAL VIT W/ FE FUMARATE-FA TAB 27-0.8 MG 1 TABLET: 27-0.8 TAB at 09:01

## 2017-11-28 RX ADMIN — Medication 1 CAPSULE: at 06:10

## 2017-11-28 RX ADMIN — NICOTINE 1 PATCH: 7 PATCH TRANSDERMAL at 21:53

## 2017-11-28 RX ADMIN — SENNOSIDES AND DOCUSATE SODIUM 1 TABLET: 8.6; 5 TABLET ORAL at 20:32

## 2017-11-28 NOTE — PLAN OF CARE
Problem: PROM, PPROM, Prolonged Rupture of Membranes (Adult,Obstetrics,Pediatric)  Goal: Signs and Symptoms of Listed Potential Problems Will be Absent, Minimized or Managed (PROM, PPROM, Prolonged Rupture of Membranes)  Signs and symptoms of listed potential problems will be absent, minimized or managed by discharge/transition of care (reference PROM, PPROM, Prolonged Rupture of Membranes (Adult,Obstetrics,Pediatric) CPG).   Outcome: No Change  Data: Afebrile. Leaking small amounts of clear/yellow fluid. Baseline per pt report. Fluid color has not changed.  Contraction pattern stable and within parameters. Fetal assessment Appropriate for Gestational Age. Signs and symptoms of infection are not present.  Interventions: Monitor vital signs and indicators of infection every 4 hours while awake. Continue uterine/fetal assessment as ordered. Activity level: Regular activity. Preventive measures include Medications, Positioning and Frequent voiding. Encourage active range of motion and frequent position changes.  Plan: Continue expectant management. Observe for and notify care provider of indicators of progressing labor, signs/symptoms of infection, or fetal/maternal compromise.

## 2017-11-28 NOTE — PLAN OF CARE
Problem: PROM, PPROM, Prolonged Rupture of Membranes (Adult,Obstetrics,Pediatric)  Goal: Signs and Symptoms of Listed Potential Problems Will be Absent, Minimized or Managed (PROM, PPROM, Prolonged Rupture of Membranes)  Signs and symptoms of listed potential problems will be absent, minimized or managed by discharge/transition of care (reference PROM, PPROM, Prolonged Rupture of Membranes (Adult,Obstetrics,Pediatric) CPG).   Outcome: Therapy, progress toward functional goals as expected  Pt offers no complaints today.  She is afebrile and denies contractions or other symptoms of pre term labor.   Her father of baby is at her bed side day and night.  Pt ambulation.  Will continue with expectant care.

## 2017-11-28 NOTE — PLAN OF CARE
Problem: PROM, PPROM, Prolonged Rupture of Membranes (Adult,Obstetrics,Pediatric)  Goal: Signs and Symptoms of Listed Potential Problems Will be Absent, Minimized or Managed (PROM, PPROM, Prolonged Rupture of Membranes)  Signs and symptoms of listed potential problems will be absent, minimized or managed by discharge/transition of care (reference PROM, PPROM, Prolonged Rupture of Membranes (Adult,Obstetrics,Pediatric) CPG).   Outcome: No Change  Data: Afebrile. Leaking scant  amounts of clear fluid. Contraction pattern stable and within parameters. Fetal assessment Reactive. Signs and symptoms of infection absent.  Interventions: Monitor vital signs and indicators of infection every 4 hours while awake. Continue uterine/fetal assessment every shift. Activity level: Regular activity. Taking walks with significant other during the day.  Plan: Continue expectant management. Observe for and notify care provider of indicators of progressing labor, signs/symptoms of infection, or fetal/maternal compromise.

## 2017-11-28 NOTE — PROGRESS NOTES
MFM Daily Note    S: No c/o of change in discharge, ctx, abdominal pain/ctx or VB. Good FM. Feels that lower nicotine patch is working.    O:  Patient Vitals for the past 24 hrs:   BP Temp Temp src Resp Weight   17 1320 117/62 98.1  F (36.7  C) Oral 16 -   17 0905 113/73 98  F (36.7  C) Oral 16 -   17 0600 - 98.4  F (36.9  C) Oral 16 111 kg (244 lb 11.2 oz)   17 2315 108/57 97.9  F (36.6  C) Oral 16 -   17 130/73 98.5  F (36.9  C) Oral - -   17 1613 104/58 98.6  F (37  C) Oral 16 -     Abd - NT  Ext - NT  FHT - 140, mod annabelle, accels, no decels  [place] - no ctx    Assessment/Plan:   Naheed Crouch is a 27 year old  @ 28w3d by LMP c/w 7w5d U/S, admitted as transfer of care from [place] for PPROM at 26w5d. Pregnancy is complicated by history of LEEP, tobacco use, obesity. Doing well with no s/s of intrauterine infection or labor at this time.      (1)  PPROM at 26w5d   - Expectant management until 34w, delivery indicated for fetal/maternal status, labor, or infection.   - Infectious workup negative; currently VSS, afebrile, WBC 8.6   - s/p latency antibiotics      (2) FWB:   - FHT reassuring, continue TID monitoring and prn with fetal/maternal indications  - S/p BMZ (17-17)   - IV magnesium indicated for neuroprotection if delivery is imminent before 32 weeks.   - S/p NICU consult  - Growth ultrasound last : EFW 38% (897g), MAUREEN 12.9cm, breech; repeat q3 weeks  - Continue twice BPP Mon/Thur - last  BPP 8/8 with MAUREEN 9.1, cephalic     (3) Tobacco use:  - Continue nicotine patch 7 mg     (4) PNC:   - Rh positive - no rhogam indicated; Rubella immune - no MMR indicated; HIV NR, Hep B sAg NR. GCT 90.   - SW following  - s/p Flu and Tdap  - Continue daily PNV      5) Mode of delivery:  - Expectant management until 34w or indication for delivery including intra-amniotic infection, abruption, fetal indications, labor.  - Cephalic on last US, candidate  for  if cephalic and reassuring maternal/fetal status  - If requires CS, patient desires BTL at time of CS, Private insurance. S/p consent for BTL and  upon admission.     6) Sciatica/left lower back pain:   - Appreciate PT recommendations      7) Dispo:  - Inpatient until delivery and postpartum recovery    Gonzalo Camilo MD  Maternal-Fetal Medicine

## 2017-11-28 NOTE — PLAN OF CARE
Problem: Patient Care Overview  Goal: Plan of Care/Patient Progress Review  Outcome: No Change  Stable. Continue present cares.

## 2017-11-29 PROCEDURE — 25000132 ZZH RX MED GY IP 250 OP 250 PS 637: Performed by: OBSTETRICS & GYNECOLOGY

## 2017-11-29 PROCEDURE — 12000028 ZZH R&B OB UMMC

## 2017-11-29 RX ADMIN — SENNOSIDES AND DOCUSATE SODIUM 1 TABLET: 8.6; 5 TABLET ORAL at 20:26

## 2017-11-29 RX ADMIN — Medication 1 CAPSULE: at 06:12

## 2017-11-29 RX ADMIN — NICOTINE 1 PATCH: 7 PATCH TRANSDERMAL at 22:00

## 2017-11-29 RX ADMIN — PRENATAL VIT W/ FE FUMARATE-FA TAB 27-0.8 MG 1 TABLET: 27-0.8 TAB at 07:54

## 2017-11-29 NOTE — PLAN OF CARE
Problem: PROM, PPROM, Prolonged Rupture of Membranes (Adult,Obstetrics,Pediatric)  Goal: Signs and Symptoms of Listed Potential Problems Will be Absent, Minimized or Managed (PROM, PPROM, Prolonged Rupture of Membranes)  Signs and symptoms of listed potential problems will be absent, minimized or managed by discharge/transition of care (reference PROM, PPROM, Prolonged Rupture of Membranes (Adult,Obstetrics,Pediatric) CPG).   Outcome: No Change  Pt had a quiet day. Took a nap most of the morning. Continues to leak scant amounts of clear fluid. Afebrile. No contractions or bleeding. Continue to closely monitor.

## 2017-11-29 NOTE — PROVIDER NOTIFICATION
11/29/17 0610   FHR   Monitor External US   Variability 6-25 BPM   Baseline Rate (Fetus A) 135 bpm   Baseline Classification Normal   Accelerations Present   Decelerations VD     Provider states ok to take off monitor.

## 2017-11-29 NOTE — PROVIDER NOTIFICATION
11/29/17 0620   Uterine Activity   Monitor [place]   Contraction Frequency (minutes) applied   Contraction Quality Not feeling contraction   Resting Tone Palpated Soft   FHR   Monitor External US   Baseline Rate (Fetus A) 135 bpm     Provider called back and wanted pt placed on EFM/TOCO for another 20 minutes.

## 2017-11-29 NOTE — PROGRESS NOTES
MFM Daily Note    S: Patient denied any issues this morning. Reports good fetal movement. No contractions, vaginal discharge, vaginal bleeding, abdominal pain, fevers or chills.     O:  Patient Vitals for the past 24 hrs:   BP Temp Temp src Resp   17 0443 107/67 97.5  F (36.4  C) Oral -   17 2353 111/56 98.2  F (36.8  C) Oral -   17 1930 117/61 97.8  F (36.6  C) Oral 18   17 1540 121/72 97.9  F (36.6  C) Oral 18   17 1320 117/62 98.1  F (36.7  C) Oral 16   17 0905 113/73 98  F (36.7  C) Oral 16   HR 53-73  Gen: NAD, A/O  CV: RR  Pulm: No increased WOB  Abd: Gravid, nontender  Ext: non-edematous, nontender    FHT: 150s, mod annabelle, accels present 10x10, isolated spontaneous decels overnight  [place]: No contractions    Assessment/Plan:   Naheed Crouch is a 27 year old  at 28w4d by LMP c/w 7w5d U/S, admitted as transfer of care from [place] for PPROM at 26w5d. Pregnancy is complicated by history of LEEP, tobacco use, obesity. Doing well with no s/s of intrauterine infection or labor at this time.      (1)  PPROM at 26w5d   - Expectant management until 34w, delivery indicated for fetal/maternal status, labor, or infection.   - Infectious workup negative; currently VSS, afebrile, WBC 8.6   - s/p latency antibiotics      (2) FWB:   - FHT reassuring and appropriate for gestational age, continue TID monitoring and prn with fetal/maternal indications  - S/p BMZ (17-17), rescue course as clinically indicated  - IV magnesium indicated for neuroprotection if delivery is imminent before 32 weeks.   - S/p NICU consult  - Growth ultrasound last : EFW 38% (897g), MAUREEN 12.9cm, breech; repeat q3 weeks  - Continue twice BPP Mon/Thur - last  BPP  with MAUREEN 9.1, cephalic     (3) Tobacco use:  - Continue nicotine patch 7 mg, taper down in ~1-2 weeks     (4) PNC:   - Rh positive - no rhogam indicated; Rubella immune - no MMR indicated; HIV NR, GC/Chlam NR, RPR NR,  Hep B sAg NR. GCT 90.   - SW following  - s/p Flu and Tdap  - Continue daily PNV      5) Mode of delivery:  - Expectant management until 34w or indication for delivery including intra-amniotic infection, abruption, fetal indications, labor.  - Cephalic on last US, candidate for  if cephalic and reassuring maternal/fetal status  - If requires CS, patient desires BTL at time of CS, Private insurance. S/p consent for BTL and  upon admission.     6) Sciatica/left lower back pain:   - Appreciate PT recommendations      7) Dispo:  - Inpatient until delivery and postpartum recovery    Criselda Azul MD  OB GYN PGY-3  2017  8:51 AM

## 2017-11-29 NOTE — PLAN OF CARE
"Problem: PROM, PPROM, Prolonged Rupture of Membranes (Adult,Obstetrics,Pediatric)  Goal: Signs and Symptoms of Listed Potential Problems Will be Absent, Minimized or Managed (PROM, PPROM, Prolonged Rupture of Membranes)  Signs and symptoms of listed potential problems will be absent, minimized or managed by discharge/transition of care (reference PROM, PPROM, Prolonged Rupture of Membranes (Adult,Obstetrics,Pediatric) CPG).   Outcome: No Change  Data:  VSS. /67  Pulse 72  Temp 97.5  F (36.4  C) (Oral)  Resp 18  Ht 1.727 m (5' 8\")  Wt 111 kg (244 lb 11.2 oz)  LMP  (LMP Unknown)  BMI 37.21 kg/m2. Afebrile. Leaking moderate amounts of clear fluid. Contraction pattern stable and within parameters. Fetal assessment Appropriate for Gestational Age. Signs and symptoms of infection are not present.  Interventions: Monitor vital signs and indicators of infection every 4 hours while awake. Continue uterine/fetal assessment as ordered. Activity level: Regular activity. Preventive measures include Medications, Positioning and Frequent voiding. Encourage active range of motion and frequent position changes.  Plan: Continue expectant management. Observe for and notify care provider of indicators of progressing labor, signs/symptoms of infection, or fetal/maternal compromise.      "

## 2017-11-29 NOTE — PROVIDER NOTIFICATION
11/29/17 0550   FHR   Monitor External US   Variability 6-25 BPM   Baseline Rate (Fetus A) 130 bpm   Baseline Classification Normal   Accelerations Present   Decelerations VD     MD notified of VD. Per MD ok to take off.

## 2017-11-29 NOTE — PLAN OF CARE
Problem: Patient Care Overview  Goal: Plan of Care/Patient Progress Review  Outcome: No Change  Patient stable. No s/s of infection. No contractions. FHR AGA. SO at bedside. Continue with plan of care.

## 2017-11-30 ENCOUNTER — APPOINTMENT (OUTPATIENT)
Dept: PHYSICAL THERAPY | Facility: CLINIC | Age: 27
End: 2017-11-30
Payer: COMMERCIAL

## 2017-11-30 ENCOUNTER — HOSPITAL ENCOUNTER (INPATIENT)
Dept: ULTRASOUND IMAGING | Facility: CLINIC | Age: 27
End: 2017-11-30
Attending: OBSTETRICS & GYNECOLOGY
Payer: COMMERCIAL

## 2017-11-30 PROCEDURE — 25000132 ZZH RX MED GY IP 250 OP 250 PS 637: Performed by: OBSTETRICS & GYNECOLOGY

## 2017-11-30 PROCEDURE — 76819 FETAL BIOPHYS PROFIL W/O NST: CPT

## 2017-11-30 PROCEDURE — 40000193 ZZH STATISTIC PT WARD VISIT: Performed by: PHYSICAL THERAPIST

## 2017-11-30 PROCEDURE — 97110 THERAPEUTIC EXERCISES: CPT | Mod: GP | Performed by: PHYSICAL THERAPIST

## 2017-11-30 PROCEDURE — 12000028 ZZH R&B OB UMMC

## 2017-11-30 RX ADMIN — PRENATAL VIT W/ FE FUMARATE-FA TAB 27-0.8 MG 1 TABLET: 27-0.8 TAB at 08:14

## 2017-11-30 RX ADMIN — Medication 1 CAPSULE: at 08:15

## 2017-11-30 RX ADMIN — NICOTINE 1 PATCH: 7 PATCH TRANSDERMAL at 22:03

## 2017-11-30 RX ADMIN — SENNOSIDES AND DOCUSATE SODIUM 1 TABLET: 8.6; 5 TABLET ORAL at 20:23

## 2017-11-30 NOTE — PLAN OF CARE
Problem: PROM, PPROM, Prolonged Rupture of Membranes (Adult,Obstetrics,Pediatric)  Goal: Signs and Symptoms of Listed Potential Problems Will be Absent, Minimized or Managed (PROM, PPROM, Prolonged Rupture of Membranes)  Signs and symptoms of listed potential problems will be absent, minimized or managed by discharge/transition of care (reference PROM, PPROM, Prolonged Rupture of Membranes (Adult,Obstetrics,Pediatric) CPG).   Outcome: No Change  Vss. Afebrile. Up to walk without difficulty. Eating and drinking without concern. Denies bleeding, cramping or ctx. +FM per pt. Looking for things to do to keep herself busy. Plan to continue cares.

## 2017-11-30 NOTE — PLAN OF CARE
Problem: Patient Care Overview  Goal: Plan of Care/Patient Progress Review  Outcome: No Change  Patient stable. FHR reactive; 1 audible VD noted while RN at bedside. Fetus very active. Repositioned patient. No further decels. Dr. Story notified, no need to extend monitoring. No contractions. FOB at bedside. Continue with plan of care.

## 2017-11-30 NOTE — PROGRESS NOTES
Maternal Fetal Medicine Daily Note    S: Patient denied any issues this morning. Reports good fetal movement. No contractions, vaginal discharge, vaginal bleeding, abdominal pain, fevers or chills.     O:  Patient Vitals for the past 24 hrs:   BP Temp Temp src Pulse Resp   17 0325 118/69 98.3  F (36.8  C) - - 16   17 2330 100/55 98.2  F (36.8  C) - 74 16   17 2020 116/70 98.1  F (36.7  C) Oral - 16   17 1550 121/72 97.7  F (36.5  C) Oral - 16   17 1400 118/68 97.7  F (36.5  C) Oral - 18   17 1105 115/62 98  F (36.7  C) Oral - 18     Gen: NAD, A/O  CV: RR  Pulm: No increased WOB  Abd: Gravid, nontender  Ext: non-edematous, nontender    FHT: , mod annabelle, accels present, decels absent  [place]: No contractions    Imaging: Please see imaging tab for a copy of today's ultrasound: MAUREEN 4.8cm, no 2x2 pocket, BPP 6/8 for MAUREEN, cephalic    Assessment/Plan:   Naheed Crouch is a 27 year old  at 28w5d by LMP c/w 7w5d U/S, admitted as transfer of care from [place] for PPROM at 26w5d. Pregnancy is complicated by history of LEEP, tobacco use, obesity. Doing well with no s/s of intrauterine infection or labor at this time.      (1)  PPROM at 26w5d   - Expectant management until 34w, delivery indicated for fetal/maternal status, labor, or infection.   - Infectious workup negative; currently VSS, afebrile, WBC 8.6   - s/p latency antibiotics      (2) FWB:   - FHT reassuring and appropriate for gestational age, continue TID monitoring and prn with fetal/maternal indications  - S/p BMZ (17-17), rescue course as clinically indicated  - IV magnesium indicated for neuroprotection if delivery is imminent before 32 weeks.   - S/p NICU consult  - Growth ultrasound last : EFW 38% (897g), MAUREEN 12.9cm, breech; repeat q3 weeks  - Continue twice BPP Mon/Thur - last  BPP 6/8 with MAUREEN 4.8, cephalic     (3) Tobacco use:  - Continue nicotine patch 7 mg, taper down in ~1-2  weeks     (4) PNC:   - Rh positive - no rhogam indicated; Rubella immune - no MMR indicated; HIV NR, GC/Chlam NR, RPR NR, Hep B sAg NR. GCT 90.   - SW following  - s/p Flu and Tdap  - Continue daily PNV      5) Mode of delivery:  - Expectant management until 34w or indication for delivery including intra-amniotic infection, abruption, fetal indications, labor.  - Cephalic on last US, candidate for  if cephalic and reassuring maternal/fetal status  - If requires CS, patient desires BTL at time of CS, Private insurance. S/p consent for BTL and  upon admission.      6) Dispo:  - Inpatient until delivery and postpartum recovery    Criselda Azul MD  OB GYN PGY-3  2017  7:56 AM    Patient understands and agrees with plan of care, all questions answered.   The patient was seen and discussed with Dr. Camilo who is in agreement with the treatment plan.

## 2017-11-30 NOTE — PLAN OF CARE
Problem: Patient Care Overview  Goal: Plan of Care/Patient Progress Review  PT: pt had fetal monitoring during scheduled PT session.  She states the buttock pain is intermittent with good days and bad days and has been doing some of the exercise program but some are too hard.  Added a couple more exercises/stretches.  Will recheck in 4+ days and likely discharge if pt is compliant with program and/or getting more relief.

## 2017-11-30 NOTE — PLAN OF CARE
Problem: PROM, PPROM, Prolonged Rupture of Membranes (Adult,Obstetrics,Pediatric)  Goal: Signs and Symptoms of Listed Potential Problems Will be Absent, Minimized or Managed (PROM, PPROM, Prolonged Rupture of Membranes)  Signs and symptoms of listed potential problems will be absent, minimized or managed by discharge/transition of care (reference PROM, PPROM, Prolonged Rupture of Membranes (Adult,Obstetrics,Pediatric) CPG).   Outcome: No Change  Afebrile, VSS. Pt denies ctx, bldg, pain. Continues to leak small amt of clear fluid. FHR AGA w/mod variability, +acels, VD x1. Slept well overnight, no complaints. Continue POC, contact MD w/questions/concerns.

## 2017-12-01 PROCEDURE — 12000028 ZZH R&B OB UMMC

## 2017-12-01 PROCEDURE — 25000132 ZZH RX MED GY IP 250 OP 250 PS 637: Performed by: OBSTETRICS & GYNECOLOGY

## 2017-12-01 RX ADMIN — PRENATAL VIT W/ FE FUMARATE-FA TAB 27-0.8 MG 1 TABLET: 27-0.8 TAB at 08:34

## 2017-12-01 RX ADMIN — SENNOSIDES AND DOCUSATE SODIUM 1 TABLET: 8.6; 5 TABLET ORAL at 22:15

## 2017-12-01 RX ADMIN — NICOTINE 1 PATCH: 7 PATCH TRANSDERMAL at 22:15

## 2017-12-01 RX ADMIN — Medication 1 CAPSULE: at 08:34

## 2017-12-01 NOTE — PROGRESS NOTES
Maternal Fetal Medicine Daily Note    S: Patient denies any issues this morning. Reports good fetal movement. No contractions, vaginal discharge, vaginal bleeding, abdominal pain, fevers or chills. Patient reassured about MAUREEN of < 5 yesterday.    O:  Patient Vitals for the past 24 hrs:   BP Temp Temp src Resp   17 0203 - 97.6  F (36.4  C) Oral -   17 2207 116/61 - - 16   17 - 97.8  F (36.6  C) Oral -   17 1312 - 98.1  F (36.7  C) - -   17 0814 121/75 97.9  F (36.6  C) Oral 16     Gen: NAD, A/O  Abd: Gravid, nontender    FHT: , mod annabelle, accels present, decels absent  [place]: No contractions    Imaging: Please see imaging tab for a copy of  ultrasound: MAUREEN 4.8cm, no 2x2 pocket, BPP 6/8 for MAUREEN, cephalic    Assessment/Plan:   Naheed Crouch is a 27 year old  at 28w6d by LMP c/w 7w5d U/S, admitted as transfer of care from [place] for PPROM at 26w5d. Pregnancy is complicated by history of LEEP, tobacco use, obesity. Doing well with no s/s of intrauterine infection or labor at this time.      (1)  PPROM at 26w5d   - Expectant management until 34w, delivery indicated for fetal/maternal status, labor, or infection.   - Infectious workup negative; currently VSS, afebrile, WBC 8.6   - s/p latency antibiotics      (2) FWB:   - FHT reassuring and appropriate for gestational age, continue TID monitoring and prn with fetal/maternal indications  - S/p BMZ (17-17), rescue course as clinically indicated  - IV magnesium indicated for neuroprotection if delivery is imminent before 32 weeks.   - S/p NICU consult  - Growth ultrasound last : EFW 38% (897g), MAUREEN 12.9cm, breech; repeat q3 weeks  - Continue twice BPP Mon/Thur - last 11/30 BPP 6/8 with MAUREEN 4.8, cephalic     (3) Tobacco use:  - Continue nicotine patch 7 mg, taper down in ~1-2 weeks     (4) PNC:   - Rh positive - no rhogam indicated; Rubella immune - no MMR indicated; HIV NR, GC/Chlam NR, RPR NR,  Hep B sAg NR. GCT 90.   - SW following  - s/p Flu and Tdap  - Continue daily PNV      5) Mode of delivery:  - Expectant management until 34w or indication for delivery including intra-amniotic infection, abruption, fetal indications, labor.  - Cephalic on last US, candidate for  if cephalic and reassuring maternal/fetal status  - If requires CS, patient desires BTL at time of CS, Private insurance. S/p consent for BTL and  upon admission.      6) Dispo:  - Inpatient until delivery and postpartum recovery    Gonzalo Camilo MD  Maternal-Fetal Medicine

## 2017-12-01 NOTE — PLAN OF CARE
Problem: Patient Care Overview  Goal: Plan of Care/Patient Progress Review  Outcome: No Change  Patient stable and denies ctx, cramping, abdominal tenderness, or vaginal bleeding.  Leaking scant clear fluid.  VSS.  FHR category 1.  No ctx noted on toco monitoring.  Patient up walking around unit today and to lobby for change of scenery.  FOB present and supportive of patient.  Continue to monitor.

## 2017-12-01 NOTE — PLAN OF CARE
Problem: Patient Care Overview  Goal: Plan of Care/Patient Progress Review  Outcome: No Change  Data: Maternal status stable. VSS this shift, pt afebrile. Fetal assessment is appropriate for gestational age (see flowsheet). [place] reveals no contractions, patient denies feeling contractions or pain. Pt reports a slight increase in amount of clear amniotic fluid over night. Pt denies gushing fluid, foul odor to fluid,vaginal bleeding, uterine contractions or abdominal pain/tenderness. Pt reports active fetal movement.   Action: Continue with plan of care, which is light activity, TID monitoring, Q shift vitals, Q4 temperatures. Patient encouraged to move extremities while in bed.  Response: Patient coping well, very pleasant tonight. Will continue to monitor.

## 2017-12-02 PROCEDURE — 25000132 ZZH RX MED GY IP 250 OP 250 PS 637: Performed by: OBSTETRICS & GYNECOLOGY

## 2017-12-02 PROCEDURE — 12000028 ZZH R&B OB UMMC

## 2017-12-02 RX ADMIN — Medication 1 CAPSULE: at 08:16

## 2017-12-02 RX ADMIN — PRENATAL VIT W/ FE FUMARATE-FA TAB 27-0.8 MG 1 TABLET: 27-0.8 TAB at 08:17

## 2017-12-02 RX ADMIN — NICOTINE 1 PATCH: 7 PATCH TRANSDERMAL at 21:54

## 2017-12-02 RX ADMIN — SENNOSIDES AND DOCUSATE SODIUM 1 TABLET: 8.6; 5 TABLET ORAL at 20:19

## 2017-12-02 NOTE — PLAN OF CARE
Problem: PROM, PPROM, Prolonged Rupture of Membranes (Adult,Obstetrics,Pediatric)  Goal: Signs and Symptoms of Listed Potential Problems Will be Absent, Minimized or Managed (PROM, PPROM, Prolonged Rupture of Membranes)  Signs and symptoms of listed potential problems will be absent, minimized or managed by discharge/transition of care (reference PROM, PPROM, Prolonged Rupture of Membranes (Adult,Obstetrics,Pediatric) CPG).   Outcome: No Change  Pt. Denies changes in color, odor of amniotic fluid. Denies abd pain, cramping or pelvic pressure.  She is doing laundry and playing video games.  No complaints at this time will update the nurse with changes.

## 2017-12-02 NOTE — PLAN OF CARE
Problem: Patient Care Overview  Goal: Plan of Care/Patient Progress Review  Outcome: No Change  VSS. Afebrile.  FHT- Category 1.  No contractions.  Denies pain.  Continues to leak scant amount of clear fluid. Changed dressing on IV.  Slept well overnight.

## 2017-12-02 NOTE — PROGRESS NOTES
Maternal Fetal Medicine Daily Note    S: Patient denies any issues this morning. Reports good fetal movement. No contractions, vaginal discharge, vaginal bleeding, abdominal pain, fevers or chills.     O:  Patient Vitals for the past 24 hrs:   BP Temp Temp src   17 0953 - 97.7  F (36.5  C) Oral   17 0606 116/67 97.9  F (36.6  C) Oral   17 0200 - 98.1  F (36.7  C) Oral   17 2236 - 97.9  F (36.6  C) Oral   17 1731 120/76 98.5  F (36.9  C) Oral   17 1221 121/71 97.9  F (36.6  C) Oral     Gen: NAD, A/O  CV: RRR  Lungs: CTAB  Abd: Gravid, nontender  Ext: Negative    FHT: , mod annabelle, accels present, decels absent  [place]: No contractions    Imaging: Please see imaging tab for a copy of  ultrasound: MAUREEN 4.8cm, no 2x2 pocket, BPP 6/8 for MAUREEN, cephalic    Assessment/Plan:   Naheed Crouch is a 27 year old  at 29w0d by LMP c/w 7w5d U/S, admitted as transfer of care from [place] for PPROM at 26w5d. Pregnancy is complicated by history of LEEP, tobacco use, obesity. Doing well with no s/s of intrauterine infection or labor at this time.      (1)  PPROM at 26w5d   - Expectant management until 34w, delivery indicated for fetal/maternal status, labor, or infection.   - Infectious workup negative; currently VSS, afebrile, WBC 8.6   - s/p latency antibiotics      (2) FWB:   - FHT reassuring and appropriate for gestational age, continue TID monitoring and prn with fetal/maternal indications  - S/p BMZ (17-17), rescue course as clinically indicated  - IV magnesium indicated for neuroprotection if delivery is imminent before 32 weeks.   - S/p NICU consult  - Growth ultrasound last : EFW 38% (897g), MAUREEN 12.9cm, breech; repeat q3 weeks  - Continue twice weekly BPP Mon/Thur - last  BPP / with MAUREEN 4.8, cephalic     (3) Tobacco use:  - Continue nicotine patch 7 mg, taper down in ~1-2 weeks     (4) PNC:   - Rh positive - no rhogam indicated; Rubella immune -  no MMR indicated; GBS positive, HIV NR, GC/Chlam NR, RPR NR, Hep B sAg NR. GCT 90.   - SW following  - s/p Flu and Tdap  - Continue daily PNV      5) Mode of delivery:  - Expectant management until 34w or indication for delivery including intra-amniotic infection, abruption, fetal indications, labor.  - Cephalic on last US, candidate for  if cephalic and reassuring maternal/fetal status  - If requires CS, patient desires BTL at time of CS, Private insurance. S/p consent for BTL and  upon admission.      6) Dispo:  - Inpatient until delivery and postpartum recovery    Aaliyah Bryant MD  Specialist in Maternal-Fetal Medicine

## 2017-12-02 NOTE — PLAN OF CARE
VSS. Patient denies ctx, cramping, abdominal tenderness, or vaginal bleeding.  Leaking scant clear fluid.  FHR category 1.  No ctx noted on toco monitoring.  Pt cheerful in mood. Walked to lobby to to get pizza.  FOB present and supportive of patient.  Continue to monitor.

## 2017-12-03 PROCEDURE — 12000028 ZZH R&B OB UMMC

## 2017-12-03 PROCEDURE — 25000132 ZZH RX MED GY IP 250 OP 250 PS 637: Performed by: OBSTETRICS & GYNECOLOGY

## 2017-12-03 RX ADMIN — Medication 1 CAPSULE: at 07:45

## 2017-12-03 RX ADMIN — PRENATAL VIT W/ FE FUMARATE-FA TAB 27-0.8 MG 1 TABLET: 27-0.8 TAB at 07:45

## 2017-12-03 RX ADMIN — SENNOSIDES AND DOCUSATE SODIUM 1 TABLET: 8.6; 5 TABLET ORAL at 20:19

## 2017-12-03 RX ADMIN — NICOTINE 1 PATCH: 7 PATCH TRANSDERMAL at 22:10

## 2017-12-03 NOTE — PROGRESS NOTES
Maternal Fetal Medicine Daily Note    S: Patient denies any issues this morning. Reports good fetal movement. No experiencing any further nicotine cravings with current patch. No contractions, vaginal discharge, vaginal bleeding, abdominal pain, fevers or chills.     O:  Patient Vitals for the past 24 hrs:   BP Temp Temp src Resp   17 0655 105/68 - - -   17 0625 149/59 97.7  F (36.5  C) Oral 18   17 0210 100/58 97.3  F (36.3  C) Oral 20   17 2126 - 97.5  F (36.4  C) Oral -   17 1651 109/55 97.9  F (36.6  C) Oral -   17 1200 - 97.9  F (36.6  C) Oral -     Gen: NAD, A/O  CV: RRR  Lungs: CTAB  Abd: Gravid, nontender  Ext: Negative    FHT: , mod annabelle, accels present, decels absent  [place]: No contractions    Imaging: Please see imaging tab for a copy of  ultrasound: MAUREEN 4.8cm, no 2x2 pocket, BPP 6/8 for MAUREEN, cephalic    Assessment/Plan:   Naheed Crouch is a 27 year old  at 29w1d by LMP c/w 7w5d U/S, admitted as transfer of care from [place] for PPROM at 26w5d. Pregnancy is complicated by history of LEEP, tobacco use, obesity. Doing well with no s/s of intrauterine infection or labor at this time.      (1)  PPROM at 26w5d   - Expectant management until 34w, delivery indicated for fetal/maternal status, labor, or infection.   - Infectious workup negative; currently VSS, afebrile, WBC 8.6   - s/p latency antibiotics      (2) FWB:   - FHT reassuring and appropriate for gestational age, continue TID monitoring and prn with fetal/maternal indications  - S/p BMZ (17-17), rescue course as clinically indicated  - IV magnesium indicated for neuroprotection if delivery is imminent before 32 weeks.   - S/p NICU consult  - Growth ultrasound last : EFW 38% (897g), MAUREEN 12.9cm, breech; repeat q3 weeks  - Continue twice weekly BPP Mon/Thur - last  BPP  with MAUREEN 4.8, cephalic     (3) Tobacco use:  - Continue nicotine patch 7 mg, taper down in ~1  week     (4) PNC:   - Rh positive - no rhogam indicated; Rubella immune - no MMR indicated; GBS positive, HIV NR, GC/Chlam NR, RPR NR, Hep B sAg NR. GCT 90.   - SW following  - s/p Flu and Tdap  - Continue daily PNV      5) Mode of delivery:  - Expectant management until 34w or indication for delivery including intra-amniotic infection, abruption, fetal indications, labor.  - Cephalic on last US, candidate for  if cephalic and reassuring maternal/fetal status  - If requires CS, patient desires BTL at time of CS, Private insurance. S/p consent for BTL and  upon admission.      6) Dispo:  - Inpatient until delivery and postpartum recovery    Aaliyah Bryant MD  Specialist in Maternal-Fetal Medicine

## 2017-12-03 NOTE — PLAN OF CARE
Problem: PROM, PPROM, Prolonged Rupture of Membranes (Adult,Obstetrics,Pediatric)  Goal: Signs and Symptoms of Listed Potential Problems Will be Absent, Minimized or Managed (PROM, PPROM, Prolonged Rupture of Membranes)  Signs and symptoms of listed potential problems will be absent, minimized or managed by discharge/transition of care (reference PROM, PPROM, Prolonged Rupture of Membranes (Adult,Obstetrics,Pediatric) CPG).   Outcome: No Change  VSS. Afebrile. Patient expressing feelings of frustration over hospital stay, and being in hospital over holidays without son.  Continues to leak fluid. Went for walk this evening with SO.  Plan to continue with cares.

## 2017-12-04 ENCOUNTER — APPOINTMENT (OUTPATIENT)
Dept: PHYSICAL THERAPY | Facility: CLINIC | Age: 27
End: 2017-12-04
Payer: COMMERCIAL

## 2017-12-04 ENCOUNTER — HOSPITAL ENCOUNTER (INPATIENT)
Dept: ULTRASOUND IMAGING | Facility: CLINIC | Age: 27
End: 2017-12-04
Attending: OBSTETRICS & GYNECOLOGY
Payer: COMMERCIAL

## 2017-12-04 PROCEDURE — 12000028 ZZH R&B OB UMMC

## 2017-12-04 PROCEDURE — 25000132 ZZH RX MED GY IP 250 OP 250 PS 637: Performed by: OBSTETRICS & GYNECOLOGY

## 2017-12-04 PROCEDURE — 97110 THERAPEUTIC EXERCISES: CPT | Mod: GP | Performed by: PHYSICAL THERAPIST

## 2017-12-04 PROCEDURE — 76819 FETAL BIOPHYS PROFIL W/O NST: CPT

## 2017-12-04 PROCEDURE — 97530 THERAPEUTIC ACTIVITIES: CPT | Mod: GP | Performed by: PHYSICAL THERAPIST

## 2017-12-04 PROCEDURE — 40000193 ZZH STATISTIC PT WARD VISIT: Performed by: PHYSICAL THERAPIST

## 2017-12-04 RX ADMIN — SENNOSIDES AND DOCUSATE SODIUM 1 TABLET: 8.6; 5 TABLET ORAL at 19:56

## 2017-12-04 RX ADMIN — Medication 1 CAPSULE: at 07:44

## 2017-12-04 RX ADMIN — PRENATAL VIT W/ FE FUMARATE-FA TAB 27-0.8 MG 1 TABLET: 27-0.8 TAB at 07:44

## 2017-12-04 NOTE — PROGRESS NOTES
Maternal Fetal Medicine Daily Note    S: Patient denies any issues this morning. Reports good fetal movement. No experiencing any further nicotine cravings with current patch. No contractions, vaginal discharge, vaginal bleeding, abdominal pain, fevers or chills.     O:  Patient Vitals for the past 24 hrs:   BP Temp Temp src Resp   17 0606 112/65 97.6  F (36.4  C) Oral 16   17 0040 110/60 98.4  F (36.9  C) Oral 16   17 2013 126/61 98  F (36.7  C) Oral 16   17 1600 105/59 98.4  F (36.9  C) Oral 16   17 1150 116/66 98.6  F (37  C) - 18     Gen: NAD, A/O  Abd: Gravid, nontender  Ext: Negative    FHT: , mod annabelle, accels present, decels absent  [place]: No contractions    Imaging: Please see imaging tab for a copy of  ultrasound: MAUREEN 4.8cm, no 2x2 pocket, BPP 6/8 for MAUREEN, cephalic    Assessment/Plan:   Naheed Crouch is a 27 year old  at 29w2d by LMP c/w 7w5d U/S, admitted as transfer of care from [place] for PPROM at 26w5d. Pregnancy is complicated by history of LEEP, tobacco use, obesity. Doing well with no s/s of intrauterine infection or labor at this time.      (1)  PPROM at 26w5d   - Expectant management until 34w, delivery indicated for fetal/maternal status, labor, or infection.   - Infectious workup negative; currently VSS, afebrile, WBC 8.6   - s/p latency antibiotics      (2) FWB:   - FHT reassuring and appropriate for gestational age, continue TID monitoring and prn with fetal/maternal indications  - S/p BMZ (17-17), rescue course as clinically indicated  - IV magnesium indicated for neuroprotection if delivery is imminent before 32 weeks.   - S/p NICU consult  - Growth ultrasound last : EFW 38% (897g), MAUREEN 12.9cm, breech; repeat q3 weeks (ordered next for 17)  - Continue twice weekly BPP Mon/Thur - last  BPP 6/8 with MAUREEN 4.8, cephalic     (3) Tobacco use:  - Continue nicotine patch 7 mg, taper down in ~1 week     (4) PNC:   - Rh  positive - no rhogam indicated; Rubella immune - no MMR indicated; GBS positive, HIV NR, GC/Chlam NR, RPR NR, Hep B sAg NR. GCT 90.   - SW following  - s/p Flu and Tdap  - Continue daily PNV      5) Mode of delivery:  - Expectant management until 34w or indication for delivery including intra-amniotic infection, abruption, fetal indications, labor.  - Cephalic on last US, candidate for  if cephalic and reassuring maternal/fetal status  - If requires CS, patient desires BTL at time of CS, Private insurance. S/p consent for BTL and  upon admission.      6) Dispo:  - Inpatient until delivery and postpartum recovery    Scribe Disclosure:   I, Manuel Cha, am serving as a scribe; to document services personally performed by Dr. Bryant - -based on data collection and the provider's statements to me.     This note, as scribed, accurately reflects the examination, my impressions and plan as discussed with the patient.    Aaliyah Bryant MD  Specialist in Maternal-Fetal Medicine

## 2017-12-04 NOTE — PROGRESS NOTES
[facility]  MATERNAL CHILD HEALTH   SOCIAL WORK PROGRESS NOTE    Checked in with Naheed in antepartum room. Her partner/FOB, Salvador continues to be bedside and supportive. He has not yet left the hospital and does not plan to. Their 5 year old continues to be cared for by Naheed's parents while she is in the hospital. Naheed does report feeling antsy and eager to be out of the hospital. However, is thankful to remain in antepartum. She continues to pass time through watching TV and playing video games. She denied any concerns with her mood, sleep, or appetite at this time. Naheed is aware of social work availability. Family denied having any questions, concerns, or resource needs at this time. Social work will continue to assess needs and provide ongoing psychosocial support and access to resources.     HARRISON Tang, [facility]   Social Worker  Maternal Child Health   Phone: [phone]  Pager: [phone]

## 2017-12-04 NOTE — PLAN OF CARE
Problem: Patient Care Overview  Goal: Plan of Care/Patient Progress Review  PT: Pt continues to demonstrate independence with all functional mobility. Reports compliance with walking program, though less consistent with stretching HEP. Reviewed all exercises with emphasis on piriformis and nerve flossing stretch for pain relief/decreased tension. Pt understanding, denies further questions. HEP reminder written on whiteboard for improved pt compliance. Will disch from therapy and complete PT orders.    Physical Therapy Discharge Summary    Reason for therapy discharge:    All goals and outcomes met, no further needs identified.    Progress towards therapy goal(s). See goals on Care Plan in [facility] electronic health record for goal details.  Goals met    Therapy recommendation(s):    Continue home exercise program.

## 2017-12-04 NOTE — PROVIDER NOTIFICATION
12/04/17 0835   Provider Notification   Provider Name/Title Dr. Hurley, Dr. Bryant, med student   Method of Notification At Bedside   Request Evaluate in Person   Notification Reason Status Update   Provider here for morning rounds and EFM strip review. No concerns. Continue expectant management.

## 2017-12-04 NOTE — PLAN OF CARE
Problem: Patient Care Overview  Goal: Plan of Care/Patient Progress Review  Outcome: No Change   Premature Rupture of Membranes  Data: Afebrile. Leaking scant  amounts of clear fluid. Contraction pattern stable and within parameters. Fetal assessment Reactive. Signs and symptoms of infection absent.  Last BPP on : 6/8, MAUREEN: 4.8.  Latency antibiotic course complete.  Betamethasone Completed given on  & .   Interventions: Monitor vital signs and indicators of infection every 4 hours while awake. Continue uterine/fetal assessment 3 times daily.  BPP/doppler every Thursday, Monday.  Activity level: Regular activity. Preventive measures include Positioning and Frequent voiding. Encourage active range of motion and frequent position changes.  Plan: Continue expectant management. Observe for and notify care provider of indicators of progressing labor, signs/symptoms of infection, or fetal/maternal compromise.

## 2017-12-05 PROCEDURE — 25000132 ZZH RX MED GY IP 250 OP 250 PS 637: Performed by: OBSTETRICS & GYNECOLOGY

## 2017-12-05 PROCEDURE — 12000028 ZZH R&B OB UMMC

## 2017-12-05 RX ADMIN — PRENATAL VIT W/ FE FUMARATE-FA TAB 27-0.8 MG 1 TABLET: 27-0.8 TAB at 10:31

## 2017-12-05 RX ADMIN — Medication 1 CAPSULE: at 10:32

## 2017-12-05 RX ADMIN — SENNOSIDES AND DOCUSATE SODIUM 1 TABLET: 8.6; 5 TABLET ORAL at 19:30

## 2017-12-05 NOTE — PROGRESS NOTES
Maternal Fetal Medicine Daily Note    S: No significant events overnight. Patient denies any issues this morning. Reports good fetal movement. No longer using nicotine patch as it makes her skin itch, and is not experiencing any further nicotine cravings. Continues to have minimal clear leakage of fluid. No contractions, vaginal discharge, vaginal bleeding, abdominal pain, fevers or chills, SOB or CP.     O:  Patient Vitals for the past 24 hrs:   BP Temp Temp src Resp Weight   17 0600 - - - - 108 kg (238 lb 3.2 oz)   17 2330 113/64 97.7  F (36.5  C) Oral 16 -   17 1947 115/61 98  F (36.7  C) Oral 18 -   17 1554 121/69 98.4  F (36.9  C) Oral 18 -   17 1305 118/60 98  F (36.7  C) Oral 16 -     Gen: no acute distress, resting comfortably in bed.   CV: skin warm and well perfused   Resp: breathing comfortably  Abd: Gravid, nontender  Ext: no edema    FHT: , mod annabelle, accels present, decels absent   [place]: No contractions    Imaging: Please see imaging tab for a copy of  ultrasound: MAUREEN 4.8cm, no 2x2 pocket, BPP 6/8 for MAUREEN, cephalic    Assessment/Plan:   Naheed Crouch is a 27 year old  at 29w3d by LMP c/w 7w5d U/S, admitted as transfer of care from [place] for PPROM at 26w5d. Pregnancy is complicated by history of LEEP, tobacco use, obesity. Doing well with no s/s of intrauterine infection or labor at this time.      (1)  PPROM at 26w5d   - Expectant management until 34w, delivery indicated for fetal/maternal status, labor, or infection.   - Infectious workup negative; currently VSS, afebrile, WBC 8.6   - s/p latency antibiotics      (2) FWB:   - FHT reassuring and appropriate for gestational age, continue TID monitoring and prn with fetal/maternal indications  - S/p BMZ (17-17), rescue course as clinically indicated  - IV magnesium indicated for neuroprotection if delivery is imminent before 32 weeks.   - S/p NICU consult  - Growth ultrasound last  : EFW 38% (897g), MAUREEN 12.9cm, breech; repeat q3 weeks (ordered next for 17)  - Continue twice weekly BPP Mon/Thur - last  BPP 6/8 with MAUREEN 4.8, cephalic     (3) Tobacco use:  - has stopped nicotine patch due to skin irritation   - has nicotine gum available although no current cravings      (4) PNC:   - Rh positive - no rhogam indicated; Rubella immune - no MMR indicated; GBS positive, HIV NR, GC/Chlam NR, RPR NR, Hep B sAg NR. GCT 90.   - SW following  - s/p Flu and Tdap  - Continue daily PNV      5) Mode of delivery:  - Expectant management until 34w or indication for delivery including intra-amniotic infection, abruption, fetal indications, labor.  - Cephalic on last US, candidate for  if cephalic and reassuring maternal/fetal status  - If requires CS, patient desires BTL at time of CS, Private insurance. S/p consent for BTL and  upon admission.      6) Dispo:  - Inpatient until delivery and postpartum recovery    Scribe Disclosure:   I, Manuel Cha, am serving as a scribe; to document services personally performed by Dr. Bryant - -based on data collection and the provider's statements to me.     This note, as scribed, accurately reflects the examination, my impressions and plan as discussed with the patient.    Aaliyah Bryant MD  Specialist in Maternal-Fetal Medicine

## 2017-12-05 NOTE — PROVIDER NOTIFICATION
12/04/17 1900   Provider Notification   Provider Name/Title Dr. Castillo   Method of Notification Phone   Request Evaluate - Remote   Notification Reason Status Update   Provider notified that patient called to RN stating she thought she was allergic to her nicotine patch. She said it always itches and leaves a red square steve underneath the patch. The patch area feels warm and is slightly reddened. Pt stated she wanted to be done with the patches. She hasn't had any cravings and is ready to try not having the patch. Provider discontinued Nicotine patch orders, RN removed patch. Nicotine gum ordered PRN, pt stated the gum hasn't worked for her in the past but appreciated having it available in case.

## 2017-12-05 NOTE — PLAN OF CARE
Problem: Patient Care Overview  Goal: Plan of Care/Patient Progress Review  Outcome: No Change  Patient stable. Leaking clear fluid. No s/s of infection or PTL. No contractions, FHR cat 1. Continue with plan of care.

## 2017-12-05 NOTE — PLAN OF CARE
Problem: Patient Care Overview  Goal: Plan of Care/Patient Progress Review  Outcome: No Change  Data: Afebrile, VSS.  Leaking scant amounts of clear fluid.  Patient not feeling any ctx and no ctx noted on toco monitor.  Fetal assessment AGA.  Action: Fetal and uterine monitoring TID.  Monitor for s/s of infection or labor.  Preventive practices include frequent voiding, oral fluid intake, and positioning.  Response: Patient reports feeling tired today.  Has been napping and relaxing in room playing video games with support person.

## 2017-12-05 NOTE — PLAN OF CARE
Problem: Patient Care Overview  Goal: Plan of Care/Patient Progress Review  Outcome: No Change   Premature Rupture of Membranes  Data: Afebrile. Leaking small amounts of clear fluid. Contraction pattern stable and within parameters. Fetal assessment Appropriate for Gestational Age. Signs and symptoms of infection absent.  Last BPP on : 6/8, MAUREEN:4.7.  Latency antibiotic course complete.  Betamethasone Completed given on  & .   Interventions: Monitor vital signs and indicators of infection every shift. Continue uterine/fetal assessment 3 times daily.  BPP/doppler every Monday, Thursday, .  Activity level: Regular activity. Preventive measures include Positioning and Frequent voiding. Encourage active range of motion and frequent position changes.  Plan: Continue expectant management. Observe for and notify care provider of indicators of progressing labor, signs/symptoms of infection, or fetal/maternal compromise.

## 2017-12-06 PROCEDURE — 25000132 ZZH RX MED GY IP 250 OP 250 PS 637: Performed by: OBSTETRICS & GYNECOLOGY

## 2017-12-06 PROCEDURE — 12000028 ZZH R&B OB UMMC

## 2017-12-06 RX ADMIN — Medication 1 CAPSULE: at 08:17

## 2017-12-06 RX ADMIN — SENNOSIDES AND DOCUSATE SODIUM 1 TABLET: 8.6; 5 TABLET ORAL at 20:57

## 2017-12-06 RX ADMIN — PRENATAL VIT W/ FE FUMARATE-FA TAB 27-0.8 MG 1 TABLET: 27-0.8 TAB at 08:16

## 2017-12-06 NOTE — PLAN OF CARE
Problem: PROM, PPROM, Prolonged Rupture of Membranes (Adult,Obstetrics,Pediatric)  Goal: Signs and Symptoms of Listed Potential Problems Will be Absent, Minimized or Managed (PROM, PPROM, Prolonged Rupture of Membranes)  Signs and symptoms of listed potential problems will be absent, minimized or managed by discharge/transition of care (reference PROM, PPROM, Prolonged Rupture of Membranes (Adult,Obstetrics,Pediatric) CPG).   Pt doing well, no complaints. No definite plans today but would like to go outside as she likes cold weather. Plan for breakfast shower and bed change. Will call with changes or needs. FOB at bedside.

## 2017-12-06 NOTE — PLAN OF CARE
Problem: PROM, PPROM, Prolonged Rupture of Membranes (Adult,Obstetrics,Pediatric)  Goal: Signs and Symptoms of Listed Potential Problems Will be Absent, Minimized or Managed (PROM, PPROM, Prolonged Rupture of Membranes)  Signs and symptoms of listed potential problems will be absent, minimized or managed by discharge/transition of care (reference PROM, PPROM, Prolonged Rupture of Membranes (Adult,Obstetrics,Pediatric) CPG).   Outcome: Improving  Pt stable, VS WDL. Pt denies pain, contractions, bleeding. Leaking small amounts clear fluid. Afebrile. FHR appropriate for gestational age.

## 2017-12-06 NOTE — PROGRESS NOTES
CLINICAL NUTRITION SERVICES - REASSESSMENT NOTE     Nutrition Prescription    RECOMMENDATIONS FOR MDs/PROVIDERS TO ORDER:  None today     Malnutrition Status:    Patient does not meet two of the above criteria necessary for diagnosing malnutrition     Recommendations already ordered by Registered Dietitian (RD):  None today     Future/Additional Recommendations:  None today     EVALUATION OF THE PROGRESS TOWARD GOALS   Diet: Regular  Intake: Patient reports she continues to eat well. She is regularly eating food from outside, but she does order 1-2 meals/day from hospital .       NEW FINDINGS   - Weight up approx 1 lb since admission 2 weeks ago which is on track with recommended weight gain of 0.2 to 0.45 lbs per week (total of 15 lbs through out pregnancy).     MALNUTRITION  % Intake: No decreased intake noted  % Weight Loss: None noted  Subcutaneous Fat Loss: None observed  Muscle Loss: None observed  Fluid Accumulation/Edema: None noted  Malnutrition Diagnosis: Patient does not meet two of the above criteria necessary for diagnosing malnutrition    Previous Goals   No previous goals    Previous Nutrition Diagnosis  No previous nutrition diagnosis.    CURRENT NUTRITION DIAGNOSIS  No nutrition diagnosis at this time.    INTERVENTIONS  Implementation  Encourage po intake for adequate nutrition/ weight gain throughout pregnancy.    Monitoring/Evaluation  Will be monitored and evaluated per protocol q 14 days.       Magaly Jones RD

## 2017-12-06 NOTE — PROGRESS NOTES
"Maternal Fetal Medicine Daily Note     S: Patient denies any issues this morning. Reports good fetal movement. No contractions, vaginal discharge, vaginal bleeding, abdominal pain, fevers or chills.      O:  /60  Pulse 71  Temp 97.9  F (36.6  C) (Oral)  Resp 16  Ht 1.727 m (5' 8\")  Wt 108 kg (238 lb 3.2 oz)  LMP  (LMP Unknown)  BMI 36.22 kg/m2     Gen: NAD, A/O  Abd: Gravid, nontender  Ext: Negative     FHT: , mod annabelle, accels present, decels absent  [place]: No contractions     Imaging: Please see imaging tab for a copy of  ultrasound: MAUREEN 4.8cm, no 2x2 pocket, BPP /8 for MAUREEN, cephalic     Assessment/Plan:   Naheed Crouch is a 27 year old  at 29w4d  by LMP c/w 7w5d U/S, admitted as transfer of care from [place] for PPROM at 26w5d. Pregnancy is complicated by history of LEEP, tobacco use, obesity. Doing well with no s/s of intrauterine infection or labor at this time.      (1)  PPROM at 26w5d   - Expectant management until 34w, delivery indicated for fetal/maternal status, labor, or infection.   - Infectious workup negative; currently VSS, afebrile, WBC 8.6   - s/p latency antibiotics       (2) FWB:   - FHT reassuring and appropriate for gestational age, continue TID monitoring and prn with fetal/maternal indications  - S/p BMZ (17-17), rescue course as clinically indicated  - IV magnesium indicated for neuroprotection if delivery is imminent before 32 weeks.   - S/p NICU consult  - Growth ultrasound last : EFW 38% (897g), MAUREEN 12.9cm, breech; repeat q3 weeks (ordered next for 17)  - Continue twice weekly BPP Mon/Thur - last  BPP /8 with MAUREEN 4.8, cephalic      (3) Tobacco use:  - Nicotine gum ordered if needed      (4) PNC:   - Rh positive - no rhogam indicated; Rubella immune - no MMR indicated; GBS positive, HIV NR, GC/Chlam NR, RPR NR, Hep B sAg NR. GCT 90.   - SW following  - s/p Flu and Tdap  - Continue daily PNV      5) Mode of delivery:  - " Expectant management until 34w or indication for delivery including intra-amniotic infection, abruption, fetal indications, labor.  - Cephalic on last US, candidate for  if cephalic and reassuring maternal/fetal status  - If requires CS, patient desires BTL at time of CS, Private insurance. S/p consent for BTL and  upon admission.      6) Dispo:  - Inpatient until delivery and postpartum recovery    Niall Hurley MD  2017    Physician Attestation   I, Aaliyah Bryant, saw this patient with the resident and agree with the resident s findings and plan of care as documented in the fellow s note.      I personally reviewed vital signs, medications, labs and imaging.    Key findings: Reassuring fetal status. No signs or symptoms of infection, labor or abruption. BPP and evaluation of fetal growth tomorrow.    Aaliyah Bryant  Date of Service (when I saw the patient): 17

## 2017-12-07 ENCOUNTER — HOSPITAL ENCOUNTER (INPATIENT)
Dept: ULTRASOUND IMAGING | Facility: CLINIC | Age: 27
End: 2017-12-07
Payer: COMMERCIAL

## 2017-12-07 PROCEDURE — 76816 OB US FOLLOW-UP PER FETUS: CPT

## 2017-12-07 PROCEDURE — 12000028 ZZH R&B OB UMMC

## 2017-12-07 PROCEDURE — 25000132 ZZH RX MED GY IP 250 OP 250 PS 637: Performed by: OBSTETRICS & GYNECOLOGY

## 2017-12-07 PROCEDURE — 25000128 H RX IP 250 OP 636: Performed by: OBSTETRICS & GYNECOLOGY

## 2017-12-07 PROCEDURE — 76819 FETAL BIOPHYS PROFIL W/O NST: CPT | Performed by: OBSTETRICS & GYNECOLOGY

## 2017-12-07 RX ORDER — BETAMETHASONE SODIUM PHOSPHATE AND BETAMETHASONE ACETATE 3; 3 MG/ML; MG/ML
12 INJECTION, SUSPENSION INTRA-ARTICULAR; INTRALESIONAL; INTRAMUSCULAR; SOFT TISSUE EVERY 24 HOURS
Status: COMPLETED | OUTPATIENT
Start: 2017-12-07 | End: 2017-12-08

## 2017-12-07 RX ADMIN — BETAMETHASONE SODIUM PHOSPHATE AND BETAMETHASONE ACETATE 12 MG: 3; 3 INJECTION, SUSPENSION INTRA-ARTICULAR; INTRALESIONAL; INTRAMUSCULAR at 11:04

## 2017-12-07 RX ADMIN — PRENATAL VIT W/ FE FUMARATE-FA TAB 27-0.8 MG 1 TABLET: 27-0.8 TAB at 09:26

## 2017-12-07 RX ADMIN — SENNOSIDES AND DOCUSATE SODIUM 1 TABLET: 8.6; 5 TABLET ORAL at 19:43

## 2017-12-07 RX ADMIN — Medication 1 CAPSULE: at 09:26

## 2017-12-07 NOTE — PLAN OF CARE
Problem: Patient Care Overview  Goal: Plan of Care/Patient Progress Review  Outcome: No Change  VSS. Afebrile. Denies cramping or pain. No bleeding. States she filled one pad of clear fluid overnight. Morning EFM in process. Continue to monitor.

## 2017-12-07 NOTE — PROGRESS NOTES
"Maternal Fetal Medicine Daily Note      S: Patient denies any issues this morning. Reports good fetal movement. No contractions, vaginal discharge, vaginal bleeding, abdominal pain, fevers or chills. She is tearful given the new findings of US, reassured that these findings don't require immediate delivery but we will order rescue steroids in case findings worsen, was reassured as well that current FHT is category I so we will just continue monitoring.      O:  /65  Pulse 82  Temp 98.1  F (36.7  C) (Oral)  Resp 18  Ht 1.727 m (5' 8\")  Wt 108 kg (238 lb 3.2 oz)  LMP  (LMP Unknown)  BMI 36.22 kg/m2  Gen: NAD, A/O  Abd: Gravid, nontender  Ext: Negative      FHT: , mod annabelle, accels present, decels absent  [place]: No contractions      Imaging: Please see imaging tab for a copy of  ultrasound, shows FGR with AC <3%, MAUREEN 2.7, intermittent absent diastolic flow, bethany breech presenation      Assessment/Plan:   Naheed Crouch is a 27 year old  at 29w5d   by LMP c/w 7w5d U/S, admitted as transfer of care from [place] for PPROM at 26w5d. Pregnancy is complicated by history of LEEP, tobacco use, obesity and new onset of FGR. Doing well with no s/s of intrauterine infection or labor at this time.      (1)  PPROM at 26w5d   - Expectant management until 34w, delivery indicated for fetal/maternal status, labor, infection or worsening Doppler findings.  - Infectious workup negative; currently VSS, afebrile, WBC 8.6   - s/p latency antibiotics     (2) Fetal growth restriction with abnormal Doppler studies   - Repeat BPP and UA Doppler study tomorrow. Frequency of subsequent testing will be based on the results of the ultrasound tomorrow.    (3) FWB:   - FHT reassuring and appropriate for gestational age, continue TID monitoring and prn with fetal/maternal indications  - S/p BMZ (17-17), will give rescue steroids today given new finding of FGR and abnormal Doppler studies  - IV " magnesium indicated for neuroprotection if delivery is imminent before 32 weeks.   - S/p NICU consult  - Growth ultrasound done today, see details above, next in 2 weeks   - Repeat Doppler US tomorrow   - Continue twice weekly BPP Mon/Thur.      (4) Tobacco use:  - Nicotine gum ordered if needed      (5) PNC:   - Rh positive - no rhogam indicated; Rubella immune - no MMR indicated; GBS positive, HIV NR, GC/Chlam NR, RPR NR, Hep B sAg NR. GCT 90.   - SW following  - s/p Flu and Tdap  - Continue daily PNV      (6) Mode of delivery:  - Expectant management until 34w or indication for delivery including intra-amniotic infection, abruption, fetal indications, labor, or worsening Doppler studies.  - Breech on last US, candidate for  if cephalic and reassuring maternal/fetal status  - If requires CS, patient desires BTL at time of CS, Private insurance. S/p consent for BTL and  upon admission.      (7) Dispo:  - Inpatient until delivery and postpartum recovery     Niall Hurley MD  2017    Physician Attestation   I, Aaliyah Bryant, saw this patient with the resident and agree with the resident s findings and plan of care as documented in the resident s note.      I personally reviewed vital signs, medications, labs and imaging.    Key findings: New finding of FGR. May be secondary to uteroplacental insufficiency given abnormal Doppler findings seen today. Given the change in UA Dopplers and new diagnosis of FGR a rescue course of betamethasone is recommended at this time. Will plan to repeat UA Dopplers and BPP tomorrow.    Aaliyah Bryant  Date of Service (when I saw the patient): 17

## 2017-12-07 NOTE — PLAN OF CARE
Problem: PROM, PPROM, Prolonged Rupture of Membranes (Adult,Obstetrics,Pediatric)  Goal: Signs and Symptoms of Listed Potential Problems Will be Absent, Minimized or Managed (PROM, PPROM, Prolonged Rupture of Membranes)  Signs and symptoms of listed potential problems will be absent, minimized or managed by discharge/transition of care (reference PROM, PPROM, Prolonged Rupture of Membranes (Adult,Obstetrics,Pediatric) CPG).   Outcome: No Change  Data: Afebrile. Leaking large amounts of clear fluid. Contraction pattern , patient denies contractions. Fetal assessment Appropriate for Gestational Age. Signs and symptoms of infection absent.  Interventions: Monitor vital signs and indicators of infection every 4 hours while awake. Continue uterine/fetal assessment TID and PRN. Activity level: up ad theodora. Preventive measures include Positioning and Frequent voiding. Encourage active range of motion and frequent position changes.  Plan: Continue expectant management. Observe for and notify care provider of indicators of progressing labor, signs/symptoms of infection, or fetal/maternal compromise.  FHT category I.  Will continue to monitor.

## 2017-12-07 NOTE — PLAN OF CARE
Problem: PROM, PPROM, Prolonged Rupture of Membranes (Adult,Obstetrics,Pediatric)  Goal: Signs and Symptoms of Listed Potential Problems Will be Absent, Minimized or Managed (PROM, PPROM, Prolonged Rupture of Membranes)  Signs and symptoms of listed potential problems will be absent, minimized or managed by discharge/transition of care (reference PROM, PPROM, Prolonged Rupture of Membranes (Adult,Obstetrics,Pediatric) CPG).   Pt appropriately teary this morning with rounds r/t recent dx of IUGR and change in dopplers. MDs discussed steroids and repeat BPP tomorrow and enc pt re continued close monitoring and doing well maintaining pregnancy to this point. Writer also enc pt to discuss needs, questions, thoughts to understand dx and plan. Cont AP cares.

## 2017-12-08 ENCOUNTER — HOSPITAL ENCOUNTER (INPATIENT)
Dept: ULTRASOUND IMAGING | Facility: CLINIC | Age: 27
End: 2017-12-08
Payer: COMMERCIAL

## 2017-12-08 PROCEDURE — 12000028 ZZH R&B OB UMMC

## 2017-12-08 PROCEDURE — 25000132 ZZH RX MED GY IP 250 OP 250 PS 637: Performed by: OBSTETRICS & GYNECOLOGY

## 2017-12-08 PROCEDURE — 76819 FETAL BIOPHYS PROFIL W/O NST: CPT

## 2017-12-08 PROCEDURE — 25000128 H RX IP 250 OP 636: Performed by: OBSTETRICS & GYNECOLOGY

## 2017-12-08 PROCEDURE — 76820 UMBILICAL ARTERY ECHO: CPT | Performed by: OBSTETRICS & GYNECOLOGY

## 2017-12-08 RX ADMIN — Medication 1 CAPSULE: at 07:45

## 2017-12-08 RX ADMIN — PRENATAL VIT W/ FE FUMARATE-FA TAB 27-0.8 MG 1 TABLET: 27-0.8 TAB at 07:45

## 2017-12-08 RX ADMIN — SENNOSIDES AND DOCUSATE SODIUM 1 TABLET: 8.6; 5 TABLET ORAL at 19:53

## 2017-12-08 RX ADMIN — BETAMETHASONE SODIUM PHOSPHATE AND BETAMETHASONE ACETATE 12 MG: 3; 3 INJECTION, SUSPENSION INTRA-ARTICULAR; INTRALESIONAL; INTRAMUSCULAR at 11:23

## 2017-12-08 NOTE — PLAN OF CARE
Problem: PROM, PPROM, Prolonged Rupture of Membranes (Adult,Obstetrics,Pediatric)  Goal: Signs and Symptoms of Listed Potential Problems Will be Absent, Minimized or Managed (PROM, PPROM, Prolonged Rupture of Membranes)  Signs and symptoms of listed potential problems will be absent, minimized or managed by discharge/transition of care (reference PROM, PPROM, Prolonged Rupture of Membranes (Adult,Obstetrics,Pediatric) CPG).   Outcome: No Change  Patient's VSS. FHR category 1 at this time. Patient denies any contractions, or bleeding. Patient continues to leak a small amount of clear fluid. No concerns at this time. Will continue to monitor and update provider with any changes or concerns.

## 2017-12-08 NOTE — PLAN OF CARE
Problem: PROM, PPROM, Prolonged Rupture of Membranes (Adult,Obstetrics,Pediatric)  Goal: Signs and Symptoms of Listed Potential Problems Will be Absent, Minimized or Managed (PROM, PPROM, Prolonged Rupture of Membranes)  Signs and symptoms of listed potential problems will be absent, minimized or managed by discharge/transition of care (reference PROM, PPROM, Prolonged Rupture of Membranes (Adult,Obstetrics,Pediatric) CPG).   Outcome: No Change  Pt continues to leak scant amounts of clear fluid. Denies contractions, bleeding. Pt has no complaints. Continue current plan.

## 2017-12-08 NOTE — PROGRESS NOTES
"[facility]  MATERNAL CHILD HEALTH   SOCIAL WORK PROGRESS NOTE    Checked in with mom bedside. Mom discussed the news that she received this week related to baby's growth restriction. She identified the entire hospitalization has been stressful as the complications were not something she has managed. She discussed the beginning of her pregnancy and the bleeding she experienced. She identified the stress associated with this early complication, as she has experienced a miscarriage. She reported today was a better day as the information from the ultrasound today \"looked better\" than yesterday. She expressed a desire to smoke a cigarette yesterday. However, did not and is feeling proud about this.     She was tearful during our visit. She did express difficulty with being the hospital and away from her 5 year old. However, is committed to do what is best for baby. She is looking forward to feeling \"normal again.\" She identified having some difficult days. However, has been grateful for the support she has received from her partner. She denied any concerns with her mood. However, is appropriately sad/worried. She is looking forward to her son, Xu visiting this weekend. Social work will continue to assess needs and provide ongoing psychosocial support and access to resources.     HARRISON Tang, [facility]   Social Worker  Maternal Child Health   Phone: [phone]  Pager: [phone]        "

## 2017-12-08 NOTE — PLAN OF CARE
Problem: Patient Care Overview  Goal: Plan of Care/Patient Progress Review  Outcome: No Change  Patient continues to leak small amount of clear fluid, remains afebrile  Fetal heart tones reactive.  Patient does not complain of contractions/cramping. VS WNL. Support person at bedside. Continue plan of care.

## 2017-12-08 NOTE — PROGRESS NOTES
"Maternal Fetal Medicine Daily Note      S: Patient denies any issues this morning. Reports good fetal movement. No contractions, vaginal discharge, vaginal bleeding, abdominal pain, fevers or chills.    O:  /68  Pulse 82  Temp 98.1  F (36.7  C) (Oral)  Resp 18  Ht 1.727 m (5' 8\")  Wt 108 kg (238 lb 3.2 oz)  LMP  (LMP Unknown)  BMI 36.22 kg/m2  Gen: NAD, A/O  Abd: Gravid, nontender  Ext: Negative      FHT: , mod annabelle, accels present, decels absent  [place]: No contractions      Imaging: U/S today showed new subchorionic hemorrhage, persistent oligohydramnios and BPP 6/8 (-2 for fluid).      Assessment/Plan:   Naheed Crouch is a 27 year old  at 29w6d   by LMP c/w 7w5d U/S, admitted as transfer of care from [place] for PPROM at 26w5d. Pregnancy is complicated by history of LEEP, tobacco use, obesity and new onset of FGR, and marginal abruption. Doing well with no s/s of intrauterine infection or labor at this time.      (1)  PPROM at 26w5d   - Expectant management until 34w, delivery indicated for fetal/maternal status, labor, infection or worsening Doppler findings.  - Infectious workup negative; currently VSS, afebrile, WBC 8.6   - s/p latency antibiotics     (2) Fetal growth restriction with history of abnormal UA Doppler studies   - Doppler studies normalized today. Will plan to repeat BPP and Dopplers on Monday    (3) FWB:   - FHT reassuring and appropriate for gestational age, continue TID monitoring and prn with fetal/maternal indications  - S/p BMZ (17-17), (17 and second dose scheduled for today)  - IV magnesium indicated for neuroprotection if delivery is imminent before 32 weeks  - S/p NICU consult  - Evaluation of fetal growth every 2 weeks given FGR   - Continue twice weekly BPP and UA Doppler Mon/Thur, or more frequently if otherwise clinically indicated      (4) Tobacco use:  - Nicotine gum ordered if needed      (5) PNC:   - Rh positive - no rhogam " indicated; Rubella immune - no MMR indicated; GBS positive, HIV NR, GC/Chlam NR, RPR NR, Hep B sAg NR. GCT 90.   - SW following  - s/p Flu and Tdap  - Continue daily PNV      (6) Mode of delivery:  - Expectant management until 34w or indication for delivery including intra-amniotic infection, abruption, fetal indications, labor, or worsening Doppler studies.  - Breech on last US, candidate for  if cephalic and reassuring maternal/fetal status  - If requires CS, patient desires BTL at time of CS, Private insurance. S/p consent for BTL and  upon admission.    (7) Marginal abruption:  Clinically stable with no signs of active bleeding, labor or non-reassuring fetal status.  - Reassess the area of suspected hemorrhage on 17      (8) Dispo:  - Inpatient until delivery and postpartum recovery     Aaliyah Bryant MD  Specialist in Maternal-Fetal Medicine

## 2017-12-09 PROCEDURE — 12000028 ZZH R&B OB UMMC

## 2017-12-09 PROCEDURE — 25000132 ZZH RX MED GY IP 250 OP 250 PS 637: Performed by: OBSTETRICS & GYNECOLOGY

## 2017-12-09 RX ADMIN — CALCIUM CARBONATE (ANTACID) CHEW TAB 500 MG 500 MG: 500 CHEW TAB at 17:50

## 2017-12-09 RX ADMIN — PRENATAL VIT W/ FE FUMARATE-FA TAB 27-0.8 MG 1 TABLET: 27-0.8 TAB at 07:51

## 2017-12-09 RX ADMIN — Medication 1 CAPSULE: at 07:51

## 2017-12-09 RX ADMIN — SENNOSIDES AND DOCUSATE SODIUM 1 TABLET: 8.6; 5 TABLET ORAL at 07:51

## 2017-12-09 RX ADMIN — SENNOSIDES AND DOCUSATE SODIUM 1 TABLET: 8.6; 5 TABLET ORAL at 20:46

## 2017-12-09 NOTE — PLAN OF CARE
Problem: PROM, PPROM, Prolonged Rupture of Membranes (Adult,Obstetrics,Pediatric)  Goal: Signs and Symptoms of Listed Potential Problems Will be Absent, Minimized or Managed (PROM, PPROM, Prolonged Rupture of Membranes)  Signs and symptoms of listed potential problems will be absent, minimized or managed by discharge/transition of care (reference PROM, PPROM, Prolonged Rupture of Membranes (Adult,Obstetrics,Pediatric) CPG).   Outcome: Improving  Per pt report, she had a large gush of clear fluid when baby was actively moving around. Since then fluid has been scant. Denies bleeding, abdominal cramping/cx. No cx on toco. FHR appropriate for gestational age (see Flowsheets for details). VSS. Will continue to monitor.

## 2017-12-09 NOTE — PROGRESS NOTES
Maternal Fetal Medicine Daily Note      S: Naheed is feeling well but bored today. She denies contractions, cramping, abdominal pain. She reports ongoing leakage of clear fluid. She denies vaginal bleeding. Denies fevers or chills. Reports normal fetal movement. She is going to get a tour of the NICU today -- this makes her nervous as she was fearful when the were asked to consult on her upon admission but feels this is something she needs to do.     O:  Vitals:    17 2320 17 0515 17 0752   BP: 123/68 120/62 121/59 118/64   Pulse:       Resp:     Temp:  98  F (36.7  C) 97.9  F (36.6  C) 98.7  F (37.1  C)   TempSrc:  Oral Oral Oral   Weight:       Height:           Gen: NAD, lying in bed comfortably  CV: Regular rate, well perfused  Pulm: Normal respiratory effort  Abd: Gravid, nontender  Ext: Lower extremities without swelling or erythema      FHT: , mod variability, accelerations present, one late deceleration lasting 1-2 minutes  [place]: No contractions      Imagin/8/17 OBUS  Cardiac activity: present.  bpm.  Fetal movements: visualized.  Presentation: incomplete breech.  Placenta:  Placental site: anterior, right fundal. Subchorionic hemorrhage (Size 83.1 mm x 23.6 mm x 47.3 mm). Anterior right.  Umbilical cord: previously studied.     AMNIOTIC FLUID ASSESSMENT  ---------------------------------------------------------------------------------------------------------  Amount of AF: Oligohydramnios  MVP 1.8 cm. MAUREEN 4.8 cm. Q1 1.8 cm, Q2 1.5 cm, Q3 0.0 cm, Q4 1.5 cm        BIOPHYSICAL PROFILE  ---------------------------------------------------------------------------------------------------------  2: Fetal breathing movements  2: Gross body movements  2: Fetal tone  0: Amniotic fluid volume  : Biophysical profile score    Assessment/Plan:   Naheed Crouch is a 27 year old  at 30w0d by LMP c/w 7w5d US, admitted as transfer of care from [place] for PPROM at 26w5d. Pregnancy is complicated by history of LEEP, tobacco use, obesity and new onset of FGR, and marginal abruption. Doing well with no s/s of intrauterine infection or labor at this time.      #) PPROM at 26w5d   - Expectant management until 34w, delivery indicated for fetal/maternal status, labor, infection or worsening Doppler findings.  - Infectious workup negative; currently VSS, afebrile, WBC 8.6   - S/p 7D latency antibiotics     #) Fetal growth restriction with history of abnormal UA Doppler studies   - Doppler studies last measured , normal at that time  - Will plan to repeat BPP and Dopplers on     #) FWB:   - FHT reassuring and appropriate for gestational age, continue TID monitoring, PRN with fetal/maternal indications  - S/p BMZ (17-17), (Rescue 17-17)  - IV Magnesium indicated for neuroprotection if delivery is imminent before 32 weeks  - S/p NICU consult  - Evaluation of fetal growth every 2 weeks given FGR   - Continue twice weekly BPP and UA Doppler Mon/Thur, or more frequently if otherwise clinically indicated      #) Tobacco use:  - Nicotine gum ordered PRN      #) PNC:   - Rh positive; Rubella immune; GBS positive, HIV NR, GC/Chlam NR, RPR NR, Hep B sAg NR. GCT 90.   - SW following  - s/p Flu and Tdap  - Continue daily PNV      #) Mode of delivery:  - Expectant management until 34w or indication for delivery including intra-amniotic infection, abruption, fetal indications, labor, or worsening Doppler studies.  - Breech on last US, candidate for  if cephalic and reassuring maternal/fetal status  - If requires CS, patient desires BTL at time of CS, Private insurance. S/p consent for BTL and  upon admission.    #) Marginal abruption:  - Clinically stable with no signs of active bleeding, labor or non-reassuring fetal status.  - Reassess the area of suspected hemorrhage on 17  - Rh positive      #) Dispo:  - Inpatient  "until delivery and postpartum recovery    Aziza Fang MD  OBGYN PGY-3  [phone]  7:25 AM 2017      Maternal-Fetal Medicine Attending Addendum    Late entry, patient seen several hours ago.      I have discussed the care of Ms. Crouch with the medical student/resident/fellow during morning rounds.  Patient seen & examined by me.  Agree with above, I wish to note the following:      S: Pt denies contractions, VB.  Reports + FM. + LOF, clear    O:  /64  Pulse 82  Temp 98.7  F (37.1  C) (Oral)  Resp 18  Ht 1.727 m (5' 8\")  Wt 108 kg (238 lb 3.2 oz)  LMP  (LMP Unknown)  BMI 36.22 kg/m2  GEN: NAD  FHT: 135 w/ min-moderate variability, +10x10 A, 5:10 am 2 min decel to 90s  TOCO: no contractions  NST interpretation for today: reactive, appropriate for GA    A/P: 27 year old  30w0d admitted with PPROM now also with asymmetric FGR and evidence of abruption on ultrasound.  No current indication for delivery, to remain hospitalized until delivery.  Close maternal and fetal surveillance.  Will re-consult NICU to update patient now that she has been here 22 days.      I spent a total of 15 minutes with Naheed Crocuh, >50% of which was spent in counseling and/or coordination of care.    Date of service (when I saw the patient): 2017      Larissa Jha MD  , OB/GYN  Maternal-Fetal Medicine  [email or web address]  [phone] (Academic office)  [phone] (Pager)       "

## 2017-12-10 PROCEDURE — 25000132 ZZH RX MED GY IP 250 OP 250 PS 637: Performed by: OBSTETRICS & GYNECOLOGY

## 2017-12-10 PROCEDURE — 12000028 ZZH R&B OB UMMC

## 2017-12-10 RX ADMIN — SENNOSIDES AND DOCUSATE SODIUM 1 TABLET: 8.6; 5 TABLET ORAL at 19:59

## 2017-12-10 RX ADMIN — PRENATAL VIT W/ FE FUMARATE-FA TAB 27-0.8 MG 1 TABLET: 27-0.8 TAB at 07:54

## 2017-12-10 RX ADMIN — Medication 1 CAPSULE: at 07:54

## 2017-12-10 NOTE — PROGRESS NOTES
"Maternal Fetal Medicine Daily Note      S: Naheed is feeling well today. Went to NICU yesterday for tour and is feeling less anxious. Feeling fetal movement. No vaginal bleeding. Has leaked more fluid with breech position, continues to be clear. Denies fevers or chills.      O:  /75  Pulse 82  Temp 98.6  F (37  C) (Oral)  Resp 18  Ht 1.727 m (5' 8\")  Wt 108 kg (238 lb 3.2 oz)   BMI 36.22 kg/m2    Gen: NAD, lying in bed comfortably  CV: Regular rate, well perfused  Pulm: Normal respiratory effort  Abd: Gravid, nontender  Ext: Lower extremities without swelling or erythema      FHT: , mod variability, accelerations present, very subtle decel to 120's lasting 3-4 min  [place]: No contractions      Imagin/8/17 OBUS  Cardiac activity: present.  bpm.  Fetal movements: visualized.  Presentation: incomplete breech.  Placenta:  Placental site: anterior, right fundal. Subchorionic hemorrhage (Size 83.1 mm x 23.6 mm x 47.3 mm). Anterior right.  Umbilical cord: previously studied.      AMNIOTIC FLUID ASSESSMENT  ---------------------------------------------------------------------------------------------------------  Amount of AF: Oligohydramnios  MVP 1.8 cm. MAUREEN 4.8 cm. Q1 1.8 cm, Q2 1.5 cm, Q3 0.0 cm, Q4 1.5 cm          BIOPHYSICAL PROFILE  ---------------------------------------------------------------------------------------------------------  2: Fetal breathing movements  2: Gross body movements  2: Fetal tone  0: Amniotic fluid volume  : Biophysical profile score     Assessment/Plan:   Naheed Coruch is a 27 year old  at 30w1d by LMP c/w 7w5d US, admitted as transfer of care from [place] for PPROM at 26w5d. Pregnancy is complicated by history of LEEP, tobacco use, obesity and new onset of FGR, and marginal abruption. Doing well with no s/s of intrauterine infection or labor at this time.      #) PPROM at 26w5d   - Expectant management until 34w, delivery indicated for " fetal/maternal status, labor, infection or worsening Doppler findings.  - Infectious workup negative; currently VSS, afebrile, WBC 8.6   - S/p 7D latency antibiotics      #) Fetal growth restriction with history of abnormal UA Doppler studies   - Doppler studies last measured , normal at that time  - Will plan to repeat BPP and Dopplers on      #) FWB:   - FHT reassuring and appropriate for gestational age, continue TID monitoring, PRN with fetal/maternal indications  - S/p BMZ (17-17), (Rescue 17-17)  - IV Magnesium indicated for neuroprotection if delivery is imminent before 32 weeks  - S/p NICU consult  - Evaluation of fetal growth every 2 weeks given FGR   - Continue twice weekly BPP and UA Doppler Mon/Thur, or more frequently if otherwise clinically indicated      #) Tobacco use:  - Nicotine gum ordered PRN      #) PNC:   - Rh positive; Rubella immune; GBS positive, HIV NR, GC/Chlam NR, RPR NR, Hep B sAg NR. GCT 90.   - SW following  - s/p Flu and Tdap  - Continue daily PNV      #) Mode of delivery:  - Expectant management until 34w or indication for delivery including intra-amniotic infection, abruption, fetal indications, labor, or worsening Doppler studies.  - Breech on last US, candidate for  if cephalic and reassuring maternal/fetal status  - If requires CS, patient desires BTL at time of CS, Private insurance. S/p consent for BTL and  upon admission.     #) Marginal abruption:  - Clinically stable with no signs of active bleeding, labor or non-reassuring fetal status.  - Reassess the area of suspected hemorrhage on 17  - Rh positive      #) Dispo:  - Inpatient until delivery and postpartum recovery     Yanira Cardona PGY3  12/10/2017 10:21 AM     Maternal-Fetal Medicine Attending Addendum    Late entry, patient seen several hours ago.      I have discussed the care of Ms. Crouch with the medical student/resident/fellow during morning  "rounds.  Patient seen & examined by me.  Agree with above, I wish to note the following:      S: Pt denies contractions, VB.  Reports + FM.  + LOF, clear.  Went on a NICU tour and feels better about that.      O:  /64  Pulse 82  Temp 98  F (36.7  C) (Oral)  Resp 18  Ht 1.727 m (5' 8\")  Wt 108 kg (238 lb 3.2 oz)  BMI 36.22 kg/m2  GEN: NAD  ABD: gravid, NT  FHT: 135-140 min-moderate variability, 10x10 A, no D  TOCO: no contractions  NST interpretation for today: reactive, appropriate for GA    A/P: 27 year old  30w1d admitted with PPROM also with more recent diagnosis of asymmetric fetal growth restriction and ultrasound finding of subchorionic hemorrhage.  Clinically stable without any indication for delivery.  Hospitalization until delivery with goal 34 weeks.  Close maternal and fetal surveillance.      I spent a total of 15 minutes with Naheed Crouch, >50% of which was spent in counseling and/or coordination of care.    Date of service (when I saw the patient): December 10, 2017      Larissa Jha MD  , OB/GYN  Maternal-Fetal Medicine  [email or web address]  [phone] (Academic office)  [phone] (Pager)       "

## 2017-12-10 NOTE — PLAN OF CARE
Problem: PROM, PPROM, Prolonged Rupture of Membranes (Adult,Obstetrics,Pediatric)  Goal: Signs and Symptoms of Listed Potential Problems Will be Absent, Minimized or Managed (PROM, PPROM, Prolonged Rupture of Membranes)  Signs and symptoms of listed potential problems will be absent, minimized or managed by discharge/transition of care (reference PROM, PPROM, Prolonged Rupture of Membranes (Adult,Obstetrics,Pediatric) CPG).   Outcome: No Change  Stable, afebrile

## 2017-12-10 NOTE — PLAN OF CARE
"Problem: Patient Care Overview  Goal: Plan of Care/Patient Progress Review  Outcome: No Change  Data:  Maternal status unchanged. Patient continues to report loss of fluid with several moderate \"gushes\" throughout shift. Patient denies bleeding, cramping, headache, vision changes, or RUQ pain. Fetal assessment is WDL (see flowsheet). No contractions visible during monitoring via toco, consistent with patient report. FHR baseline 140 with moderate variability. No declerations.   Action: Continue with plan of care, which is expectant management, and to continue surveillance for changes in maternal or fetal status. Patient encouraged to be up ad theodora.   Response: Patient coping appropriate to situation, support person, FOB, at bedside and supportive.  Patient reports she is starting to get \"sick\" of being here, but is glad she is still pregnant and has not had further complications.       "

## 2017-12-11 ENCOUNTER — HOSPITAL ENCOUNTER (INPATIENT)
Dept: ULTRASOUND IMAGING | Facility: CLINIC | Age: 27
End: 2017-12-11
Attending: OBSTETRICS & GYNECOLOGY
Payer: COMMERCIAL

## 2017-12-11 LAB
ALT SERPL W P-5'-P-CCNC: 19 U/L (ref 0–50)
AST SERPL W P-5'-P-CCNC: 10 U/L (ref 0–45)
CREAT SERPL-MCNC: 0.44 MG/DL (ref 0.52–1.04)
CREAT UR-MCNC: 28 MG/DL
GFR SERPL CREATININE-BSD FRML MDRD: >90 ML/MIN/1.7M2
HGB BLD-MCNC: 11.5 G/DL (ref 11.7–15.7)
PLATELET # BLD AUTO: 270 10E9/L (ref 150–450)
PROT UR-MCNC: 0.08 G/L
PROT/CREAT 24H UR: 0.29 G/G CR (ref 0–0.2)

## 2017-12-11 PROCEDURE — 85049 AUTOMATED PLATELET COUNT: CPT | Performed by: STUDENT IN AN ORGANIZED HEALTH CARE EDUCATION/TRAINING PROGRAM

## 2017-12-11 PROCEDURE — 12000028 ZZH R&B OB UMMC

## 2017-12-11 PROCEDURE — 76820 UMBILICAL ARTERY ECHO: CPT | Performed by: OBSTETRICS & GYNECOLOGY

## 2017-12-11 PROCEDURE — 82565 ASSAY OF CREATININE: CPT | Performed by: STUDENT IN AN ORGANIZED HEALTH CARE EDUCATION/TRAINING PROGRAM

## 2017-12-11 PROCEDURE — 25000132 ZZH RX MED GY IP 250 OP 250 PS 637: Performed by: OBSTETRICS & GYNECOLOGY

## 2017-12-11 PROCEDURE — 36415 COLL VENOUS BLD VENIPUNCTURE: CPT | Performed by: STUDENT IN AN ORGANIZED HEALTH CARE EDUCATION/TRAINING PROGRAM

## 2017-12-11 PROCEDURE — 93010 ELECTROCARDIOGRAM REPORT: CPT | Performed by: INTERNAL MEDICINE

## 2017-12-11 PROCEDURE — 84156 ASSAY OF PROTEIN URINE: CPT | Performed by: OBSTETRICS & GYNECOLOGY

## 2017-12-11 PROCEDURE — 93005 ELECTROCARDIOGRAM TRACING: CPT

## 2017-12-11 PROCEDURE — 85018 HEMOGLOBIN: CPT | Performed by: STUDENT IN AN ORGANIZED HEALTH CARE EDUCATION/TRAINING PROGRAM

## 2017-12-11 PROCEDURE — 25000128 H RX IP 250 OP 636: Performed by: STUDENT IN AN ORGANIZED HEALTH CARE EDUCATION/TRAINING PROGRAM

## 2017-12-11 PROCEDURE — 84450 TRANSFERASE (AST) (SGOT): CPT | Performed by: STUDENT IN AN ORGANIZED HEALTH CARE EDUCATION/TRAINING PROGRAM

## 2017-12-11 PROCEDURE — 76819 FETAL BIOPHYS PROFIL W/O NST: CPT

## 2017-12-11 PROCEDURE — 84460 ALANINE AMINO (ALT) (SGPT): CPT | Performed by: STUDENT IN AN ORGANIZED HEALTH CARE EDUCATION/TRAINING PROGRAM

## 2017-12-11 RX ORDER — HYDRALAZINE HYDROCHLORIDE 20 MG/ML
5 INJECTION INTRAMUSCULAR; INTRAVENOUS ONCE
Status: COMPLETED | OUTPATIENT
Start: 2017-12-11 | End: 2017-12-11

## 2017-12-11 RX ORDER — NIFEDIPINE 10 MG/1
10 CAPSULE ORAL
Status: DISCONTINUED | OUTPATIENT
Start: 2017-12-11 | End: 2017-12-15

## 2017-12-11 RX ORDER — LABETALOL HYDROCHLORIDE 5 MG/ML
40 INJECTION, SOLUTION INTRAVENOUS EVERY 10 MIN PRN
Status: DISCONTINUED | OUTPATIENT
Start: 2017-12-11 | End: 2017-12-15

## 2017-12-11 RX ORDER — LORAZEPAM 2 MG/ML
2 INJECTION INTRAMUSCULAR
Status: DISCONTINUED | OUTPATIENT
Start: 2017-12-11 | End: 2017-12-15

## 2017-12-11 RX ORDER — LABETALOL HYDROCHLORIDE 5 MG/ML
20 INJECTION, SOLUTION INTRAVENOUS EVERY 10 MIN PRN
Status: DISCONTINUED | OUTPATIENT
Start: 2017-12-11 | End: 2017-12-15

## 2017-12-11 RX ORDER — MAGNESIUM SULFATE HEPTAHYDRATE 40 MG/ML
4 INJECTION, SOLUTION INTRAVENOUS
Status: DISCONTINUED | OUTPATIENT
Start: 2017-12-11 | End: 2017-12-15

## 2017-12-11 RX ORDER — LABETALOL HYDROCHLORIDE 5 MG/ML
80 INJECTION, SOLUTION INTRAVENOUS EVERY 10 MIN PRN
Status: DISCONTINUED | OUTPATIENT
Start: 2017-12-11 | End: 2017-12-15

## 2017-12-11 RX ORDER — MAGNESIUM SULFATE HEPTAHYDRATE 500 MG/ML
4 INJECTION, SOLUTION INTRAMUSCULAR; INTRAVENOUS
Status: DISCONTINUED | OUTPATIENT
Start: 2017-12-11 | End: 2017-12-15

## 2017-12-11 RX ADMIN — HYDRALAZINE HYDROCHLORIDE 5 MG: 20 INJECTION INTRAMUSCULAR; INTRAVENOUS at 05:45

## 2017-12-11 RX ADMIN — SENNOSIDES AND DOCUSATE SODIUM 1 TABLET: 8.6; 5 TABLET ORAL at 20:48

## 2017-12-11 RX ADMIN — Medication 1 CAPSULE: at 09:45

## 2017-12-11 RX ADMIN — SENNOSIDES AND DOCUSATE SODIUM 1 TABLET: 8.6; 5 TABLET ORAL at 09:44

## 2017-12-11 RX ADMIN — PRENATAL VIT W/ FE FUMARATE-FA TAB 27-0.8 MG 1 TABLET: 27-0.8 TAB at 09:44

## 2017-12-11 NOTE — PROVIDER NOTIFICATION
12/11/17 0534   Provider Notification   Provider Name/Title Dr. Story   Method of Notification Phone   Notification Reason Maternal Vital Sign Change  (med ordered, plan to see pt)   Severe Range BP x2, pt denies pre-e symptoms. Dr. Story to order BP med & HELLP labs, to see pt.

## 2017-12-11 NOTE — PROGRESS NOTES
Antepartum interim note  2017, 5:52 AM     At bedside for BP now in 160s-170s/80s. HR 50-60s. Naheed says she feels nervous for her US today, but otherwise denies headache, blurry vision, chest pain, RUQ/epigastric pain, nausea/vomiting. Has not noticed new/increased edema. UOP subjectively normal.     Patient Vitals for the past 12 hrs:   BP Temp Temp src Resp SpO2   17 0530 (!) 167/95 - - - -   17 0529 (!) 174/92 - - - -   17 0511 (!) 161/93 - - - -   17 0510 (!) 155/94 - - - 100 %   17 0500 149/80 - - - -   12/10/17 2329 125/65 98.7  F (37.1  C) Oral 18 -   12/10/17 1955 127/70 98.1  F (36.7  C) Oral 18 -     Gen: appears nervous, lying comfortably in bed  Heart: RRR  Lungs: CTAB  Abdo: soft, nontender including RUQ  Reflexes: 3+ UE and LE, no clonus  Extr: 1+ LE edema, none in hands/face    A/P  27 year old  at 30w2d admitted following PPROm at 26w5d, now with severe range blood pressures. Previously a single mild range BP.     Concerning for new preeclampsia with severe features, which may explain growth restriction. Will treat emergently with 5 mg IV hydralazine (given HR 50s-60s) and reevaluate in 20 mins as per protocol.     HELLP labs ordered, pending.     As she is asymptomatic, wait to start Mg until HELLP labs return abnormally or symptoms develop.     Discussed with Dr. Jha.     Rose Story, PGY3  OB/Gyn  2017, 5:51 AM

## 2017-12-11 NOTE — PROGRESS NOTES
Maternal Fetal Medicine Daily Note      S: Naheed is feeling well today.Reports normal fetal movement. No vaginal bleeding. Denies chills, cramping. Vaginal discharge is unchanged. Denies HA, vision changes, SOB, CP.     O:  Vitals:    17 0652 17 0733 17 0849 17 0951   BP: 137/79 117/74 135/68 115/66   Pulse:       Resp:       Temp:    97.6  F (36.4  C)   TempSrc:    Oral   SpO2:       Weight:       Height:         Gen: NAD, lying in bed comfortably  CV: Regular rate  Pulm: Normal respiratory effort  Abd: Gravid, nontender  Ext: Lower extremities 1+ edema, 3+ patellar DTR's, no beats clonus      FHT: , mod variability, accelerations present, absent decelerations  [place]: No contractions      Imaging:   BPP : MAUREEN 2.0, 2x2 cm pocket present, BPP 8/8, small subchorionic hemorrhage 54.5mm x 23.8mm, complete breech    Hemoglobin   Date Value Ref Range Status   2017 11.5 (L) 11.7 - 15.7 g/dL Final   2017 10.8 (L) 11.7 - 15.7 g/dL Final   2017 10.9 (L) 11.7 - 15.7 g/dL Final   2017 11.1 (L) 11.7 - 15.7 g/dL Final     Platelet Count   Date Value Ref Range Status   2017 270 150 - 450 10e9/L Final   2017 288 150 - 450 10e9/L Final   2017 278 150 - 450 10e9/L Final     AST   Date Value Ref Range Status   2017 10 0 - 45 U/L Final     ALT   Date Value Ref Range Status   2017 19 0 - 50 U/L Final     Creatinine   Date Value Ref Range Status   2017 0.44 (L) 0.52 - 1.04 mg/dL Final   UPC 0.29    Assessment/Plan:   Naheed Crouch is a 27 year old  at 30w2d by LMP c/w 7w5d US, admitted as transfer of care from [place] for PPROM at 26w5d. Pregnancy is complicated by history of LEEP, tobacco use, obesity, IUGR and marginal abruption. Now with gestational hypertension and severe range blood pressures this morning requiring IV hydralazine.      PPROM at 26w5d   - Expectant management until 34w, delivery indicated for fetal/maternal  status, labor, infection  - Infectious workup negative; currently VSS, afebrile, WBC 8.6   - S/p 7D latency antibiotics     GHTN:  - UPC 0.29, does not meet pre eclampsia criteria at this time  - No indications for magnesium at this time given isolated severe pressures, however magnesium indicated with severe symptoms or ongoing uncontrolled severe range blood pressures  - HELLP nl, repeat as clinically indicated   - Notify MD with 160/110 sustained, IV hydralazine or labetalol for sustained severe range blood pressures     FWB:   - FHT reassuring and appropriate for gestational age, continue TID monitoring, PRN with fetal/maternal indications  - S/p BMZ (17-17) and Rescue (17-17)  - IV Magnesium indicated for neuroprotection if delivery is imminent before 32 weeks  - S/p NICU consult  - Continue twice weekly BPP with UA doppler for IUGR, more frequently as clinically indicated  - q2w growth scans, last  EFW 20% 1089g, AC<3% w/ marginal abruption  - Continue twice weekly BPP and UA Doppler Mon/Thur, or more frequently if otherwise clinically indicated      Tobacco use:  - s/p patch taper  - Nicotine gum ordered PRN      PNC:   - Rh positive; Rubella immune; GBS positive, HIV NR, GC/Chlam NR, RPR NR, Hep B sAg NR. GCT 90.   - SW following  - s/p Flu and Tdap  - Continue daily PNV      Mode of delivery:  - Expectant management until 34w or indication for delivery including intra-amniotic infection, abruption, fetal indications, labor, or persistent reversal of UA dopplers  - Breech on last US, candidate for  if cephalic and reassuring maternal/fetal status  - If requires CS, patient desires BTL at time of CS, Private insurance. S/p consent for BTL and  upon admission.     Marginal abruption:  - Clinically stable with no signs of active bleeding, labor or non-reassuring fetal status.  - Rh positive, rhogam not indicated      Dispo:  - Inpatient until delivery and postpartum  "recovery     Criselda Azul MD  OB GYN PGY-3    Maternal-Fetal Medicine Attending Addendum    Late entry, patient seen several hours ago.      I have discussed the care of Ms. Crouch with the medical student/resident/fellow during morning rounds.  Patient seen & examined by me.  Agree with above, I wish to note the following:      S: Pt denies contractions, VB.  Reports + FM.  Still with clear LOF.      O:  /76  Pulse 82  Temp 97.6  F (36.4  C) (Oral)  Resp 18  Ht 1.727 m (5' 8\")  Wt 108 kg (238 lb 3.2 oz) SpO2 100%  BMI 36.22 kg/m2  GEN: NAD  ABD: gravid, NT  FHT: 135 w/ moderate variability, + A, no D  TOCO: no contractions  NST interpretation for today: reactive, appropriate for GA    A/P: 27 year old  30w2d admitted with PPROM also with more recent diagnosis of asymmetric fetal growth restriction and ultrasound finding of subchorionic hemorrhage.   Normal BPP and Dopplers today with smaller subchorionic hemorrhage.  Now meets criteria for gestational hypertension, required IV medication for severe range.  If further severe range blood pressure will need repeat set of labs with assessment of proteinuria.  Clinically stable without indication for delivery.  Hospitalization until delivery.  Close maternal and fetal surveillance.  Note made of maternal HR <50, will obtain EKG and TSH.      I spent a total of 15 minutes with Naheed Crouch, >50% of which was spent in counseling and/or coordination of care.    Date of service (when I saw the patient): 2017      Larissa Jha MD  , OB/GYN  Maternal-Fetal Medicine  [email or web address]  [phone] (Academic office)  [phone] (Pager)       "

## 2017-12-11 NOTE — PLAN OF CARE
Problem: PROM, PPROM, Prolonged Rupture of Membranes (Adult,Obstetrics,Pediatric)  Goal: Signs and Symptoms of Listed Potential Problems Will be Absent, Minimized or Managed (PROM, PPROM, Prolonged Rupture of Membranes)  Signs and symptoms of listed potential problems will be absent, minimized or managed by discharge/transition of care (reference PROM, PPROM, Prolonged Rupture of Membranes (Adult,Obstetrics,Pediatric) CPG).   Outcome: No Change  Pt. Continues to not have UC.  She also does not have any abd pain, changes in fluid color or odor, no bleeding.  She is nervous about her severe range blood pressure this am.  Reassured we will continue to assess her.  She verbalizes understanding on when to update the nurse with pre-eclampsia symptoms.

## 2017-12-11 NOTE — PLAN OF CARE
Problem: Patient Care Overview  Goal: Plan of Care/Patient Progress Review  Outcome: No Change  Patient stable. Denies  labor signs. FHR category 1. Continue with plan of care.

## 2017-12-11 NOTE — PLAN OF CARE
Problem: PROM, PPROM, Prolonged Rupture of Membranes (Adult,Obstetrics,Pediatric)  Goal: Signs and Symptoms of Listed Potential Problems Will be Absent, Minimized or Managed (PROM, PPROM, Prolonged Rupture of Membranes)  Signs and symptoms of listed potential problems will be absent, minimized or managed by discharge/transition of care (reference PROM, PPROM, Prolonged Rupture of Membranes (Adult,Obstetrics,Pediatric) CPG).   Outcome: Improving  Pt stable, elevated BPs. Pt denies headache, vision changes, epigastric pain. Severe range BP x2, Dr. Story notified and aware, hydralazing 5mg given & continued to monitor BP q5min until in 130s/70s. Pt up to u/s and /74 upon return to room - will continue to monitor. Leaking small amounts clear fluid, no bleeding. FHR appropriate for gestational age. No contractions.

## 2017-12-12 LAB
INTERPRETATION ECG - MUSE: NORMAL
TSH SERPL DL<=0.005 MIU/L-ACNC: 2.47 MU/L (ref 0.4–4)

## 2017-12-12 PROCEDURE — 36415 COLL VENOUS BLD VENIPUNCTURE: CPT | Performed by: OBSTETRICS & GYNECOLOGY

## 2017-12-12 PROCEDURE — 12000028 ZZH R&B OB UMMC

## 2017-12-12 PROCEDURE — 25000132 ZZH RX MED GY IP 250 OP 250 PS 637: Performed by: OBSTETRICS & GYNECOLOGY

## 2017-12-12 PROCEDURE — 84443 ASSAY THYROID STIM HORMONE: CPT | Performed by: OBSTETRICS & GYNECOLOGY

## 2017-12-12 RX ADMIN — SENNOSIDES AND DOCUSATE SODIUM 1 TABLET: 8.6; 5 TABLET ORAL at 09:05

## 2017-12-12 RX ADMIN — PRENATAL VIT W/ FE FUMARATE-FA TAB 27-0.8 MG 1 TABLET: 27-0.8 TAB at 09:05

## 2017-12-12 RX ADMIN — SENNOSIDES AND DOCUSATE SODIUM 1 TABLET: 8.6; 5 TABLET ORAL at 19:57

## 2017-12-12 RX ADMIN — Medication 1 CAPSULE: at 09:05

## 2017-12-12 NOTE — PLAN OF CARE
Problem: Patient Care Overview  Goal: Plan of Care/Patient Progress Review  Outcome: No Change  AFVSS. Abdomen soft, non-tender to palpation. Denies contractions, backache, bleeding or pelvic pressure. States leaking a moderate amount of clear fluid. FHT reactive, no contractions seen on monitor. Also denies HA, blurred vision or epigastric pain. Reflexes 3+, no clonus. Trace edema in LEs. Appears stable at this time. Continue to monitor for s/s infection/ptl/preeclampsia. Continue present cares.

## 2017-12-12 NOTE — PLAN OF CARE
Problem: Hypertensive Disorders in Pregnancy (Adult,Obstetrics,Pediatric)  Goal: Signs and Symptoms of Listed Potential Problems Will be Absent, Minimized or Managed (Hypertensive Disorders in Pregnancy)  Signs and symptoms of listed potential problems will be absent, minimized or managed by discharge/transition of care (reference Hypertensive Disorders in Pregnancy (Adult,Obstetrics,Pediatric) CPG).   Outcome: No Change  Pt stable this shift.  BPs wdl, denies HA, vision changes, epigastric pain.  Continues to leak moderate amount of clear fluid.  FHT appropriate for GA.  During rounding, discussed with Dr Jha and Dr Azul whether pt needs to monitor I/O as she strongly prefers not to, per MDs okay to discontinue I/O as long as we continue to do daily weights.  Pt down to cafeteria for lunch.  Plan to continue with current care plan.

## 2017-12-12 NOTE — PROGRESS NOTES
Maternal Fetal Medicine Daily Note      S: Naheed is feeling well today. Reports normal fetal movement. No vaginal bleeding. Denies chills, cramping. Vaginal discharge is unchanged. Denies HA, vision changes, SOB, CP.     O:  Vitals:    17 2114 17 0145 17 0604 17 0904   BP: 118/67 127/73 131/79 130/74   Pulse:       Resp: 18 18 18 16   Temp: 97.7  F (36.5  C) 98.3  F (36.8  C) 98.3  F (36.8  C) 98.1  F (36.7  C)   TempSrc: Oral Oral Oral Oral   SpO2:       Weight:   115.7 kg (255 lb 1.6 oz)    Height:       HR: 70-80  Gen: NAD, lying in bed comfortably  CV: Regular rate  Pulm: Normal respiratory effort  Abd: Gravid, nontender  Ext: Lower extremities 1+ edema, 3+ patellar DTR's, no beats clonus      FHT: , mod variability, accelerations present, absent decelerations  [place]: No contractions      Imaging:   BPP : MAUREEN 2.0, 2x2 cm pocket present, BPP 8/8, small subchorionic hemorrhage 54.5mm x 23.8mm, complete breech    Hemoglobin   Date Value Ref Range Status   2017 11.5 (L) 11.7 - 15.7 g/dL Final   2017 10.8 (L) 11.7 - 15.7 g/dL Final   2017 10.9 (L) 11.7 - 15.7 g/dL Final   2017 11.1 (L) 11.7 - 15.7 g/dL Final     Platelet Count   Date Value Ref Range Status   2017 270 150 - 450 10e9/L Final   2017 288 150 - 450 10e9/L Final   2017 278 150 - 450 10e9/L Final     AST   Date Value Ref Range Status   2017 10 0 - 45 U/L Final     ALT   Date Value Ref Range Status   2017 19 0 - 50 U/L Final     Creatinine   Date Value Ref Range Status   2017 0.44 (L) 0.52 - 1.04 mg/dL Final   UPC 0.29    Assessment/Plan:   Naheed Crouch is a 27 year old  at 30w3d by LMP c/w 7w5d US, admitted as transfer of care from [place] for PPROM at 26w5d. Pregnancy is complicated by history of LEEP, tobacco use, obesity, IUGR and marginal abruption.       PPROM at 26w5d   - Expectant management until 34w, delivery indicated for fetal/maternal  status, labor, infection  - Infectious workup negative; currently VSS, afebrile, WBC 8.6   - S/p 7D latency antibiotics     Bradycardia:  - Resolved, nl EKG and TSH    GHTN:  - UPC 0.29, does not meet pre eclampsia criteria at this time  - No indications for magnesium at this time given isolated severe pressures, however magnesium indicated with severe symptoms or ongoing uncontrolled severe range blood pressures  - HELLP nl, repeat as clinically indicated   - Notify MD with 160/110 sustained, IV hydralazine or labetalol for sustained severe range blood pressures     FWB:   - FHT reassuring and appropriate for gestational age, continue TID monitoring, PRN with fetal/maternal indications  - S/p BMZ (17-17) and Rescue (17-17)  - IV Magnesium indicated for neuroprotection if delivery is imminent before 32 weeks  - S/p NICU consult  - Continue twice weekly BPP with UA doppler for IUGR, more frequently as clinically indicated  - q2w growth scans, last  EFW 20% 1089g, AC<3% w/ marginal abruption  - Continue twice weekly BPP and UA Doppler Mon/Thur, or more frequently if otherwise clinically indicated      Tobacco use:  - s/p patch taper  - Nicotine gum ordered PRN      PNC:   - Rh positive; Rubella immune; GBS positive, HIV NR, GC/Chlam NR, RPR NR, Hep B sAg NR. GCT 90.   - SW following  - s/p Flu and Tdap  - Continue daily PNV      Mode of delivery:  - Expectant management until 34w or indication for delivery including intra-amniotic infection, abruption, fetal indications, labor, or persistent reversal of UA dopplers prior to 32w  - Breech on last US, candidate for  if cephalic and reassuring maternal/fetal status  - If requires CS, patient desires BTL at time of CS, Private insurance. S/p consent for BTL and  upon admission.     Marginal abruption:  - Clinically stable with no signs of active bleeding, labor or non-reassuring fetal status.  - Rh positive, rhogam not  "indicated      Dispo:  - Inpatient until delivery and postpartum recovery     Criselda Azul MD  OB GYN PGY-3  2017  11:40 AM    Maternal-Fetal Medicine Attending Addendum    Late entry, patient seen several hours ago.      I have discussed the care of Ms. Crouch with the medical student/resident/fellow during morning rounds.  Patient seen & examined by me.  Agree with above, I wish to note the following:      S: Pt denies contractions, VB.  Reports + FM.    O:  /74 (118-131/67-79)  Pulse 82  Temp 98.1  F (36.7  C) (Oral)  Resp 16  Ht 1.727 m (5' 8\")  Wt 115.7 kg (255 lb 1.6 oz)  LMP 2017  SpO2 100%  BMI 38.79 kg/m2  GEN: NAD  FHT: 140 w/ moderate variability, + A, no D  TOCO: no contractions  NST interpretation for today: reasssuring/appropriate for GA    A/P: 27 year old  30w3d admitted with PPROM also with more recent diagnosis of asymmetric fetal growth restriction and ultrasound finding of subchorionic hemorrhage.  Now also with gestational hypertension.  If further severe range blood pressure will need repeat set of labs with assessment of proteinuria.  Clinically stable without indication for delivery.  Hospitalization until delivery.  Close maternal and fetal surveillance.       I spent a total of 15 minutes with Naheed Crouch, >50% of which was spent in counseling and/or coordination of care.    Date of service (when I saw the patient): 2017      Larissa Jha MD  , OB/GYN  Maternal-Fetal Medicine  [email or web address]  [phone] (Academic office)  [phone] (Pager)         "

## 2017-12-12 NOTE — PLAN OF CARE
Problem: Patient Care Overview  Goal: Plan of Care/Patient Progress Review  Outcome: No Change  Patient is very anxious about having her blood pressure taken as it was elevated this morning and had to be treated. This was new for her since her admission. Pre-eclamptic labs were normal. New diagnosis of gestational hypertension. Continue to offer support and reassurance.

## 2017-12-12 NOTE — PLAN OF CARE
Problem: Patient Care Overview  Goal: Plan of Care/Patient Progress Review  Outcome: No Change  AFVSS. Abdomen soft, non-tender to palpation. Denies contractions/cramping, backache, bleeding or pelvic pressure. Continues to leak small to moderate amount of clear fluid. Patient also denies HA, blurred vision or epigastric pain. BPs within parameters. Appears stable at this time. Continue to monitor for s/s infection/ptl/preeclampsia. Continue present cares.

## 2017-12-13 PROCEDURE — 25000132 ZZH RX MED GY IP 250 OP 250 PS 637: Performed by: OBSTETRICS & GYNECOLOGY

## 2017-12-13 PROCEDURE — 0W9N0ZZ DRAINAGE OF FEMALE PERINEUM, OPEN APPROACH: ICD-10-PCS | Performed by: OBSTETRICS & GYNECOLOGY

## 2017-12-13 PROCEDURE — 12000028 ZZH R&B OB UMMC

## 2017-12-13 RX ORDER — CLINDAMYCIN HCL 300 MG
300 CAPSULE ORAL EVERY 8 HOURS SCHEDULED
Status: DISCONTINUED | OUTPATIENT
Start: 2017-12-13 | End: 2017-12-15

## 2017-12-13 RX ADMIN — CLINDAMYCIN HYDROCHLORIDE 300 MG: 300 CAPSULE ORAL at 16:24

## 2017-12-13 RX ADMIN — CLINDAMYCIN HYDROCHLORIDE 300 MG: 300 CAPSULE ORAL at 21:50

## 2017-12-13 RX ADMIN — PRENATAL VIT W/ FE FUMARATE-FA TAB 27-0.8 MG 1 TABLET: 27-0.8 TAB at 08:36

## 2017-12-13 RX ADMIN — SENNOSIDES AND DOCUSATE SODIUM 1 TABLET: 8.6; 5 TABLET ORAL at 08:36

## 2017-12-13 RX ADMIN — Medication 1 CAPSULE: at 08:36

## 2017-12-13 RX ADMIN — SENNOSIDES AND DOCUSATE SODIUM 1 TABLET: 8.6; 5 TABLET ORAL at 20:24

## 2017-12-13 NOTE — PLAN OF CARE
Problem: Patient Care Overview  Goal: Plan of Care/Patient Progress Review  Outcome: Improving  B/Ps WNL. Afebrile. Reports leaking a small amount of clear fluid. Denies any foul odor to the fluid. Denied vaginal bleeding, abdominal tenderness, N/V, or any s/s of infection. Denies preeclamptic symptoms. FHTs appropriate for gestational age. Uterus quiet on toco. Continue to monitor for any s/s of maternal/fetal compromise.

## 2017-12-13 NOTE — PROGRESS NOTES
"Maternal-Fetal Medicine Attending     Late entry, patient seen several hours ago.      S: Pt denies contractions, VB.  Reports + FM.  Just clear LOF.      O:  /70 (high of 136/73)  Pulse 82  Temp 97.8  F (36.6  C) (Oral)  Resp 16  Ht 1.727 m (5' 8\")  Wt 115.9 kg (255 lb 9.6 oz) SpO2 100%  BMI 38.86 kg/m2  GEN: NAD  ABD: gravid, NT, no fundal tenderness  FHT: 125 w/ moderate variability, + A, no D  TOCO: no contractions  NST interpretation for today: reasssuring/appropriate for GA    A/P: 27 year old  30w4d admitted with PPROM.  No signs/symptoms of chorioamnionitis or labor.  Pregnancy also complicated by asymmetric fetal growth restriction and gestational HTN  1. PPROM  - continue to monitor for signs/symptoms of chorioamnionitis, labor, abruption, cord prolapse  - s/p latency antibiotics  2. Fetal well-being  - s/p betamethasone, including rescue course  and   - last growth ultrasound , plan to repeat   - twice weekly BPP and Dopplers  - qshift monitoring and prn  - appreciate NICU consultation  - magnesium sulfate if delivery <32 weeks  3. gHTN  - monitor maternal blood pressure and symptoms  - repeat PEC labs prn  4. Routine OB  - continue prenatal vitamin  - monitor maternal weights  4. Mode of delivery  - breech, would require   - GBS positive  5. Dispo  - pt to remain hospitalized until delivery, goal 34 weeks unless indicated sooner  - pt agrees with plan of care and all questions answered    I spent a total of 15 minutes with Naheed Crouch, >50% of which was spent in counseling and/or coordination of care.    Date of service (when I saw the patient): 2017      Larissa Jha MD  , OB/GYN  Maternal-Fetal Medicine  [email or web address]  [phone] (Academic office)  [phone] (Pager)       "

## 2017-12-13 NOTE — PLAN OF CARE
Problem: Patient Care Overview  Goal: Plan of Care/Patient Progress Review  Outcome: Improving  Patient stable. Continue with plan of care.    Problem: PROM, PPROM, Prolonged Rupture of Membranes (Adult,Obstetrics,Pediatric)  Goal: Signs and Symptoms of Listed Potential Problems Will be Absent, Minimized or Managed (PROM, PPROM, Prolonged Rupture of Membranes)  Signs and symptoms of listed potential problems will be absent, minimized or managed by discharge/transition of care (reference PROM, PPROM, Prolonged Rupture of Membranes (Adult,Obstetrics,Pediatric) CPG).   Outcome: No Change  No s/s infection or PTL. Leaking clear fluid. No contractions. FHR cat 1.     Problem: Hypertensive Disorders in Pregnancy (Adult,Obstetrics,Pediatric)  Goal: Signs and Symptoms of Listed Potential Problems Will be Absent, Minimized or Managed (Hypertensive Disorders in Pregnancy)  Signs and symptoms of listed potential problems will be absent, minimized or managed by discharge/transition of care (reference Hypertensive Disorders in Pregnancy (Adult,Obstetrics,Pediatric) CPG).   Outcome: Improving  BPs 110-120s/60-70s. Denies s/s pre-eclampsia. Reflexes normal. 1 beat of clonus bilaterally. No swelling. Continue daily weights.

## 2017-12-14 ENCOUNTER — HOSPITAL ENCOUNTER (INPATIENT)
Dept: ULTRASOUND IMAGING | Facility: CLINIC | Age: 27
End: 2017-12-14
Attending: OBSTETRICS & GYNECOLOGY
Payer: COMMERCIAL

## 2017-12-14 PROCEDURE — 25000132 ZZH RX MED GY IP 250 OP 250 PS 637: Performed by: OBSTETRICS & GYNECOLOGY

## 2017-12-14 PROCEDURE — 12000028 ZZH R&B OB UMMC

## 2017-12-14 PROCEDURE — 76819 FETAL BIOPHYS PROFIL W/O NST: CPT

## 2017-12-14 PROCEDURE — 76820 UMBILICAL ARTERY ECHO: CPT | Performed by: OBSTETRICS & GYNECOLOGY

## 2017-12-14 RX ADMIN — SENNOSIDES AND DOCUSATE SODIUM 1 TABLET: 8.6; 5 TABLET ORAL at 20:21

## 2017-12-14 RX ADMIN — PRENATAL VIT W/ FE FUMARATE-FA TAB 27-0.8 MG 1 TABLET: 27-0.8 TAB at 07:59

## 2017-12-14 RX ADMIN — CLINDAMYCIN HYDROCHLORIDE 300 MG: 300 CAPSULE ORAL at 22:00

## 2017-12-14 RX ADMIN — CLINDAMYCIN HYDROCHLORIDE 300 MG: 300 CAPSULE ORAL at 06:02

## 2017-12-14 RX ADMIN — Medication 1 CAPSULE: at 07:58

## 2017-12-14 RX ADMIN — CLINDAMYCIN HYDROCHLORIDE 300 MG: 300 CAPSULE ORAL at 14:32

## 2017-12-14 RX ADMIN — SENNOSIDES AND DOCUSATE SODIUM 1 TABLET: 8.6; 5 TABLET ORAL at 07:59

## 2017-12-14 NOTE — PLAN OF CARE
Problem: PROM, PPROM, Prolonged Rupture of Membranes (Adult,Obstetrics,Pediatric)  Goal: Signs and Symptoms of Listed Potential Problems Will be Absent, Minimized or Managed (PROM, PPROM, Prolonged Rupture of Membranes)  Signs and symptoms of listed potential problems will be absent, minimized or managed by discharge/transition of care (reference PROM, PPROM, Prolonged Rupture of Membranes (Adult,Obstetrics,Pediatric) CPG).   Outcome: Therapy, progress toward functional goals as expected  Pt has no complaints today. S&S of pre-e not present. FHR reactive and no contractions. Antibiotics for abscess that was drained yesterday. Pt to do sitz bath BID. Cont. Antepartum POC.

## 2017-12-14 NOTE — PLAN OF CARE
Problem: Patient Care Overview  Goal: Plan of Care/Patient Progress Review  Outcome: Improving  VSS. No s/s of PTL. FHR cat 1. No contractions. Folliculitis draining scant amount of blood, collected on pad she wears for amniotic fluid leaking. Per Dr. Hurley no need to cover with gauze. Educated patient on perineal hygiene, encouraged tub soak twice daily. PO clindamycin TID started. Denies pain from area. Continue with plan of care.

## 2017-12-14 NOTE — PROGRESS NOTES
"Maternal Fetal Medicine Daily Note      S: Naheed is feeling well today - \"much better\" than yesterday. Reports normal fetal movement. No vaginal bleeding. Denies chills, fever, cramping. Leakage of fluid is unchanged.     O:  Vitals:    17 2019 17 2340 17 0410 17 0757   BP: 119/67 101/54 115/66 128/76   Pulse:       Resp:     Temp: 98.4  F (36.9  C) 98.2  F (36.8  C) 98  F (36.7  C)    TempSrc: Oral Oral Oral    SpO2:       Weight:    116.1 kg (255 lb 14.4 oz)   Height:         HR: 70-80  Gen: NAD, lying in bed comfortably  Abd: Gravid, nontender  Ext: Lower extremities 1+ edema      FHT: 120s, mod annabelle, accels present, decels absent  [place]: No contractions      Imagin/14: BPP 6/8. MAUREEN 1.0, breech, normal umbillical artery S/D ratio     Assessment/Plan:   Naheed Crouch is a 27 year old  at 30w5d by LMP c/w 7w5d US, admitted as transfer of care from [place] for PPROM at 26w5d. Diagnosed with IUGR, GHTN and marginal abruption this admission. Pregnancy is also complicated by history of LEEP, tobacco use, obesity.      PPROM at 26w5d   - Expectant management until 34w, delivery indicated for fetal/maternal status, labor, infection  - Infectious workup negative; currently VSS, afebrile, WBC 8.6   - S/p 7D latency antibiotics     GHTN:  - UPC 0.29, does not meet pre eclampsia criteria at this time  - No indications for magnesium at this time given isolated severe pressures, however magnesium indicated with severe symptoms or ongoing uncontrolled severe range blood pressures  - HELLP nl, repeat as clinically indicated   - Notify MD with 160/110 sustained, IV hydralazine or labetalol for sustained severe range blood pressures     FWB:   - FHT reassuring and appropriate for gestational age, continue TID monitoring, PRN with fetal/maternal indications  - S/p BMZ (17-17) and Rescue (17-17)  - IV Magnesium indicated for neuroprotection if delivery " "is imminent before 32 weeks  - S/p NICU consult  - q2w growth scans, last  EFW 20% 1089g, AC<3% w/ marginal abruption  - Continue twice weekly BPP and UA Doppler Mon/Thur, or more frequently if otherwise clinically indicated      Tobacco use:  - s/p patch taper  - Nicotine gum ordered PRN      PNC:   - Rh positive; Rubella immune; GBS positive, HIV NR, GC/Chlam NR, RPR NR, Hep B sAg NR. GCT 90.   - SW following  - s/p Flu and Tdap  - Continue daily PNV    Perineal abscess:  - s/p I&D , continue clindamycin D#      Mode of delivery:  - Expectant management until 34w or indication for delivery including intra-amniotic infection, abruption, fetal indications, labor, or persistent reversal of UA dopplers prior to 32w  - Breech on today's US, aware that delivery will likely be , however candidate for  if cephalic and reassuring maternal/fetal status.  - If requires CS, patient desires BTL at time of CS, Private insurance. S/p consent for BTL and  upon admission.     Marginal abruption:  - Clinically stable with no signs of active bleeding, labor or non-reassuring fetal status.  - Rh positive, rhogam not indicated      Dispo:  - Inpatient until delivery and postpartum recovery     Criselda Azul MD  OB GYN PGY-3  2017  10:49 AM    Maternal-Fetal Medicine Attending Addendum    Late entry, patient seen several hours ago.      I have discussed the care of Ms. Crouch with the medical student/resident/fellow during morning rounds.  Patient seen & examined by me.  Agree with above, I wish to note the following:      S: Pt denies contractions, VB.  Reports + FM.  Still just clear LOF.      O:  /76  Pulse 82  Temp 98  F (36.7  C) (Oral)  Resp 16  Ht 1.727 m (5' 8\")  Wt 116.1 kg (255 lb 14.4 oz)  SpO2 100%  BMI 38.91 kg/m2  GEN: NAD  ABD: gravid, NT, no fundal tenderness  FHT: 125 w/ moderate variability, + A, no D  TOCO: no contractions  NST interpretation for today: " reasssuring/appropriate for GA    A/P: 27 year old  30w5d admitted with PPROM.  No signs or symptoms of chorioamnionitis.  Pregnancy also complicated by asymmetric fetal growth restriction and gestational HTN.  No contraindications to expectant management.  Continue hospitalization until planned delivery at 34 weeks unless earlier delivery indicated.  Twice weekly Dopplers and every two week growth ultrasounds.  Continue to monitor maternal blood pressure.  Repeat labs prn.  Breech would require .      I spent a total of 15 minutes face-to-face or coordinating care of Naheed Crouch.  More than 50% of my time on the unit was spent counseling and/or coordinating care.    Date of service (when I saw the patient): 2017      Larissa Jha MD  , OB/GYN  Maternal-Fetal Medicine  [email or web address]  [phone] (Academic office)  [phone] (Pager)

## 2017-12-14 NOTE — PLAN OF CARE
Problem: PROM, PPROM, Prolonged Rupture of Membranes (Adult,Obstetrics,Pediatric)  Goal: Signs and Symptoms of Listed Potential Problems Will be Absent, Minimized or Managed (PROM, PPROM, Prolonged Rupture of Membranes)  Signs and symptoms of listed potential problems will be absent, minimized or managed by discharge/transition of care (reference PROM, PPROM, Prolonged Rupture of Membranes (Adult,Obstetrics,Pediatric) CPG).   Outcome: No Change  VSS. Afebrile. +FM. Denies cramping, vaginal bleeding, contractions, pain. Continues to leak clear fluid. Scant amount of blood on pad from incision and drainage. Clindamycin given (see MAR). Unable to sleep after 0400 VS. IV saline locked. See flowsheets for contraction and FHR patterns. Denies needs or concerns. Continue with plan of care.

## 2017-12-15 ENCOUNTER — ANESTHESIA (OUTPATIENT)
Dept: OBGYN | Facility: CLINIC | Age: 27
End: 2017-12-15
Payer: COMMERCIAL

## 2017-12-15 ENCOUNTER — SURGERY (OUTPATIENT)
Age: 27
End: 2017-12-15

## 2017-12-15 ENCOUNTER — ANESTHESIA EVENT (OUTPATIENT)
Dept: OBGYN | Facility: CLINIC | Age: 27
End: 2017-12-15
Payer: COMMERCIAL

## 2017-12-15 ENCOUNTER — APPOINTMENT (OUTPATIENT)
Dept: GENERAL RADIOLOGY | Facility: CLINIC | Age: 27
End: 2017-12-15
Attending: OBSTETRICS & GYNECOLOGY
Payer: COMMERCIAL

## 2017-12-15 PROBLEM — Z98.891 S/P CESAREAN SECTION: Status: ACTIVE | Noted: 2017-12-15

## 2017-12-15 PROCEDURE — 37000008 ZZH ANESTHESIA TECHNICAL FEE, 1ST 30 MIN: Performed by: OBSTETRICS & GYNECOLOGY

## 2017-12-15 PROCEDURE — 25000128 H RX IP 250 OP 636: Performed by: ANESTHESIOLOGY

## 2017-12-15 PROCEDURE — 27210794 ZZH OR GENERAL SUPPLY STERILE: Performed by: OBSTETRICS & GYNECOLOGY

## 2017-12-15 PROCEDURE — 27110028 ZZH OR GENERAL SUPPLY NON-STERILE: Performed by: OBSTETRICS & GYNECOLOGY

## 2017-12-15 PROCEDURE — 25000132 ZZH RX MED GY IP 250 OP 250 PS 637: Performed by: OBSTETRICS & GYNECOLOGY

## 2017-12-15 PROCEDURE — 88307 TISSUE EXAM BY PATHOLOGIST: CPT | Performed by: OBSTETRICS & GYNECOLOGY

## 2017-12-15 PROCEDURE — 36000057 ZZH SURGERY LEVEL 3 1ST 30 MIN - UMMC: Performed by: OBSTETRICS & GYNECOLOGY

## 2017-12-15 PROCEDURE — 71000027 ZZH RECOVERY PHASE 2 EACH 15 MINS: Performed by: OBSTETRICS & GYNECOLOGY

## 2017-12-15 PROCEDURE — 88302 TISSUE EXAM BY PATHOLOGIST: CPT | Performed by: OBSTETRICS & GYNECOLOGY

## 2017-12-15 PROCEDURE — 25000128 H RX IP 250 OP 636: Performed by: OBSTETRICS & GYNECOLOGY

## 2017-12-15 PROCEDURE — 25000125 ZZHC RX 250

## 2017-12-15 PROCEDURE — 25000125 ZZHC RX 250: Performed by: OBSTETRICS & GYNECOLOGY

## 2017-12-15 PROCEDURE — 40000170 ZZH STATISTIC PRE-PROCEDURE ASSESSMENT II: Performed by: OBSTETRICS & GYNECOLOGY

## 2017-12-15 PROCEDURE — 12000032 ZZH R&B OB CRITICAL UMMC

## 2017-12-15 PROCEDURE — 36000059 ZZH SURGERY LEVEL 3 EA 15 ADDTL MIN UMMC: Performed by: OBSTETRICS & GYNECOLOGY

## 2017-12-15 PROCEDURE — 71000017 ZZH RECOVERY PHASE 1 LEVEL 3 EA ADDTL HR: Performed by: OBSTETRICS & GYNECOLOGY

## 2017-12-15 PROCEDURE — C9290 INJ, BUPIVACAINE LIPOSOME: HCPCS

## 2017-12-15 PROCEDURE — 25000128 H RX IP 250 OP 636: Performed by: NURSE ANESTHETIST, CERTIFIED REGISTERED

## 2017-12-15 PROCEDURE — 40000985 XR ABDOMEN PORT F1 VW

## 2017-12-15 PROCEDURE — 37000009 ZZH ANESTHESIA TECHNICAL FEE, EACH ADDTL 15 MIN: Performed by: OBSTETRICS & GYNECOLOGY

## 2017-12-15 PROCEDURE — 88302 TISSUE EXAM BY PATHOLOGIST: CPT | Mod: 26,59 | Performed by: OBSTETRICS & GYNECOLOGY

## 2017-12-15 PROCEDURE — 0UB70ZZ EXCISION OF BILATERAL FALLOPIAN TUBES, OPEN APPROACH: ICD-10-PCS | Performed by: OBSTETRICS & GYNECOLOGY

## 2017-12-15 PROCEDURE — 71000016 ZZH RECOVERY PHASE 1 LEVEL 3 FIRST HR: Performed by: OBSTETRICS & GYNECOLOGY

## 2017-12-15 PROCEDURE — 25000128 H RX IP 250 OP 636

## 2017-12-15 PROCEDURE — 25000565 ZZH ISOFLURANE, EA 15 MIN: Performed by: OBSTETRICS & GYNECOLOGY

## 2017-12-15 PROCEDURE — 25000125 ZZHC RX 250: Performed by: NURSE ANESTHETIST, CERTIFIED REGISTERED

## 2017-12-15 PROCEDURE — 88307 TISSUE EXAM BY PATHOLOGIST: CPT | Mod: 26 | Performed by: OBSTETRICS & GYNECOLOGY

## 2017-12-15 RX ORDER — OXYCODONE HYDROCHLORIDE 5 MG/1
5-10 TABLET ORAL
Status: DISCONTINUED | OUTPATIENT
Start: 2017-12-15 | End: 2017-12-18 | Stop reason: HOSPADM

## 2017-12-15 RX ORDER — KETOROLAC TROMETHAMINE 30 MG/ML
15 INJECTION, SOLUTION INTRAMUSCULAR; INTRAVENOUS EVERY 6 HOURS
Status: DISPENSED | OUTPATIENT
Start: 2017-12-15 | End: 2017-12-17

## 2017-12-15 RX ORDER — LANOLIN 100 %
OINTMENT (GRAM) TOPICAL
Status: DISCONTINUED | OUTPATIENT
Start: 2017-12-15 | End: 2017-12-18 | Stop reason: HOSPADM

## 2017-12-15 RX ORDER — PROPOFOL 10 MG/ML
INJECTION, EMULSION INTRAVENOUS PRN
Status: DISCONTINUED | OUTPATIENT
Start: 2017-12-15 | End: 2017-12-15

## 2017-12-15 RX ORDER — OXYTOCIN/0.9 % SODIUM CHLORIDE 30/500 ML
PLASTIC BAG, INJECTION (ML) INTRAVENOUS CONTINUOUS PRN
Status: DISCONTINUED | OUTPATIENT
Start: 2017-12-15 | End: 2017-12-15

## 2017-12-15 RX ORDER — HYDROCORTISONE 2.5 %
CREAM (GRAM) TOPICAL 3 TIMES DAILY PRN
Status: DISCONTINUED | OUTPATIENT
Start: 2017-12-15 | End: 2017-12-18 | Stop reason: HOSPADM

## 2017-12-15 RX ORDER — DIPHENHYDRAMINE HYDROCHLORIDE 50 MG/ML
25 INJECTION INTRAMUSCULAR; INTRAVENOUS EVERY 6 HOURS PRN
Status: DISCONTINUED | OUTPATIENT
Start: 2017-12-15 | End: 2017-12-18 | Stop reason: HOSPADM

## 2017-12-15 RX ORDER — FENTANYL CITRATE 50 UG/ML
25-50 INJECTION, SOLUTION INTRAMUSCULAR; INTRAVENOUS
Status: DISCONTINUED | OUTPATIENT
Start: 2017-12-15 | End: 2017-12-15

## 2017-12-15 RX ORDER — MISOPROSTOL 200 UG/1
400 TABLET ORAL
Status: DISCONTINUED | OUTPATIENT
Start: 2017-12-15 | End: 2017-12-18 | Stop reason: HOSPADM

## 2017-12-15 RX ORDER — OXYTOCIN/0.9 % SODIUM CHLORIDE 30/500 ML
100 PLASTIC BAG, INJECTION (ML) INTRAVENOUS CONTINUOUS
Status: DISCONTINUED | OUTPATIENT
Start: 2017-12-15 | End: 2017-12-18 | Stop reason: HOSPADM

## 2017-12-15 RX ORDER — ACETAMINOPHEN 325 MG/1
975 TABLET ORAL EVERY 8 HOURS
Status: DISCONTINUED | OUTPATIENT
Start: 2017-12-15 | End: 2017-12-18 | Stop reason: HOSPADM

## 2017-12-15 RX ORDER — OXYTOCIN 10 [USP'U]/ML
10 INJECTION, SOLUTION INTRAMUSCULAR; INTRAVENOUS
Status: DISCONTINUED | OUTPATIENT
Start: 2017-12-15 | End: 2017-12-18 | Stop reason: HOSPADM

## 2017-12-15 RX ORDER — DEXTROSE, SODIUM CHLORIDE, SODIUM LACTATE, POTASSIUM CHLORIDE, AND CALCIUM CHLORIDE 5; .6; .31; .03; .02 G/100ML; G/100ML; G/100ML; G/100ML; G/100ML
INJECTION, SOLUTION INTRAVENOUS CONTINUOUS
Status: DISCONTINUED | OUTPATIENT
Start: 2017-12-15 | End: 2017-12-18 | Stop reason: HOSPADM

## 2017-12-15 RX ORDER — ONDANSETRON 2 MG/ML
4 INJECTION INTRAMUSCULAR; INTRAVENOUS EVERY 30 MIN PRN
Status: DISCONTINUED | OUTPATIENT
Start: 2017-12-15 | End: 2017-12-15

## 2017-12-15 RX ORDER — OXYTOCIN/0.9 % SODIUM CHLORIDE 30/500 ML
340 PLASTIC BAG, INJECTION (ML) INTRAVENOUS CONTINUOUS PRN
Status: DISCONTINUED | OUTPATIENT
Start: 2017-12-15 | End: 2017-12-18 | Stop reason: HOSPADM

## 2017-12-15 RX ORDER — LIDOCAINE 40 MG/G
CREAM TOPICAL
Status: DISCONTINUED | OUTPATIENT
Start: 2017-12-15 | End: 2017-12-18 | Stop reason: HOSPADM

## 2017-12-15 RX ORDER — NALOXONE HYDROCHLORIDE 0.4 MG/ML
.1-.4 INJECTION, SOLUTION INTRAMUSCULAR; INTRAVENOUS; SUBCUTANEOUS
Status: DISCONTINUED | OUTPATIENT
Start: 2017-12-15 | End: 2017-12-15

## 2017-12-15 RX ORDER — ONDANSETRON 4 MG/1
4 TABLET, ORALLY DISINTEGRATING ORAL EVERY 30 MIN PRN
Status: DISCONTINUED | OUTPATIENT
Start: 2017-12-15 | End: 2017-12-15

## 2017-12-15 RX ORDER — ACETAMINOPHEN 325 MG/1
650 TABLET ORAL EVERY 4 HOURS PRN
Status: DISCONTINUED | OUTPATIENT
Start: 2017-12-18 | End: 2017-12-18 | Stop reason: HOSPADM

## 2017-12-15 RX ORDER — CEFAZOLIN SODIUM 1 G/3ML
INJECTION, POWDER, FOR SOLUTION INTRAMUSCULAR; INTRAVENOUS PRN
Status: DISCONTINUED | OUTPATIENT
Start: 2017-12-15 | End: 2017-12-15

## 2017-12-15 RX ORDER — DEXAMETHASONE SODIUM PHOSPHATE 4 MG/ML
INJECTION, SOLUTION INTRA-ARTICULAR; INTRALESIONAL; INTRAMUSCULAR; INTRAVENOUS; SOFT TISSUE PRN
Status: DISCONTINUED | OUTPATIENT
Start: 2017-12-15 | End: 2017-12-15

## 2017-12-15 RX ORDER — AMOXICILLIN 250 MG
1-2 CAPSULE ORAL 2 TIMES DAILY
Status: DISCONTINUED | OUTPATIENT
Start: 2017-12-15 | End: 2017-12-18 | Stop reason: HOSPADM

## 2017-12-15 RX ORDER — NALOXONE HYDROCHLORIDE 0.4 MG/ML
.1-.4 INJECTION, SOLUTION INTRAMUSCULAR; INTRAVENOUS; SUBCUTANEOUS
Status: DISCONTINUED | OUTPATIENT
Start: 2017-12-15 | End: 2017-12-16

## 2017-12-15 RX ORDER — KETOROLAC TROMETHAMINE 30 MG/ML
INJECTION, SOLUTION INTRAMUSCULAR; INTRAVENOUS PRN
Status: DISCONTINUED | OUTPATIENT
Start: 2017-12-15 | End: 2017-12-15

## 2017-12-15 RX ORDER — SODIUM CHLORIDE, SODIUM LACTATE, POTASSIUM CHLORIDE, CALCIUM CHLORIDE 600; 310; 30; 20 MG/100ML; MG/100ML; MG/100ML; MG/100ML
INJECTION, SOLUTION INTRAVENOUS CONTINUOUS
Status: DISCONTINUED | OUTPATIENT
Start: 2017-12-15 | End: 2017-12-15

## 2017-12-15 RX ORDER — CARBOPROST TROMETHAMINE 250 UG/ML
250 INJECTION, SOLUTION INTRAMUSCULAR
Status: DISCONTINUED | OUTPATIENT
Start: 2017-12-15 | End: 2017-12-18 | Stop reason: HOSPADM

## 2017-12-15 RX ORDER — DIPHENHYDRAMINE HCL 25 MG
25 CAPSULE ORAL EVERY 6 HOURS PRN
Status: DISCONTINUED | OUTPATIENT
Start: 2017-12-15 | End: 2017-12-18 | Stop reason: HOSPADM

## 2017-12-15 RX ORDER — NALOXONE HYDROCHLORIDE 0.4 MG/ML
.1-.4 INJECTION, SOLUTION INTRAMUSCULAR; INTRAVENOUS; SUBCUTANEOUS
Status: DISCONTINUED | OUTPATIENT
Start: 2017-12-15 | End: 2017-12-18 | Stop reason: HOSPADM

## 2017-12-15 RX ORDER — SIMETHICONE 80 MG
80 TABLET,CHEWABLE ORAL 4 TIMES DAILY PRN
Status: DISCONTINUED | OUTPATIENT
Start: 2017-12-15 | End: 2017-12-18 | Stop reason: HOSPADM

## 2017-12-15 RX ORDER — BISACODYL 10 MG
10 SUPPOSITORY, RECTAL RECTAL DAILY PRN
Status: DISCONTINUED | OUTPATIENT
Start: 2017-12-17 | End: 2017-12-18 | Stop reason: HOSPADM

## 2017-12-15 RX ORDER — IBUPROFEN 400 MG/1
400-800 TABLET, FILM COATED ORAL EVERY 6 HOURS PRN
Status: DISCONTINUED | OUTPATIENT
Start: 2017-12-15 | End: 2017-12-18 | Stop reason: HOSPADM

## 2017-12-15 RX ORDER — FENTANYL CITRATE 50 UG/ML
INJECTION, SOLUTION INTRAMUSCULAR; INTRAVENOUS PRN
Status: DISCONTINUED | OUTPATIENT
Start: 2017-12-15 | End: 2017-12-15

## 2017-12-15 RX ORDER — HYDROMORPHONE HYDROCHLORIDE 1 MG/ML
.3-.5 INJECTION, SOLUTION INTRAMUSCULAR; INTRAVENOUS; SUBCUTANEOUS
Status: DISCONTINUED | OUTPATIENT
Start: 2017-12-15 | End: 2017-12-18 | Stop reason: HOSPADM

## 2017-12-15 RX ORDER — SODIUM CHLORIDE, SODIUM LACTATE, POTASSIUM CHLORIDE, CALCIUM CHLORIDE 600; 310; 30; 20 MG/100ML; MG/100ML; MG/100ML; MG/100ML
INJECTION, SOLUTION INTRAVENOUS CONTINUOUS PRN
Status: DISCONTINUED | OUTPATIENT
Start: 2017-12-15 | End: 2017-12-15

## 2017-12-15 RX ORDER — BUPIVACAINE HYDROCHLORIDE AND EPINEPHRINE 2.5; 5 MG/ML; UG/ML
INJECTION, SOLUTION INFILTRATION; PERINEURAL PRN
Status: DISCONTINUED | OUTPATIENT
Start: 2017-12-15 | End: 2017-12-15

## 2017-12-15 RX ORDER — ONDANSETRON 2 MG/ML
4 INJECTION INTRAMUSCULAR; INTRAVENOUS EVERY 6 HOURS PRN
Status: DISCONTINUED | OUTPATIENT
Start: 2017-12-15 | End: 2017-12-18 | Stop reason: HOSPADM

## 2017-12-15 RX ADMIN — FENTANYL CITRATE 25 MCG: 50 INJECTION INTRAMUSCULAR; INTRAVENOUS at 17:46

## 2017-12-15 RX ADMIN — HYDROMORPHONE HYDROCHLORIDE 0.4 MG: 1 INJECTION, SOLUTION INTRAMUSCULAR; INTRAVENOUS; SUBCUTANEOUS at 18:25

## 2017-12-15 RX ADMIN — PROPOFOL 200 MG: 10 INJECTION, EMULSION INTRAVENOUS at 15:53

## 2017-12-15 RX ADMIN — KETOROLAC TROMETHAMINE 30 MG: 30 INJECTION, SOLUTION INTRAMUSCULAR at 16:48

## 2017-12-15 RX ADMIN — HYDROMORPHONE HYDROCHLORIDE 0.2 MG: 1 INJECTION, SOLUTION INTRAMUSCULAR; INTRAVENOUS; SUBCUTANEOUS at 16:45

## 2017-12-15 RX ADMIN — CEFAZOLIN 2 G: 1 INJECTION, POWDER, FOR SOLUTION INTRAMUSCULAR; INTRAVENOUS at 15:56

## 2017-12-15 RX ADMIN — FENTANYL CITRATE 25 MCG: 50 INJECTION INTRAMUSCULAR; INTRAVENOUS at 17:51

## 2017-12-15 RX ADMIN — SENNOSIDES AND DOCUSATE SODIUM 1 TABLET: 8.6; 5 TABLET ORAL at 08:52

## 2017-12-15 RX ADMIN — KETOROLAC TROMETHAMINE 15 MG: 30 INJECTION, SOLUTION INTRAMUSCULAR at 22:58

## 2017-12-15 RX ADMIN — SENNOSIDES AND DOCUSATE SODIUM 2 TABLET: 8.6; 5 TABLET ORAL at 22:59

## 2017-12-15 RX ADMIN — ACETAMINOPHEN 975 MG: 325 TABLET, FILM COATED ORAL at 18:58

## 2017-12-15 RX ADMIN — SODIUM CHLORIDE, POTASSIUM CHLORIDE, SODIUM LACTATE AND CALCIUM CHLORIDE: 600; 310; 30; 20 INJECTION, SOLUTION INTRAVENOUS at 15:45

## 2017-12-15 RX ADMIN — MIDAZOLAM 2 MG: 1 INJECTION INTRAMUSCULAR; INTRAVENOUS at 15:58

## 2017-12-15 RX ADMIN — DEXAMETHASONE SODIUM PHOSPHATE 4 MG: 4 INJECTION, SOLUTION INTRA-ARTICULAR; INTRALESIONAL; INTRAMUSCULAR; INTRAVENOUS; SOFT TISSUE at 16:16

## 2017-12-15 RX ADMIN — FENTANYL CITRATE 100 MCG: 50 INJECTION, SOLUTION INTRAMUSCULAR; INTRAVENOUS at 15:59

## 2017-12-15 RX ADMIN — BUPIVACAINE 20 ML: 13.3 INJECTION, SUSPENSION, LIPOSOMAL INFILTRATION at 17:50

## 2017-12-15 RX ADMIN — HYDROMORPHONE HYDROCHLORIDE 0.5 MG: 1 INJECTION, SOLUTION INTRAMUSCULAR; INTRAVENOUS; SUBCUTANEOUS at 20:52

## 2017-12-15 RX ADMIN — Medication 160 MG: at 15:53

## 2017-12-15 RX ADMIN — BUPIVACAINE HYDROCHLORIDE AND EPINEPHRINE BITARTRATE 20 ML: 2.5; .005 INJECTION, SOLUTION INFILTRATION; PERINEURAL at 17:50

## 2017-12-15 RX ADMIN — OXYTOCIN-SODIUM CHLORIDE 0.9% IV SOLN 30 UNIT/500ML 100 ML/HR: 30-0.9/5 SOLUTION at 18:31

## 2017-12-15 RX ADMIN — CLINDAMYCIN HYDROCHLORIDE 300 MG: 300 CAPSULE ORAL at 06:02

## 2017-12-15 RX ADMIN — ONDANSETRON 4 MG: 2 INJECTION INTRAMUSCULAR; INTRAVENOUS at 16:01

## 2017-12-15 RX ADMIN — OXYCODONE HYDROCHLORIDE 10 MG: 5 TABLET ORAL at 22:08

## 2017-12-15 RX ADMIN — OXYTOCIN-SODIUM CHLORIDE 0.9% IV SOLN 30 UNIT/500ML 300 ML/HR: 30-0.9/5 SOLUTION at 15:58

## 2017-12-15 RX ADMIN — HYDROMORPHONE HYDROCHLORIDE 0.3 MG: 1 INJECTION, SOLUTION INTRAMUSCULAR; INTRAVENOUS; SUBCUTANEOUS at 17:31

## 2017-12-15 RX ADMIN — FENTANYL CITRATE 50 MCG: 50 INJECTION INTRAMUSCULAR; INTRAVENOUS at 17:54

## 2017-12-15 RX ADMIN — SODIUM CHLORIDE, SODIUM LACTATE, POTASSIUM CHLORIDE, CALCIUM CHLORIDE AND DEXTROSE MONOHYDRATE: 5; 600; 310; 30; 20 INJECTION, SOLUTION INTRAVENOUS at 23:57

## 2017-12-15 RX ADMIN — FENTANYL CITRATE 50 MCG: 50 INJECTION, SOLUTION INTRAMUSCULAR; INTRAVENOUS at 16:33

## 2017-12-15 RX ADMIN — HYDROMORPHONE HYDROCHLORIDE 0.5 MG: 1 INJECTION, SOLUTION INTRAMUSCULAR; INTRAVENOUS; SUBCUTANEOUS at 16:08

## 2017-12-15 RX ADMIN — Medication 1 CAPSULE: at 08:45

## 2017-12-15 RX ADMIN — SODIUM CHLORIDE, POTASSIUM CHLORIDE, SODIUM LACTATE AND CALCIUM CHLORIDE: 600; 310; 30; 20 INJECTION, SOLUTION INTRAVENOUS at 16:37

## 2017-12-15 RX ADMIN — OXYCODONE HYDROCHLORIDE 10 MG: 5 TABLET ORAL at 18:58

## 2017-12-15 RX ADMIN — HYDROMORPHONE HYDROCHLORIDE 0.3 MG: 1 INJECTION, SOLUTION INTRAMUSCULAR; INTRAVENOUS; SUBCUTANEOUS at 18:17

## 2017-12-15 RX ADMIN — PRENATAL VIT W/ FE FUMARATE-FA TAB 27-0.8 MG 1 TABLET: 27-0.8 TAB at 08:46

## 2017-12-15 RX ADMIN — CLINDAMYCIN HYDROCHLORIDE 300 MG: 300 CAPSULE ORAL at 14:11

## 2017-12-15 RX ADMIN — HYDROMORPHONE HYDROCHLORIDE 0.3 MG: 1 INJECTION, SOLUTION INTRAMUSCULAR; INTRAVENOUS; SUBCUTANEOUS at 18:02

## 2017-12-15 ASSESSMENT — LIFESTYLE VARIABLES: TOBACCO_USE: 1

## 2017-12-15 NOTE — PROVIDER NOTIFICATION
12/15/17 1505   Provider Notification   Provider Name/Title Cross, Hurley   Method of Notification In Department   Request Evaluate - Remote   Notification Reason Decels   Provider notified of VD, plan per provider to extend FHR monitoring. Will proceed with ongoing assessment.

## 2017-12-15 NOTE — PROGRESS NOTES
"Maternal Fetal Medicine Daily Note December 15, 2017       S: Naheed is feeling well today . Reports normal fetal movement. No vaginal bleeding. Denies chills, fever, cramping. Leakage of fluid is unchanged.      O:  /76  Pulse 82  Temp 98.2  F (36.8  C) (Oral)  Resp 18  Ht 1.727 m (5' 8\")  Wt 116 kg (255 lb 12.8 oz)  LMP 2017  SpO2 100%  BMI 38.89 kg/m2     Gen: NAD, lying in bed comfortably  Abd: Gravid, nontender  Ext: Lower extremities 1+ edema      FHT: 140, mod annabelle, accels present, decels absent  [place]: No contractions      Imagin/14: BPP 6/8. MAUREEN 1.0, breech, normal umbillical artery S/D ratio      Assessment/Plan:   Naheed Crouch is a 27 year old  at 30w6d  by LMP c/w 7w5d US, admitted as transfer of care from [place] for PPROM at 26w5d. Diagnosed with IUGR, GHTN and marginal abruption this admission. Pregnancy is also complicated by history of LEEP, tobacco use, obesity.      PPROM at 26w5d   - Expectant management until 34w, delivery indicated for fetal/maternal status, labor, infection  - Infectious workup negative; currently VSS, afebrile, WBC 8.6   - S/p 7D latency antibiotics     Marginal abruption:  - Clinically stable with no signs of active bleeding, labor or non-reassuring fetal status.  - Rh positive, rhogam not indicated     GHTN:  - UPC 0.29, does not meet pre eclampsia criteria at this time  - No indications for magnesium at this time given isolated severe pressures, however magnesium indicated with severe symptoms or ongoing uncontrolled severe range blood pressures  - HELLP nl, repeat as clinically indicated   - Notify MD with 160/110 sustained, IV hydralazine or labetalol for sustained severe range blood pressures      FWB:   - FHT reassuring and appropriate for gestational age, continue TID monitoring, PRN with fetal/maternal indications  - S/p BMZ (17-17) and Rescue (17-17)  - IV Magnesium indicated for neuroprotection if " "delivery is imminent before 32 weeks  - S/p NICU consult  - q2w growth scans, last  EFW 20% 1089g, AC<3% w/ marginal abruption  - Continue twice weekly BPP and UA Doppler Mon/Thur, or more frequently if otherwise clinically indicated      Tobacco use:  - s/p patch taper  - Nicotine gum ordered PRN      PNC:   - Rh positive; Rubella immune; GBS positive, HIV NR, GC/Chlam NR, RPR NR, Hep B sAg NR. GCT 90.   - SW following  - s/p Flu and Tdap  - Continue daily PNV     Perineal abscess:  - s/p I&D , continue clindamycin D#      Mode of delivery:  - Expectant management until 34w or indication for delivery including intra-amniotic infection, abruption, fetal indications, labor, or persistent reversal of UA dopplers prior to 32w  - Breech on today's US, aware that delivery will likely be , however candidate for  if cephalic and reassuring maternal/fetal status.  - If requires CS, patient desires BTL at time of CS, Private insurance. S/p consent for BTL and  upon admission.      Dispo:  - Inpatient until delivery and postpartum recovery    iNall Hurley  [employer] fellow  December 15, 2017     Maternal-Fetal Medicine Attending Addendum    Late entry, patient seen several hours ago.      I have discussed the care of Ms. Crouch with the medical student/resident/fellow during morning rounds.  Patient seen & examined by me.  Agree with above, I wish to note the following:      S: Pt denies contractions, VB.  Reports + FM.  Still the same clear LOF.  Denies HA and visual changes.  Up and out of bed a lot.      O:  /76 (118-130/64-76)  Pulse 82  Temp 98.2  F (36.8  C) (Oral)  Resp 18  Ht 1.727 m (5' 8\")  Wt 116 kg (255 lb 12.8 oz)  LMP 2017  SpO2 100%  BMI 38.89 kg/m2  GEN: NAD  ABD: gravid, NT, obese  FHT: 135 w/ moderate variability, + 10x10 A, no D  TOCO: no contractions  NST interpretation for today: reasssuring/appropriate for GA    A/P: 27 year old  30w6d admitted with " PPROM.  No signs or symptoms of chorioamnionitis.  Pregnancy also complicated by asymmetric fetal growth restriction with normal UA Dopplers and gestational HTN.  No contraindications to expectant management.  Continue hospitalization until planned delivery at 34 weeks unless earlier delivery indicated.  Twice weekly Dopplers and every two week growth ultrasounds.  Continue to monitor maternal blood pressure.  Repeat labs weekly and prn.  Breech would require .     I spent a total of 15 minutes face-to-face or coordinating care of Naheed Crouch.  More than 50% of my time on the unit was spent counseling and/or coordinating care.    Date of service (when I saw the patient): December 15, 2017      Larissa Jha MD  , OB/GYN  Maternal-Fetal Medicine  [email or web address]  [phone] (Academic office)  [phone] (Pager)

## 2017-12-15 NOTE — OP NOTE
[facility]  Full Operative Progress Note     Surgery Date:  12/15/2017  Surgeon:  Larissa Doyle MD  Assistants:    Criselda Azul MD PGY-3  Dion Hurley MD PYG-5      Pre-op Diagnosis:  1. Intrauterine pregnancy at 30w6d     2. Fetal bradycardia     3. PPROM at 26w5d     4. Marginal abruption     5. Gestational hypertension     6. Obesity     7. Tobacco use in pregnancy     8. GBS positive     9. Desires permanent sterilization    Post-op Diagnosis:  1. Same      2. Liveborn male infant     Procedure: CODE Primary high-transverse  section with double layer uterine closure via pfannenstiel skin incision and bilateral salpingectomy    Anesthesia: General endotracheal  EBL: 1100 mL  IVF:  1500 mL crystalloid  UOP: 900 mL clear urine at the end of the case  Drains: Browning Catheter   Specimens: Placenta, cord gases, cord blood, right fallopian tube and left fallopian tube  Complications: None     Indications:   Naheed Crouhc is a 27 year old  at 30w6d admitted for PPROM at 26w5d. She received betamethasone and latency antibiotics. On 17 she had a placental abruption visualized on ultrasound, which was stable. She then received a rescue dose of betamethasone. She was diagnosed with gestational hypertension and asymmetric IUGR with EFW 20% and AC <3%. She was expectantly managed until 12/15/17 at 30w6d when she developed fetal bradycardia. A code  section was called. En route to the , the patient stated clearly to multiple people she desired the tubal ligation. The risks, benefits, and alternatives of  section were discussed with the patient, and she agreed to proceed.     Findings:   1. No fascial or uterine incisions  2. No amniotic fluid  3. Liveborn male infant in breech presentation. Apgars 5 at 1 minute & 9 at 5 minutes. Weight pending.  4. Arterial pH 7.08, arterial BD 11; Venous pH 7.32, BD 3.6  5. Normal uterus, fallopian tubes, and  ovaries.   6. Excellent hemostasis bilaterally s/p bilateral salpingectomy    Procedure Details:   The patient was brought to the OR, a malone catheter was placed and fetal heart tones continued to be bradycardic.  She was placed in the dorsal supine position with a slight leftward tilt. She was prepped using a betandine wand given emergent nature of case and draped. General endotracheal anesthesia was administered. A pfannenstiel skin incision was made with the scalpel, and carried down to the underlying fascia with sharp and blunt dissection. The fascia was incised in the midline, and the incision was extended laterally in a blunt fashion, and extended inferiorly and superiorly in a blunt fashion.The peritoneum was entered bluntly, and the opening was extended with digital pressure. The bladder blade was placed. A transverse hysterotomy was made with the scalpel and the incision was extended superiorly into the active segment with bandage scissors. The infant was noted to be in the breech position, and was delivered atraumatically using standard breech maneuvers. No nuchal cord was noted. The cord was doubly clamped and cut immediately, and the infant was handed off to the awaiting NICU staff. A segment of cord was cut and sent for cord gases. The placenta was delivered with gentle traction on the umbilical cord and uterine massage. The uterus was exteriorized and cleared of all clots and debris. Uterine tone was noted to be adequate with 30 units of pitocin given through the running IV and uterine massage.  The hysterotomy was closed with a running locked suture of 0 Monocryl.  The hysterotomy was then imbricated using an 0 Vicryl suture. The hysterotomy was noted to be hemostatic after figure of X sutures of 0 Vicryl at the right apex. A window was created in an avascular portion of the left mesosalpinx. Two clamps were placed across the mesosalpinx and the fallopian tube was excised above the clamps. The  pedicles were doubly tied with 0 Vicryl. This was repeated in a similar manner on the right side. Bilateral fallopian tubes were sent for pathology. Excellent hemostasis was noted. The posterior cul-de-sac was suctioned and cleared of all clots and debris. The uterus was returned to the abdomen. The pericolic gutters were suctioned and cleared of all clots and debris. The hysterotomy was reexamined and noted to be hemostatic. The fascia and rectus muscles were examined and areas of oozing were controlled with electrocautery. The fascia was closed with a running 0 Vicryl suture. The subcutaneous tissue was irrigated and areas of oozing were controlled with electrocautery. The subcutaneous tissue was greater than 2 cm in thickness, and was therefore closed with running 3.0 Vicryl. The skin was closed with 4.0 Monocryl and covered with a sterile dressing.    All sponge, needle, and instrument counts were correct. The patient tolerated the procedure well, and was transferred to recovery in stable condition. Dr. Doyle was present and scrubbed for the entirety of the procedure.     Criselda Azul MD  OB GYN PGY-3  12/15/2017, 4:56 PM    Staff MD Note  I was present and scrubbed for the entire procedure noted above.  I agree with the description above and any necessary changes have been made by me.  Larissa Doyle MD

## 2017-12-15 NOTE — PROVIDER NOTIFICATION
12/15/17 1534   Provider Notification   Provider Name/Title Hurley   Method of Notification At Bedside   Request Evaluate in Person   Notification Reason Decels   Hurley at bedside to perform U/S for fetal lie confirmation, FHR persistent 70's-80's despite reposition IVFB and O2.

## 2017-12-15 NOTE — PLAN OF CARE
Problem: PROM, PPROM, Prolonged Rupture of Membranes (Adult,Obstetrics,Pediatric)  Goal: Signs and Symptoms of Listed Potential Problems Will be Absent, Minimized or Managed (PROM, PPROM, Prolonged Rupture of Membranes)  Signs and symptoms of listed potential problems will be absent, minimized or managed by discharge/transition of care (reference PROM, PPROM, Prolonged Rupture of Membranes (Adult,Obstetrics,Pediatric) CPG).   Outcome: No Change  Patient slept well throughout the night. Continues to leak a scant amount of clear fluids. no concerns at this time.

## 2017-12-15 NOTE — PROVIDER NOTIFICATION
12/15/17 1542   Provider Notification   Provider Name/Title Cross, Demarcus   Method of Notification At Bedside   Request Evaluate in Person   Notification Reason Decels   Code C/S called 1542. Pt. Transferred to OR.

## 2017-12-15 NOTE — ANESTHESIA PREPROCEDURE EVALUATION
Anesthesia Evaluation     . Pt has had prior anesthetic. Type: MAC           ROS/MED HX    ENT/Pulmonary:     (+)tobacco use, , . .    Neurologic:  - neg neurologic ROS     Cardiovascular:  - neg cardiovascular ROS       METS/Exercise Tolerance:  >4 METS   Hematologic:     (+) Anemia, -      Musculoskeletal:  - neg musculoskeletal ROS       GI/Hepatic:     (+) GERD Symptomatic,       Renal/Genitourinary:  - ROS Renal section negative       Endo:     (+) Obesity, .      Psychiatric:  - neg psychiatric ROS       Infectious Disease:  - neg infectious disease ROS       Malignancy:      - no malignancy   Other:    (+) Possibly pregnant   - neg other ROS               Physical Exam  Normal systems: cardiovascular, pulmonary and dental    Airway   Mallampati: II  TM distance: >3 FB  Neck ROM: full    Dental     Cardiovascular   Rhythm and rate: regular and normal      Pulmonary    breath sounds clear to auscultation        ANESTHESIA PREOP EVALUATION    Procedure: Procedure(s):   - Wound Class: II-Clean Contaminated    Patient  at 36 weeks GA, fetus with IUGR and profound sustained bradycardia    PMHx/PSHx/ROS:  Past Medical History:   Diagnosis Date     Anemia      ASCUS with positive high risk HPV 2012    types 16, 53 & 59     H/O colposcopy with cervical biopsy 2013    PAUL 2 & 3     Tobacco use disorder        Past Surgical History:   Procedure Laterality Date     LEEP TX, CERVICAL  2013    PAUL 3           Allergies:   Allergies   Allergen Reactions     Amoxicillin Nausea and Vomiting and Itching       Meds:   Prescriptions Prior to Admission   Medication Sig Dispense Refill Last Dose     Prenatal Vit-Fe Fumarate-FA (PRENATAL VITAMIN PO) Take 1 tablet by mouth daily   Past Week at Unknown time       No current outpatient prescriptions on file.       Physical Exam:  VS: T 97.7, P 82, /79, R 18, SpO2 100%.  Weight 116 Kg      BMP:  Recent Labs   Lab Test  17   0807   CR  0.44*     LFTs:   Recent  Labs   Lab Test  12/11/17   0807   AST  10   ALT  19     CBC:   Recent Labs   Lab Test  12/11/17   0807  11/16/17   2307   WBC   --   8.8   RBC   --   3.69*   HGB  11.5*  10.8*   HCT   --   32.7*   MCV   --   89   MCH   --   29.3   MCHC   --   33.0   RDW   --   14.0   PLT  270  288     Coags:  No results for input(s): INR, PTT, FIBR in the last 37299 hours.        Alda Lambert MD      12/15/2017  4:11 PM                  Anesthesia Plan      History & Physical Review  History and physical reviewed and following examination; no interval change.    ASA Status:  3 emergent.    NPO Status:  Full stomach    Plan for General, RSI and ETT with Intravenous and Propofol induction. Maintenance will be Balanced.    PONV prophylaxis:  Ondansetron (or other 5HT-3) and Dexamethasone or Solumedrol  Additional equipment: Videolaryngoscope Patient was rushed to the OR emergently by the OB team. I was able to see the patient while on her way to the OR; clarified h/o of no major medical issues except smoking, no h/o general anesthetics and no family h/o complications from anesthesia. Full stomach, airway seems feasible.      Postoperative Care  Postoperative pain management:  IV analgesics and Oral pain medications.      Consents  Anesthetic plan, risks, benefits and alternatives discussed with:  Patient and Implied consent/emergency..

## 2017-12-15 NOTE — PLAN OF CARE
Problem: Patient Care Overview  Goal: Plan of Care/Patient Progress Review  Outcome: Declining  Pt. VSS throughout shift, pt. Denied any pain, contractions, s/s of infection or change in condition. Regular FHR monitoring initiated per order at 1413.  's moderate variability + accels, recurrent variable decels occurred beginning at 1455. Provider notified, monitoring extended. IV fluid bolus initiated. Provider at bedside to perform spec exam, followed by SVE at 1512, cervix closed, no cord prolapse noted per MD. O2 initiated, pt repositioned left and right lateral, U/S confirmation of FHR location at 1534, FHR 70-80's audible, no recovery following position change, continued IVFB and O2 administration. Code C/S called per MD at 1542. Pt. Transferred to OR, FHR confirmed in 70's, C/S performed.

## 2017-12-15 NOTE — PROGRESS NOTES
Retrospective note from 1540    Pt put on monitoring for routine afternoon monitoring  @1455 a deep variable deceleration noted of 1-2 min down to 60s which recurred @ 1507, 1515, 1535 with return to baseline in between.    Pt was evaluated at bedside due to change in fetal tracing, at the time normal fetal heart rate baseline.  GEN: NAD  ABD: gravid, NT  TOCO: no contractions  SSE: closed cervix with no cord prolapse noted    She was placed on lateral side, and was given O2 mask.    Bedside US was done @ 1539 with fetal low HR noted down to 80s-60s lasted for 5 min    at bedside and  code was called @ 1544.   Patient was in the OR @1546 and fetal heart rate was still bradycardic.   A stat CS done with  and  (Review OP note for details), baby delivered at 15:57.      Maternal-Fetal Medicine Attending Addendum    Late entry.      The pt is a 27 year old  30w6d admitted with PPROM, ultrasound evidence of abruption, FGR and gestational HTN    Called urgently to room by Dr. Hurley who was evaluating patient for new onset variable decelerations.      Prolonged deceleration noted.  Pt brought urgently into OR, code  called.  In OR fetal heart rate was still bradycardic and therefore a  was performed.  The pt confirmed her wishes for permanent surgical sterilization.  Please see Dr. Doyle's operative note for details of the surgery.  She delivered a male infant 1300 grams, Apgars 5, 8 & 9.  Cord pH V 7.32/3.6 & A 7.08/11.  Suspect abruption given patient's clinical status and ultrasound findings.  Placenta to pathology.      Date of service (when I saw the patient): December 15, 2017      Larissa Jha MD  , OB/GYN  Maternal-Fetal Medicine  [email or web address]  [phone] (Academic office)  [phone] (Pager)

## 2017-12-15 NOTE — PROGRESS NOTES
Maternal Fetal Medicine Daily Note      S: Naheed is feeling well today. Reports normal fetal movement. No vaginal bleeding. Denies chills, fever, cramping. Leakage of fluid is unchanged.     O:  Vitals:    17 2020 12/15/17 0038 12/15/17 0558 12/15/17 0842   BP: 119/65 130/65 118/66 125/76   Pulse:       Resp:    Temp: 97.8  F (36.6  C) 97.9  F (36.6  C) 98.1  F (36.7  C) 98.2  F (36.8  C)   TempSrc: Oral Oral Oral Oral   SpO2:       Weight:   116 kg (255 lb 12.8 oz)    Height:         Gen: NAD, lying in bed comfortably  Abd: Gravid, nontender  Ext: Lower extremities 1+ edema      FHT:BL:150, accel present, decel absent  [place]: No contractions      Imagin/14: breech, BPP 6/8 for no amniotic fluid    Assessment/Plan:   Naheed Crouch is a 27 year old  at 30w6d by LMP c/w 7w5d US, admitted as transfer of care from [place] for PPROM at 26w5d. Diagnosed with IUGR, GHTN and marginal abruption this admission. Pregnancy is also complicated by history of LEEP, tobacco use, obesity.      PPROM at 26w6d   - Expectant management until 34w, delivery indicated for fetal/maternal status, labor, infection  - Infectious workup negative; currently VSS, afebrile, WBC 8.6   - S/p 7D latency antibiotics     GHTN:  - UPC 0.29, does not meet pre eclampsia criteria at this time  - No indications for magnesium at this time given isolated severe pressures, however magnesium indicated with severe symptoms or ongoing uncontrolled severe range blood pressures  - HELLP nl, repeat as clinically indicated   - Notify MD with 160/110 sustained, IV hydralazine or labetalol for sustained severe range blood pressures     FWB:   - FHT reassuring and appropriate for gestational age, continue TID monitoring, PRN with fetal/maternal indications  - S/p BMZ (17-17) and Rescue (17-17)  - IV Magnesium indicated for neuroprotection if delivery is imminent before 32 weeks  - S/p NICU consult  -  q2w growth scans, last  EFW 20% 1089g, AC<3% w/ marginal abruption  - Continue twice weekly BPP and UA Doppler Mon/Thur, or more frequently if otherwise clinically indicated      Tobacco use:  - s/p patch taper  - Nicotine gum ordered PRN      PNC:   - Rh positive; Rubella immune; GBS positive, HIV NR, GC/Chlam NR, RPR NR, Hep B sAg NR. GCT 90.   - SW following  - s/p Flu and Tdap  - Continue daily PNV    Perineal abscess:  - s/p I&D , continue clindamycin D#3/7      Mode of delivery:  - Expectant management until 34w or indication for delivery including intra-amniotic infection, abruption, fetal indications, labor, or persistent reversal of UA dopplers prior to 32w  - Breech on today's US, aware that delivery will likely be , however candidate for  if cephalic and reassuring maternal/fetal status.  - If requires CS, patient desires BTL at time of CS, Private insurance. S/p consent for BTL and  upon admission.     Marginal abruption:  - Clinically stable with no signs of active bleeding, labor or non-reassuring fetal status.  - Rh positive, rhogam not indicated      Dispo:  - Inpatient until delivery and postpartum recovery     Fabiano Coffey, MS3, acting as scribe for        Please see  note  Niall Hurley  [facility] fellow  December 15, 2017

## 2017-12-15 NOTE — ANESTHESIA CARE TRANSFER NOTE
Patient: Naheed Crouch    Procedure(s):   - Wound Class: II-Clean Contaminated    Diagnosis: Pregnancy  Diagnosis Additional Information: No value filed.    Anesthesia Type:   No value filed.     Note:  Airway :Room Air  Patient transferred to:PACU  Comments: Patient awake and alertHandoff Report: Identifed the Patient, Identified the Reponsible Provider, Reviewed the pertinent medical history, Discussed the surgical course, Reviewed Intra-OP anesthesia mangement and issues during anesthesia, Set expectations for post-procedure period and Allowed opportunity for questions and acknowledgement of understanding      Vitals: (Last set prior to Anesthesia Care Transfer)    CRNA VITALS  12/15/2017 1701 - 12/15/2017 1744      12/15/2017             Pulse: 87    Ht Rate: 85    SpO2: 97 %                Electronically Signed By: Jelena Gibbons MD  December 15, 2017  5:44 PM

## 2017-12-15 NOTE — PROVIDER NOTIFICATION
12/15/17 1512   Provider Notification   Provider Name/Title Hurley   Method of Notification At Bedside   Request Evaluate in Person   Notification Reason Decels;SVE   Provider at bedside to perform Spec followed by SVE, cervix closed, no cord prolapse noted per MD.

## 2017-12-15 NOTE — PROGRESS NOTES
"[facility]  MATERNAL CHILD HEALTH   SOCIAL WORK PROGRESS NOTE          Checked in with Naheed in antepartum room. She reported it has been a better week than last week. She is also adjusting to receiving difficulty news. She continues to deny concerns with her mood, sleep, or appetite. She continues to remain appreciative of the medical care she receives. She is hopeful baby will \"cook\" as long as able as she anticipates it being challenging to be in the NICU. Mom continues to report no additional questions, concerns, or resource needs at this time.     HARRISON Tang, [facility]   Social Worker  Maternal Child Health   Phone: [phone]  Pager: [phone]    "

## 2017-12-15 NOTE — PLAN OF CARE
Problem: Patient Care Overview  Goal: Plan of Care/Patient Progress Review  Outcome: No Change   Premature Rupture of Membranes  Data: Afebrile. Leaking scant  amounts of clear fluid. Contraction pattern stable and within parameters. Fetal assessment Appropriate for Gestational Age. Signs and symptoms of infection absent.  Last BPP on   Dopplers: WNL, MAUREEN: 1cm.  Latency antibiotic course complete.  Betamethasone Completed given on  and   Interventions: Monitor vital signs and indicators of infection every 4 hours while awake. Continue uterine/fetal assessment 3 times daily.  BPP/doppler every Mon/Thurs.  Activity level: Regular activity. Preventive measures include Positioning and Frequent voiding. Encourage active range of motion and frequent position changes.  Plan: Continue expectant management. Observe for and notify care provider of indicators of progressing labor, signs/symptoms of infection, or fetal/maternal compromise.

## 2017-12-15 NOTE — PLAN OF CARE
Problem: Patient Care Overview  Goal: Plan of Care/Patient Progress Review  Outcome: No Change  VS WNL. Denies cramping, vaginal bleeding.  Scant clear fluid leaking. Patient stable.  Report given to Bushra HARDWICK at 1400.

## 2017-12-16 LAB — HGB BLD-MCNC: 10.6 G/DL (ref 11.7–15.7)

## 2017-12-16 PROCEDURE — 25000128 H RX IP 250 OP 636: Performed by: OBSTETRICS & GYNECOLOGY

## 2017-12-16 PROCEDURE — 36415 COLL VENOUS BLD VENIPUNCTURE: CPT | Performed by: OBSTETRICS & GYNECOLOGY

## 2017-12-16 PROCEDURE — 25000132 ZZH RX MED GY IP 250 OP 250 PS 637: Performed by: OBSTETRICS & GYNECOLOGY

## 2017-12-16 PROCEDURE — 85018 HEMOGLOBIN: CPT | Performed by: OBSTETRICS & GYNECOLOGY

## 2017-12-16 PROCEDURE — 12000028 ZZH R&B OB UMMC

## 2017-12-16 RX ORDER — LACTOBACILLUS RHAMNOSUS GG 10B CELL
1 CAPSULE ORAL 2 TIMES DAILY
Status: DISCONTINUED | OUTPATIENT
Start: 2017-12-16 | End: 2017-12-18 | Stop reason: HOSPADM

## 2017-12-16 RX ORDER — OXYCODONE HYDROCHLORIDE 5 MG/1
5-10 TABLET ORAL
Qty: 25 TABLET | Refills: 0 | Status: SHIPPED | OUTPATIENT
Start: 2017-12-16 | End: 2018-01-26

## 2017-12-16 RX ORDER — IBUPROFEN 400 MG/1
400-800 TABLET, FILM COATED ORAL EVERY 6 HOURS PRN
Qty: 60 TABLET | Refills: 0 | Status: SHIPPED | OUTPATIENT
Start: 2017-12-16 | End: 2018-01-26

## 2017-12-16 RX ORDER — CLINDAMYCIN HCL 300 MG
300 CAPSULE ORAL EVERY 8 HOURS SCHEDULED
Status: DISCONTINUED | OUTPATIENT
Start: 2017-12-16 | End: 2017-12-18 | Stop reason: HOSPADM

## 2017-12-16 RX ORDER — ACETAMINOPHEN 325 MG/1
650 TABLET ORAL EVERY 4 HOURS PRN
Qty: 60 TABLET | Refills: 3 | Status: SHIPPED | OUTPATIENT
Start: 2017-12-18 | End: 2018-01-26

## 2017-12-16 RX ORDER — AMOXICILLIN 250 MG
1-2 CAPSULE ORAL 2 TIMES DAILY
Qty: 60 TABLET | Refills: 3 | Status: SHIPPED | OUTPATIENT
Start: 2017-12-16 | End: 2018-01-26

## 2017-12-16 RX ADMIN — Medication 1 CAPSULE: at 10:19

## 2017-12-16 RX ADMIN — OXYCODONE HYDROCHLORIDE 5 MG: 5 TABLET ORAL at 13:02

## 2017-12-16 RX ADMIN — CLINDAMYCIN HYDROCHLORIDE 300 MG: 300 CAPSULE ORAL at 21:10

## 2017-12-16 RX ADMIN — ACETAMINOPHEN 975 MG: 325 TABLET, FILM COATED ORAL at 18:34

## 2017-12-16 RX ADMIN — SENNOSIDES AND DOCUSATE SODIUM 2 TABLET: 8.6; 5 TABLET ORAL at 21:10

## 2017-12-16 RX ADMIN — ACETAMINOPHEN 975 MG: 325 TABLET, FILM COATED ORAL at 09:58

## 2017-12-16 RX ADMIN — OXYCODONE HYDROCHLORIDE 10 MG: 5 TABLET ORAL at 08:29

## 2017-12-16 RX ADMIN — CLINDAMYCIN HYDROCHLORIDE 300 MG: 300 CAPSULE ORAL at 05:37

## 2017-12-16 RX ADMIN — OXYCODONE HYDROCHLORIDE 5 MG: 5 TABLET ORAL at 16:58

## 2017-12-16 RX ADMIN — OXYCODONE HYDROCHLORIDE 5 MG: 5 TABLET ORAL at 21:14

## 2017-12-16 RX ADMIN — CLINDAMYCIN HYDROCHLORIDE 300 MG: 300 CAPSULE ORAL at 14:23

## 2017-12-16 RX ADMIN — SENNOSIDES AND DOCUSATE SODIUM 2 TABLET: 8.6; 5 TABLET ORAL at 08:29

## 2017-12-16 RX ADMIN — OXYCODONE HYDROCHLORIDE 5 MG: 5 TABLET ORAL at 21:10

## 2017-12-16 RX ADMIN — ACETAMINOPHEN 975 MG: 325 TABLET, FILM COATED ORAL at 02:02

## 2017-12-16 RX ADMIN — OXYCODONE HYDROCHLORIDE 10 MG: 5 TABLET ORAL at 04:13

## 2017-12-16 RX ADMIN — KETOROLAC TROMETHAMINE 15 MG: 30 INJECTION, SOLUTION INTRAMUSCULAR at 13:02

## 2017-12-16 RX ADMIN — SIMETHICONE CHEW TAB 80 MG 80 MG: 80 TABLET ORAL at 04:15

## 2017-12-16 RX ADMIN — Medication 1 CAPSULE: at 21:10

## 2017-12-16 RX ADMIN — OXYCODONE HYDROCHLORIDE 10 MG: 5 TABLET ORAL at 01:05

## 2017-12-16 RX ADMIN — IBUPROFEN 800 MG: 400 TABLET ORAL at 18:34

## 2017-12-16 RX ADMIN — KETOROLAC TROMETHAMINE 15 MG: 30 INJECTION, SOLUTION INTRAMUSCULAR at 05:36

## 2017-12-16 NOTE — PLAN OF CARE
Problem: Patient Care Overview  Goal: Plan of Care/Patient Progress Review  Outcome: Improving  PP assessment WNL. Vital signs stable. Intake and output appropriate remains in place. Utilizing double electric breast pump with assistance. IV-SL. Pain managed with Toradol, tylenol, 10mg Roxicodone and IV Dilaudid x1 dose tonight for incisional discomfort and uterine cramping-see MAR.  is in room, positive support system. Will continue with pt plan of care.

## 2017-12-16 NOTE — OR NURSING
Awake and alert, recovering from a  under Emergent GA  Pt was awake on arrival to OB Pacu  Tap block performed By Dr Lambert and Dr Gibbons, pt tolerated this well  Gyn checks are being done by OB RN Sofia Patino  Presently pt is taking crackers and juice in lieu of having a pain pill soon  Pain is tolerable. Report and transfer of care to Sofia Lambert has been paged for anesthesia sign out but N/A  Will be monitored for a total of 2 hours post partum in the recovery room  Meets PACU discharge criteria.

## 2017-12-16 NOTE — ADDENDUM NOTE
Addendum  created 12/15/17 1823 by Jelena Gibbons MD    Anesthesia Intra Blocks edited, Anesthesia Intra Meds edited, Child order released for a procedure order, Sign clinical note

## 2017-12-16 NOTE — PROGRESS NOTES
Post Partum Progress Note    Subjective:  She is resting comfortably in bed this morning. No specific concerns this morning. Pain is improving and well controlled on current medication regimen. Tolerating PO intake.  Lochia present and minimal.  Voiding without difficulty.  Passing flatus. Ambulating without dizziness or difficulty.  She denies headache, changes in vision, nausea/vomiting, chest pain, shortness of breath, RUQ pain, or worsening edema.  She is pumping.    Objective:  Vitals:    17 0128 17 0541 17 0920 17 1400   BP: 124/76 124/78 115/71 113/70   Pulse:  80     Resp: 17 18 18 18   Temp: 98.5  F (36.9  C) 98.4  F (36.9  C) 98.3  F (36.8  C) 98.4  F (36.9  C)   TempSrc: Oral Oral Oral Oral   SpO2: 97% 98% 95% 96%   Weight:       Height:           General: NAD. A&Ox3.  CV: RRR.  Pulm: CTAB. Normal respiratory effort.  Abd: Soft, non-tender, non-distended. Fundus is firm and below the umbilicus.  Incision c/d/i.  Ext: 1+ edema. No calf tenderness.    Assessment/Plan:  Naheed Crouch is a 27 year old  female who is POD#2 s/p STAT PHTCS and BS under GETA. Stable in the post-operative setting    - Encourage routine post-operative goals including ambulation and incentive spirometry  - PNC: Rh positive. Rubella immune. No intervention indicated.  - Pain: controlled on oral medications  - Heme: Hgb 11.5> > 10.6. Hemodynamically stable.  - CV: GHTN - normotensive in the postpartum period. F/u in one week for blood pressure check.  - GI: continue anti-emetics and stool softeners as needed.  - : Voiding spontaneously. Perineal abscess - continue clindamycin, D#5/  - Infant: Stable in NICU  - Feeding: Plans on bottlefeeding.  - BC: Bilateral salpingectomy    Discharge to home on POD#3     Rola Acevedo MD  OB/GYN Resident, PGY-3  2017 8:40 AM      Appreciate note by Dr. Acevedo. Patient has been seen and examined by me separate from the resident, agree with above note.      Celena Lopez MD  1:01 PM

## 2017-12-16 NOTE — LACTATION NOTE
"D:  I met with Naheed in her postpartum room today.  Luis is her second baby, she did not breastfeed her first.  She is a former smoker, has not smoked in one month and does not intend to restart. Naheed is normally in good health and takes no meds; she showed me an area on the outer aspect of her right breast that she called a \"hair splinter\".    I:  We discussed what that meant.  She is a  and had a piece of hair enter her breast, just like a wood splinter would.  She said it became infected, is now scarred and somewhat hard.  I gave her a folder of introductory materials and went over pumping guidelines.    We talked about hands on pumping techniques, hand expression and how to access the [place] websites. I advised her to call her insurance company about pump coverage. She said her plan is to pump and give her milk via bottle.  I told her our mission is to help her achieve her goal.  A:  Mom now pumping for her  premie.  P:  Will continue to provide lactation support.       Alyx Todd, RNC, IBCLC   "

## 2017-12-16 NOTE — PROVIDER NOTIFICATION
12/15/17 2156   Provider Notification   Provider Name/Title Zanotto   Method of Notification Electronic Page   Request Evaluate-Remote   Notification Reason Other   Please call nurse regarding pt pain control. Thanks!

## 2017-12-16 NOTE — PLAN OF CARE
Patient arrived to [facility] unit via zoom cart at 2000,with belongings, accompanied by family, infant NICU. Received report from Leslie HARDWICK and checked bands. Unit and room orientation started. Call light within arms reach; no concerns present at this time. Continue with plan of care.

## 2017-12-16 NOTE — ANESTHESIA POSTPROCEDURE EVALUATION
Patient: Naheed Crouch    Procedure(s):   - Wound Class: II-Clean Contaminated    Diagnosis:Pregnancy  Diagnosis Additional Information: No value filed.    Anesthesia Type:  No value filed.    Note:  Anesthesia Post Evaluation    Patient location during evaluation: PACU and Bedside  Patient participation: Able to fully participate in evaluation  Level of consciousness: awake and alert  Pain management: adequate  Airway patency: patent  Cardiovascular status: acceptable  Respiratory status: acceptable  Hydration status: acceptable  PONV: none     Anesthetic complications: None          Last vitals:  Vitals:    12/15/17 1751 12/15/17 1754 12/15/17 1755   BP: (!) 135/111  132/81   Pulse:      Resp: 12 12    Temp:      SpO2: 98% 99% 100%         Electronically Signed By: Alda Lambert MD  December 15, 2017  6:14 PM

## 2017-12-16 NOTE — PROVIDER NOTIFICATION
"   12/15/17 6628   Provider Notification   Provider Name/Title Latha   Method of Notification Electronic Page   Request Evaluate-Remote   Notification Reason Other   Please call nurse regarding pt pain control. Thanks!    Discussed with Provider pt pain control provider said OK to administer oral Roxicodone and Dilaudid for adequate pain control. \"Ok to administer Lucero and dilaudid at same time if needed.\"  "

## 2017-12-16 NOTE — PLAN OF CARE
Data: Naheed Crouch transferred to postpartum unit 7103 via cart. Stopped at NICU to see baby.  Action: Receiving unit notified of transfer: Yes. Patient and family notified of room change. Report given to Tabby at bedside. Belongings sent to receiving unit. Accompanied by Registered Nurse. Oriented patient to surroundings. Call light within reach. ID bands double-checked with receiving RN.  Response: Patient reporting increased pain with transfer. MD notified of pain control problems.

## 2017-12-16 NOTE — PROGRESS NOTES
"OB staff POD #1  S;  Tired, passing gas, malone just out    O: /71  Pulse 80  Temp 98.3  F (36.8  C) (Oral)  Resp 18  Ht 1.727 m (5' 8\")  Wt 116 kg (255 lb 12.8 oz)  LMP 05/13/2017  SpO2 95%  Breastfeeding? Unknown  BMI 38.89 kg/m2  gen pumpin supin in bed  ABD: Deven finn gin place  EXT pneumos in plase  Hemoglobin   Date Value Ref Range Status   12/16/2017 10.6 (L) 11.7 - 15.7 g/dL Final   ]  A?P POD #1 from c/s and bilateral salpingestomy  Routine PP cares.  Enc sleep, pumping and activity.      Elo Gil MD    "

## 2017-12-16 NOTE — ANESTHESIA PROCEDURE NOTES
Peripheral Nerve Block Procedure Note    Staff:     Anesthesiologist:  INOCENCIA MCKINNON    Resident/CRNA:  BHARGAVI RIVAS    Block performed by resident/CRNA in the presence of a teaching physician    Location: PACU  Procedure Start/Stop TImes:      12/15/2017 5:50 PM     12/15/2017 6:00 PM    patient identified, IV checked, site marked, risks and benefits discussed, informed consent, monitors and equipment checked, pre-op evaluation, at physician/surgeon's request and post-op pain management      Correct Patient: Yes      Correct Position: Yes      Correct Site: Yes      Correct Procedure: Yes      Correct Laterality:  Yes    Site Marked:  Yes  Procedure details:     Procedure:  TAP    ASA:  2    Laterality:  Bilateral    Position:  Supine    Sterile Prep: chloraprep, patient draped, mask and sterile gloves      Local skin infiltration:  1% lidocaine    amount (mL):  2    Needle:  Short bevel and insulated    Needle gauge:  21    Needle length (mm):  110    Catheter threaded easily: Yes      Ultrasound: Yes      Ultrasound used to identify targeted nerve, plexus, or vascular structure and placed a needle adjacent to it      Permanent Image entered into patiient's record      Abnormal pain on injection: No      Blood Aspirated: No      Paresthesias:  No    Bleeding at site: No      Test dose negative for signs of intravascular injection: Yes      Bolus via:  Needle    Infusion Method:  Single Shot    Blood aspirated via catheter: No      Complications:  None  Assessment/Narrative:     Injection made incrementally with aspirations every (mL):  5

## 2017-12-16 NOTE — PROVIDER NOTIFICATION
12/15/17 2016   Provider Notification   Provider Name/Title Latha   Method of Notification Electronic Page   Request Evaluate-Remote   Notification Reason Medication Request   Pt pain control is inadequate. Unable to administer any other pain medications.    Clinda for abscess discontinued would you like this medication given?    Thanks!

## 2017-12-17 PROCEDURE — 25000132 ZZH RX MED GY IP 250 OP 250 PS 637: Performed by: OBSTETRICS & GYNECOLOGY

## 2017-12-17 PROCEDURE — 12000028 ZZH R&B OB UMMC

## 2017-12-17 RX ORDER — CLINDAMYCIN HCL 300 MG
300 CAPSULE ORAL EVERY 8 HOURS
Qty: 4 CAPSULE | Refills: 0 | Status: SHIPPED | OUTPATIENT
Start: 2017-12-17 | End: 2018-01-26

## 2017-12-17 RX ORDER — POLYETHYLENE GLYCOL 3350 17 G/17G
17 POWDER, FOR SOLUTION ORAL DAILY
Status: DISCONTINUED | OUTPATIENT
Start: 2017-12-17 | End: 2017-12-18 | Stop reason: HOSPADM

## 2017-12-17 RX ADMIN — CLINDAMYCIN HYDROCHLORIDE 300 MG: 300 CAPSULE ORAL at 13:48

## 2017-12-17 RX ADMIN — CLINDAMYCIN HYDROCHLORIDE 300 MG: 300 CAPSULE ORAL at 23:38

## 2017-12-17 RX ADMIN — OXYCODONE HYDROCHLORIDE 5 MG: 5 TABLET ORAL at 06:46

## 2017-12-17 RX ADMIN — ACETAMINOPHEN 975 MG: 325 TABLET, FILM COATED ORAL at 11:34

## 2017-12-17 RX ADMIN — SENNOSIDES AND DOCUSATE SODIUM 2 TABLET: 8.6; 5 TABLET ORAL at 07:52

## 2017-12-17 RX ADMIN — POLYETHYLENE GLYCOL 3350 17 G: 17 POWDER, FOR SOLUTION ORAL at 15:06

## 2017-12-17 RX ADMIN — IBUPROFEN 800 MG: 400 TABLET ORAL at 13:48

## 2017-12-17 RX ADMIN — OXYCODONE HYDROCHLORIDE 5 MG: 5 TABLET ORAL at 23:36

## 2017-12-17 RX ADMIN — OXYCODONE HYDROCHLORIDE 5 MG: 5 TABLET ORAL at 19:23

## 2017-12-17 RX ADMIN — SENNOSIDES AND DOCUSATE SODIUM 2 TABLET: 8.6; 5 TABLET ORAL at 19:22

## 2017-12-17 RX ADMIN — IBUPROFEN 800 MG: 400 TABLET ORAL at 22:11

## 2017-12-17 RX ADMIN — IBUPROFEN 800 MG: 400 TABLET ORAL at 00:43

## 2017-12-17 RX ADMIN — ACETAMINOPHEN 975 MG: 325 TABLET, FILM COATED ORAL at 02:14

## 2017-12-17 RX ADMIN — IBUPROFEN 800 MG: 400 TABLET ORAL at 06:46

## 2017-12-17 RX ADMIN — OXYCODONE HYDROCHLORIDE 5 MG: 5 TABLET ORAL at 03:48

## 2017-12-17 RX ADMIN — Medication 1 CAPSULE: at 07:52

## 2017-12-17 RX ADMIN — ACETAMINOPHEN 975 MG: 325 TABLET, FILM COATED ORAL at 19:23

## 2017-12-17 RX ADMIN — Medication 1 CAPSULE: at 23:38

## 2017-12-17 RX ADMIN — CLINDAMYCIN HYDROCHLORIDE 300 MG: 300 CAPSULE ORAL at 06:46

## 2017-12-17 RX ADMIN — OXYCODONE HYDROCHLORIDE 5 MG: 5 TABLET ORAL at 13:48

## 2017-12-17 RX ADMIN — OXYCODONE HYDROCHLORIDE 5 MG: 5 TABLET ORAL at 17:42

## 2017-12-17 RX ADMIN — OXYCODONE HYDROCHLORIDE 5 MG: 5 TABLET ORAL at 09:51

## 2017-12-17 RX ADMIN — OXYCODONE HYDROCHLORIDE 10 MG: 5 TABLET ORAL at 00:43

## 2017-12-17 NOTE — PLAN OF CARE
Problem: Patient Care Overview  Goal: Plan of Care/Patient Progress Review  Outcome: Improving  Vss, postpartum assessment WDL. Using breast pump every 2 hours independently. Taking pain medications with comfortable results. Visiting infant in NICU. Ambulating in hallway. Given miralax for constipation. Continue routine care.

## 2017-12-17 NOTE — LACTATION NOTE
"This note was copied from a baby's chart.  D:  I met with Naheed; she was experiencing fullness and pain in her upper aspects of both breasts.  I:  I reviewed physiology and treatment of engorgement, and encouraged her to use ice 10-15\" before pumping and take her pain meds as prescribed.  We talked about physiology and treatment of plugged ducts, and when to use heat vs ice.  We talked about different massage techniques.  She and her  have watched several videos on the hospital TV and YouTube on pumping technique.  We talked about use of a hands-free bra and where to buy/ how to make.  She is now getting up to a full bottle per pumping 8-10x/day; I had her switch to Maintain setting.  We talked about function of letdown button.  We talked about pumping at work.  I clarified long term plan is pump and bottle (directly nursing is \"outside my comfort zone, plus I'll be working full time, plus I like to know how much he eats\").  We talked about some tips for long term pumping, and to let us know if she ever did want to directly nurse.  A:  Supply coming in nicely, has some tips for comfort.  P:  Will continue to provide lactation support.    Hina Martin, RNC, IBCLC      "

## 2017-12-17 NOTE — PLAN OF CARE
Problem: Patient Care Overview  Goal: Plan of Care/Patient Progress Review  Outcome: Improving  Vss, postpartum assessments WDL. Tolerating regular diet. Ambulating in room. Using breast pump and doing hand expression every 2 hours. Visiting infant in NICU. Took a shower this shift. Taking pain meds with comfortable results. Voiding and emptying since malone catheter removal. Continue to assist as necessary.

## 2017-12-17 NOTE — PLAN OF CARE
Problem: Patient Care Overview  Goal: Plan of Care/Patient Progress Review  Outcome: Improving  PP assessment WNL. Vital signs stable. Pt up ad theodora, steady gait. Incision sie WNL. Intake and output appropriate. Pain managed with Ibuprofen, tylenol, and 5-10 mg Roxicodone for incisional pain-see MAR. Utilizing double electric breast pump independently. Down to NICU x1 tonight.  in room, positive support. Will continue with pt plan of care.

## 2017-12-18 VITALS
SYSTOLIC BLOOD PRESSURE: 115 MMHG | DIASTOLIC BLOOD PRESSURE: 69 MMHG | RESPIRATION RATE: 18 BRPM | HEART RATE: 92 BPM | BODY MASS INDEX: 38.77 KG/M2 | OXYGEN SATURATION: 96 % | HEIGHT: 68 IN | TEMPERATURE: 97.8 F | WEIGHT: 255.8 LBS

## 2017-12-18 PROCEDURE — 25000132 ZZH RX MED GY IP 250 OP 250 PS 637: Performed by: OBSTETRICS & GYNECOLOGY

## 2017-12-18 RX ADMIN — OXYCODONE HYDROCHLORIDE 5 MG: 5 TABLET ORAL at 03:45

## 2017-12-18 RX ADMIN — OXYCODONE HYDROCHLORIDE 5 MG: 5 TABLET ORAL at 08:46

## 2017-12-18 RX ADMIN — POLYETHYLENE GLYCOL 3350 17 G: 17 POWDER, FOR SOLUTION ORAL at 08:45

## 2017-12-18 RX ADMIN — IBUPROFEN 800 MG: 400 TABLET ORAL at 10:59

## 2017-12-18 RX ADMIN — SENNOSIDES AND DOCUSATE SODIUM 2 TABLET: 8.6; 5 TABLET ORAL at 08:45

## 2017-12-18 RX ADMIN — ACETAMINOPHEN 975 MG: 325 TABLET, FILM COATED ORAL at 03:44

## 2017-12-18 RX ADMIN — Medication 1 CAPSULE: at 08:45

## 2017-12-18 RX ADMIN — CLINDAMYCIN HYDROCHLORIDE 300 MG: 300 CAPSULE ORAL at 08:45

## 2017-12-18 RX ADMIN — IBUPROFEN 800 MG: 400 TABLET ORAL at 04:42

## 2017-12-18 NOTE — PLAN OF CARE
Problem: Patient Care Overview  Goal: Plan of Care/Patient Progress Review  Outcome: Improving  PP assessment WNL. Vital signs stable. Pt up ad theodora, steady gait. Pain managed with Ibuprofen, Tylenol, and 5mg Roxicodone. Incision site WNL. Utilizing double electric breast pump. Breasts engorged-ice packs and wet heat applied. Down to NICU x2 tonight. No further concerns, will continue with pt plan of care.

## 2017-12-18 NOTE — PROGRESS NOTES
Post Partum Progress Note    Subjective:  She is resting comfortably in bed this morning. No specific concerns this morning. Pain is improving and well controlled on current medication regimen. Tolerating normal PO intake.  Lochia present and minimal.  Voiding without difficulty.  Passing flatus. Ambulating without dizziness or difficulty.  She denies headache, changes in vision, nausea/vomiting, chest pain, shortness of breath, RUQ pain, or worsening edema.  She is pumping with excellent output. Baby doing very well in NICU.    Objective:  Vitals:    17 0216 17 0755 17 1923 17 0348   BP: 104/63 116/65 114/74 110/67   Pulse: 96  88 90   Resp:    Temp: 97.7  F (36.5  C) 97.5  F (36.4  C) 97.7  F (36.5  C) 97.7  F (36.5  C)   TempSrc: Oral Oral Oral Oral   SpO2:       Weight:       Height:           General: NAD. A&Ox3.  CV: RRR.  Pulm: CTAB. Normal respiratory effort.  Abd: Soft, non-tender, non-distended. Fundus is firm and below the umbilicus.  Incision c/d/i.  Ext: 2+ edema. No calf tenderness.    Assessment/Plan:  Naheed Crouch is a 27 year old  female who is POD#3 s/p STAT PHTCS and BS under GETA. Stable in the post-operative setting and meeting goals.    - Encourage routine post-operative goals including ambulation and incentive spirometry  - PNC: Rh positive. Rubella immune. No intervention indicated.  - Pain: controlled on oral medications, home on ibuprofen and percocet  - Heme: ABLA, Hgb 11.5> > 10.6. Hemodynamically stable w/ no s/s of ongoing bleeding.  - CV: GHTN - normotensive in the postpartum period w/ no s/s of evolving pre-e. F/u in 3-5d for blood pressure check.  - GI: continue anti-emetics and stool softeners as needed.  - : Voiding spontaneously, appreciate adequate UOP. Perineal abscess - continue clindamycin, D#6/  - Infant: Stable in NICU  - Feeding: Plans on breast feeding  - BC: s/p bilateral salpingectomy    Discharge to home  today    Criselda Azul MD  OB GYN PGY-3    Appreciate note by Dr. Stover. Patient has been seen and examined by me separate from the resident, agree with above note.     Celena Lopez MD  8:53 AM

## 2017-12-18 NOTE — PROGRESS NOTES
[facility]  MATERNAL CHILD HEALTH   SOCIAL WORK PROGRESS NOTE    This writer checked in with  Naheed in postpartum as she delivered baby Luis on Friday. She discussed the stress associated with the emergent c/s. However, is grateful that Luis is doing well. She will be discharged today and plans to go home, as she has not been home for some time. She denied having any questions about the NICU. Reported no concerns with her mood. This writer did provided education bout PMAD's and offered PPSM resource. This writer will continue to follow while Luis is in the NICU. Social work will continue to assess needs and provide ongoing psychosocial support and access to resources.     HARRISON Tang, [facility]   Social Worker  Maternal Child Health   Phone: [phone]  Pager: [phone]

## 2018-01-11 LAB — COPATH REPORT: NORMAL

## 2018-01-26 ENCOUNTER — PRENATAL OFFICE VISIT (OUTPATIENT)
Dept: OBGYN | Facility: CLINIC | Age: 28
End: 2018-01-26
Payer: COMMERCIAL

## 2018-01-26 VITALS
HEART RATE: 91 BPM | DIASTOLIC BLOOD PRESSURE: 80 MMHG | TEMPERATURE: 97.7 F | BODY MASS INDEX: 38.58 KG/M2 | SYSTOLIC BLOOD PRESSURE: 139 MMHG | HEIGHT: 68 IN | WEIGHT: 254.6 LBS

## 2018-01-26 PROBLEM — Z34.80 ENCOUNTER FOR SUPERVISION OF OTHER NORMAL PREGNANCY: Status: RESOLVED | Noted: 2017-07-31 | Resolved: 2018-01-26

## 2018-01-26 PROBLEM — O41.8X20: Status: RESOLVED | Noted: 2017-08-31 | Resolved: 2018-01-26

## 2018-01-26 ASSESSMENT — PAIN SCALES - GENERAL: PAINLEVEL: NO PAIN (0)

## 2018-01-26 NOTE — NURSING NOTE
"Chief Complaint   Patient presents with     Post Partum Exam       Initial /80  Pulse 91  Temp 97.7  F (36.5  C) (Oral)  Ht 5' 8.25\" (1.734 m)  Wt 254 lb 9.6 oz (115.5 kg)  LMP 05/13/2017  Breastfeeding? Yes  BMI 38.43 kg/m2 Estimated body mass index is 38.43 kg/(m^2) as calculated from the following:    Height as of this encounter: 5' 8.25\" (1.734 m).    Weight as of this encounter: 254 lb 9.6 oz (115.5 kg)..  BP completed using cuff size: [facility] CMA    "

## 2018-01-26 NOTE — PROGRESS NOTES
Naheed is here for a postpartum checkup.  Pregnancy was: Complicated by PPROM at 26 weeks, gestational HTN, marginal abruption and fetal bradycardia.    She had a High Transverse c/section and bilateral salpingectomy.    Obstetric History       T1      L2     SAB1   TAB0   Ectopic0   Multiple0   Live Births2       # Outcome Date GA Lbr Mario/2nd Weight Sex Delivery Anes PTL Lv   3  12/15/17 30w6d  2 lb 13 oz (1.276 kg) M CS-Classical  N ISAURA      Name: CARLOTA,MARLEN MISHRA      Complications: Prolonged PROM (>18 hours)      Apgar1:  5                Apgar5: 8   2 SAB 13           1 Term 12 37w1d 09:00 / 00:45 5 lb 13 oz (2.637 kg) M  EPI  ISAURA      Name: Xu      Apgar1:  9                Apgar5: 9         Since delivery, she has been pumping as son remains in NICU.  She has not had a normal menses.  She has not had intercourse.  Patient screened for postpartum depression and complaints are NEGATIVE. Screening has also been completed for intimate partner violence. She would like to discuss returning to work-needs letter.    O: This is a well appearing female in no acute distress. Answers questions and maintains eye contact appropriately. Vital signs noted.  RESPIRATORY: Clear to auscultation bilaterally.  CV: Regular rate and rhythm without murmur, gallop, rub  ABDOMEN: Soft, nontender, nondistended, normoactive bowel sounds. No hepatosplenomegaly. No guarding, rebounding, or rigidity.  Vulva: No external lesions, normal hair distribution, no adenopathy  BUS:  Normal, no masses noted  Vagina: Moist, pink, no abnormal discharge, well rugated, no lesions  Cervix: Pink, parous, midline. Without cervical motion tenderness.  Uterus: Normal size and shape, non-tender, mobile  Ovaries: No masses, non-tender, mobile    A/P  Routine Postpartum     - I discussed the new pap recommendations regarding screening.  Explained the rationale for increased intervals between paps.  Questions  asked and answered.  She does agree to this regiment.   - Pap was not performed-aware of need to return to clinic this summer to have pap smear completed.   - Contraception: salpingectomy   - Letter given to return to work    Rain BUSCH CNP

## 2018-01-26 NOTE — MR AVS SNAPSHOT
"              After Visit Summary   1/26/2018    Naheed Cee    MRN: [id number]           Patient Information     Date Of Birth          1990        Visit Information        Provider Department      1/26/2018 8:50 AM Rain Yip APRN CNP [facility]        Today's Diagnoses     Routine postpartum follow-up    -  1       Follow-ups after your visit        Who to contact     If you have questions or need follow up information about today's clinic visit or your schedule please contact [facility] directly at [phone].  Normal or non-critical lab and imaging results will be communicated to you by Mobicowhart, letter or phone within 4 business days after the clinic has received the results. If you do not hear from us within 7 days, please contact the clinic through Nevolutiont or phone. If you have a critical or abnormal lab result, we will notify you by phone as soon as possible.  Submit refill requests through Shahiya or call your pharmacy and they will forward the refill request to us. Please allow 3 business days for your refill to be completed.          Additional Information About Your Visit        MyChart Information     Shahiya gives you secure access to your electronic health record. If you see a primary care provider, you can also send messages to your care team and make appointments. If you have questions, please call your primary care clinic.  If you do not have a primary care provider, please call [phone] and they will assist you.        Care EveryWhere ID     This is your Care EveryWhere ID. This could be used by other organizations to access your [place] medical records  [id number]        Your Vitals Were     Pulse Temperature Height Last Period Breastfeeding? BMI (Body Mass Index)    91 97.7  F (36.5  C) (Oral) 5' 8.25\" (1.734 m) 05/13/2017 Yes 38.43 kg/m2       Blood Pressure from Last 3 Encounters:   01/26/18 139/80   12/18/17 115/69   11/13/17 111/73    " Weight from Last 3 Encounters:   01/26/18 254 lb 9.6 oz (115.5 kg)   12/15/17 255 lb 12.8 oz (116 kg)   11/13/17 243 lb (110.2 kg)              Today, you had the following     No orders found for display         Today's Medication Changes          These changes are accurate as of 1/26/18  9:22 AM.  If you have any questions, ask your nurse or doctor.               Stop taking these medicines if you haven't already. Please contact your care team if you have questions.     acetaminophen 325 MG tablet   Commonly known as:  TYLENOL   Stopped by:  Rain Yip APRN CNP           clindamycin 300 MG capsule   Commonly known as:  CLEOCIN   Stopped by:  Rain Yip APRN CNP           ibuprofen 400 MG tablet   Commonly known as:  ADVIL/MOTRIN   Stopped by:  Rain Yip APRN CNP           oxyCODONE IR 5 MG tablet   Commonly known as:  ROXICODONE   Stopped by:  Rain Yip APRN CNP           senna-docusate 8.6-50 MG per tablet   Commonly known as:  SENOKOT-S;PERICOLACE   Stopped by:  Rain Yip APRN CNP                    Primary Care Provider Office Phone # Fax #    Jgtwrh Mawuena Agbeh, [phone]       [address] [place] [address]        Equal Access to Services     [facility]: Hadii aad ku hadasho Soomaali, waaxda luqadaha, qaybta kaalmada adeegyada, kevin waldron . So [facility] [phone].    ATENCIÓN: Si habla español, tiene a ledezma disposición servicios gratuitos de asistencia lingüística. Llame al [phone].    We comply with applicable federal civil rights laws and [place] laws. We do not discriminate on the basis of race, color, national origin, age, disability, sex, sexual orientation, or gender identity.            Thank you!     Thank you for choosing [facility]  [facility] your [facility]. Our goal is always to provide you with excellent care. Hearing back from our patients is one way we can  continue to improve our services. Please take a few minutes to complete the written survey that you may receive in the mail after your visit with us. Thank you!             Your Updated Medication List - Protect others around you: Learn how to safely use, store and throw away your medicines at www.disposemymeds.org.          This list is accurate as of 1/26/18  9:22 AM.  Always use your most recent med list.                   Brand Name Dispense Instructions for use Diagnosis    PRENATAL VITAMIN PO      Take 1 tablet by mouth daily

## 2018-01-26 NOTE — LETTER
[facility]  [address] [place] [address]  [phone]      RE: Naheed Cee  : 1990    DATE: 2018      To Whom It May Concern,        Naheed Cee was seen in the clinic today. She is able to return to work without restrictions at this time.        Thank you.      Sincerely,            Rain BUSCH CNP

## 2018-01-27 ASSESSMENT — PATIENT HEALTH QUESTIONNAIRE - PHQ9: SUM OF ALL RESPONSES TO PHQ QUESTIONS 1-9: 0

## 2018-05-02 NOTE — PROGRESS NOTES
HPI:    I am seeing Naheed Cee for the 1st time. she is a 28 year old female here to discuss:    Bilateral carpal tunnel syndrome - she had symptoms a while ago but seemed to improve. Again worsened in the last couple of months. Of not she had her baby prematurely about 4 months ago. Symptoms are numbness on the 1st 3 fingers along with weakness. She works as a .  Evaluation and treatment:    Discussed options.   We will get EMG and have her see ortho.    Exam:    /80 (Cuff Size: Adult Large)  Pulse 78  Temp 98.1  F (36.7  C) (Oral)  Wt 280 lb (127 kg)  SpO2 98%  Breastfeeding? No  BMI 42.26 kg/m2    Gen: Healthy appearing female in no acute distress  CV: radial pulses normal. Tips of the fingers are warm with good capillary refill.  MS: full ROM at the wrists and finger without swelling or skin changes.  Neuro: Positive Tinel both sides, negative Phalen. No weakness objectively.      Assessment and Plan - Decision Making    1. Bilateral carpal tunnel syndrome  Per HPI  - ORTHOPEDICS ADULT REFERRAL  - NEUROLOGY ADULT REFERRAL      Written instructions given as follows:    Patient Instructions   1. Set up EMG - [facility] [phone] - tell them this is only for EMG - no need for consult.    2. After the EMG set up ortho consult - [phone].    3. I recommend including veggies, fresh fruits (3 to 5 servings), nuts (unsalted) in your daily diet. Cut down on carbs like bread, pasta, rice, potatoes. Cut down on red meats like burgers etc. Increase healthy proteins like beans, tofu, tuna, chicken and turkey.    4. Get regular exercise like jogging, biking, swimming at least 3 times per week.

## 2018-05-03 ENCOUNTER — OFFICE VISIT (OUTPATIENT)
Dept: FAMILY MEDICINE | Facility: CLINIC | Age: 28
End: 2018-05-03
Payer: COMMERCIAL

## 2018-05-03 VITALS
SYSTOLIC BLOOD PRESSURE: 128 MMHG | OXYGEN SATURATION: 98 % | WEIGHT: 280 LBS | HEART RATE: 78 BPM | DIASTOLIC BLOOD PRESSURE: 80 MMHG | TEMPERATURE: 98.1 F | BODY MASS INDEX: 42.26 KG/M2

## 2018-05-03 DIAGNOSIS — G56.03 BILATERAL CARPAL TUNNEL SYNDROME: Primary | ICD-10-CM

## 2018-05-03 PROCEDURE — 99213 OFFICE O/P EST LOW 20 MIN: CPT | Performed by: FAMILY MEDICINE

## 2018-05-03 NOTE — NURSING NOTE
"Chief Complaint   Patient presents with     Musculoskeletal Problem     wrist pain      Edema     legs       Initial /80 (Cuff Size: Adult Large)  Pulse 78  Temp 98.1  F (36.7  C) (Oral)  Wt 280 lb (127 kg)  SpO2 98%  Breastfeeding? No  BMI 42.26 kg/m2 Estimated body mass index is 42.26 kg/(m^2) as calculated from the following:    Height as of 1/26/18: 5' 8.25\" (1.734 m).    Weight as of this encounter: 280 lb (127 kg).      Rola Felix LPN    "

## 2018-05-03 NOTE — MR AVS SNAPSHOT
After Visit Summary   5/3/2018    Naheed Cee    MRN: [id number]           Patient Information     Date Of Birth          1990        Visit Information        Provider Department      5/3/2018 1:10 PM Rocael Her MD [facility]        Today's Diagnoses     Bilateral carpal tunnel syndrome    -  1      Care Instructions    1. Set up EMG - [facility] [phone] - tell them this is only for EMG - no need for consult.    2. After the EMG set up ortho consult - [phone].    3. I recommend including veggies, fresh fruits (3 to 5 servings), nuts (unsalted) in your daily diet. Cut down on carbs like bread, pasta, rice, potatoes. Cut down on red meats like burgers etc. Increase healthy proteins like beans, tofu, tuna, chicken and turkey.    4. Get regular exercise like jogging, biking, swimming at least 3 times per week.                       Follow-ups after your visit        Additional Services     NEUROLOGY ADULT REFERRAL       Your provider has referred you to: [facility]: [facility] [place] [place] [phone] - this is only for EMG - no need for consult.    Please be aware that coverage of these services is subject to the terms and limitations of your health insurance plan.  Call member services at your health plan with any benefit or coverage questions.      Please bring the following to your appointment:    >>   Any x-rays, CTs or MRIs which have been performed.  Contact the facility where they were done to arrange for  prior to your scheduled appointment.  Any new CT, MRI or other procedures ordered by your specialist must be performed at a [facility] or coordinated by your clinic's referral office.    >>   List of current medications   >>   This referral request   >>   Any documents/labs given to you for this referral            ORTHOPEDICS ADULT REFERRAL       Your provider has referred you to: [place]: Shannon Arceo  [facility] [phone]   [email or web address]    Please be aware that coverage of these services is subject to the terms and limitations of your health insurance plan.  Call member services at your health plan with any benefit or coverage questions.      Please bring the following to your appointment:    >>   Any x-rays, CTs or MRIs which have been performed.  Contact the facility where they were done to arrange for  prior to your scheduled appointment.  Any new CT, MRI or other procedures ordered by your specialist must be performed at a [facility] facility or coordinated by your clinic's referral office.    >>   List of current medications   >>   This referral request   >>   Any documents/labs given to you for this referral                  Who to contact     If you have questions or need follow up information about today's clinic visit or your schedule please contact [facility] directly at [phone].  Normal or non-critical lab and imaging results will be communicated to you by MyChart, letter or phone within 4 business days after the clinic has received the results. If you do not hear from us within 7 days, please contact the clinic through Kivrahart or phone. If you have a critical or abnormal lab result, we will notify you by phone as soon as possible.  Submit refill requests through [email or web address] or call your pharmacy and they will forward the refill request to us. Please allow 3 business days for your refill to be completed.          Additional Information About Your Visit        Kivraharditlo Information     [email or web address] gives you secure access to your electronic health record. If you see a primary care provider, you can also send messages to your care team and make appointments. If you have questions, please call your primary care clinic.  If you do not have a primary care provider, please call [phone] and they will assist you.        Care EveryWhere ID     This is your  Care EveryWhere ID. This could be used by other organizations to access your [facility] medical records  [id number]        Your Vitals Were     Pulse Temperature Pulse Oximetry Breastfeeding? BMI (Body Mass Index)       78 98.1  F (36.7  C) (Oral) 98% No 42.26 kg/m2        Blood Pressure from Last 3 Encounters:   05/03/18 128/80   01/26/18 139/80   12/18/17 115/69    Weight from Last 3 Encounters:   05/03/18 280 lb (127 kg)   01/26/18 254 lb 9.6 oz (115.5 kg)   12/15/17 255 lb 12.8 oz (116 kg)              We Performed the Following     NEUROLOGY ADULT REFERRAL     ORTHOPEDICS ADULT REFERRAL        Primary Care Provider Office Phone # Fax #    Bridget Mawuena Agbeh, [phone] [address] [place] [address]        Equal Access to Services     [facility] : Hadii aad ku hadasho Soomaali, waaxda luqadaha, qaybta kaalmada adeegyada, waxay morenain hayabhishekn goldie waldron . So [facility] [phone].    ATENCIÓN: Si habla español, tiene a ledezma disposición servicios gratuitos de asistencia lingüística. Llingrid al [phone].    We comply with applicable federal civil rights laws and [place] laws. We do not discriminate on the basis of race, color, national origin, age, disability, sex, sexual orientation, or gender identity.            Thank you!     Thank you for choosing [facility]  for your [facility]. Our goal is always to provide you with excellent care. Hearing back from our patients is one way we can continue to improve our services. Please take a few minutes to complete the written survey that you may receive in the mail after your visit with us. Thank you!             Your Updated Medication List - Protect others around you: Learn how to safely use, store and throw away your medicines at www.disposemymeds.org.      Notice  As of 5/3/2018  1:46 PM    You have not been prescribed any medications.

## 2018-05-03 NOTE — PATIENT INSTRUCTIONS
1. Set up EMG - [facility] [phone] - tell them this is only for EMG - no need for consult.    2. After the EMG set up ortho consult - [phone].    3. I recommend including veggies, fresh fruits (3 to 5 servings), nuts (unsalted) in your daily diet. Cut down on carbs like bread, pasta, rice, potatoes. Cut down on red meats like burgers etc. Increase healthy proteins like beans, tofu, tuna, chicken and turkey.    4. Get regular exercise like jogging, biking, swimming at least 3 times per week.

## 2018-05-04 ENCOUNTER — TELEPHONE (OUTPATIENT)
Dept: FAMILY MEDICINE | Facility: CLINIC | Age: 28
End: 2018-05-04

## 2018-05-04 ENCOUNTER — TELEPHONE (OUTPATIENT)
Dept: UROLOGY | Facility: CLINIC | Age: 28
End: 2018-05-04

## 2018-05-04 DIAGNOSIS — G56.03 BILATERAL CARPAL TUNNEL SYNDROME: Primary | ICD-10-CM

## 2018-05-04 NOTE — TELEPHONE ENCOUNTER
Pt has not ever been seen by Dr. Mccain or in urology clinic. Please redirect.    Courtney Melgoza RN, BSN, PHN  [facility]  GI/Gen Surg/Hepatology Care Coordinator

## 2018-05-04 NOTE — TELEPHONE ENCOUNTER
Diane states the the order was for carpal tunnel and not an EMG, so they need a new one that has NEU5 on it for the EMG.    Thank you.

## 2018-05-04 NOTE — TELEPHONE ENCOUNTER
[facility] Call Center    Phone Message    May a detailed message be left on voicemail: yes    Reason for Call: Other: Patient returning clinics call-please call to advise.     Action Taken: Message routed to:  Adult Clinics: Urology p [phone]

## 2018-06-08 ENCOUNTER — OFFICE VISIT (OUTPATIENT)
Dept: NEUROLOGY | Facility: CLINIC | Age: 28
End: 2018-06-08
Attending: FAMILY MEDICINE
Payer: COMMERCIAL

## 2018-06-08 DIAGNOSIS — G56.03 BILATERAL CARPAL TUNNEL SYNDROME: Primary | ICD-10-CM

## 2018-06-08 PROCEDURE — 95910 NRV CNDJ TEST 7-8 STUDIES: CPT | Performed by: PSYCHIATRY & NEUROLOGY

## 2018-06-08 NOTE — MR AVS SNAPSHOT
After Visit Summary   6/8/2018    Naheed Cee    MRN: [id number]           Patient Information     Date Of Birth          1990        Visit Information        Provider Department      6/8/2018 3:00 PM Mario Nunn MD; PROC RM 1 PEDS [facility]        Today's Diagnoses     Bilateral carpal tunnel syndrome    -  1       Follow-ups after your visit        Who to contact     If you have questions or need follow up information about today's clinic visit or your schedule please contact [facility] directly at [phone].  Normal or non-critical lab and imaging results will be communicated to you by Inpria Corporationhart, letter or phone within 4 business days after the clinic has received the results. If you do not hear from us within 7 days, please contact the clinic through iPerceptionst or phone. If you have a critical or abnormal lab result, we will notify you by phone as soon as possible.  Submit refill requests through Cabify or call your pharmacy and they will forward the refill request to us. Please allow 3 business days for your refill to be completed.          Additional Information About Your Visit        MyChart Information     Cabify gives you secure access to your electronic health record. If you see a primary care provider, you can also send messages to your care team and make appointments. If you have questions, please call your primary care clinic.  If you do not have a primary care provider, please call [phone] and they will assist you.      Cabify is an electronic gateway that provides easy, online access to your medical records. With Cabify, you can request a clinic appointment, read your test results, renew a prescription or communicate with your care team.     To access your existing account, please contact your [facility] or call [phone] for assistance.        Care EveryWhere ID     This is your Care EveryWhere ID.  This could be used by other organizations to access your [place] medical records  [id number]         Blood Pressure from Last 3 Encounters:   05/03/18 128/80   01/26/18 139/80   12/18/17 115/69    Weight from Last 3 Encounters:   05/03/18 127 kg (280 lb)   01/26/18 115.5 kg (254 lb 9.6 oz)   12/15/17 116 kg (255 lb 12.8 oz)              We Performed the Following     NCS Motor with or without F-Wave, 7-8 nerves ([id number])        Primary Care Provider Office Phone # Fax #    Bridget Mawuena Agbeh, [phone] [address] [place] [address]        Equal Access to Services     JACKI BROWN : Hadii ryan hankins hadasho Soomaali, waaxda luqadaha, qaybta kaalmada adeegyada, kevin dennis hayalena waldron . So [facility] [phone].    ATENCIÓN: Si habla español, tiene a ledezma disposición servicios gratuitos de asistencia lingüística. Llame al [phone].    We comply with applicable federal civil rights laws and [place] laws. We do not discriminate on the basis of race, color, national origin, age, disability, sex, sexual orientation, or gender identity.            Thank you!     Thank you for choosing [facility]  for your care. Our goal is always to provide you with excellent care. Hearing back from our patients is one way we can continue to improve our services. Please take a few minutes to complete the written survey that you may receive in the mail after your visit with us. Thank you!             Your Updated Medication List - Protect others around you: Learn how to safely use, store and throw away your medicines at www.disposemymeds.org.      Notice  As of 6/8/2018  4:24 PM    You have not been prescribed any medications.

## 2018-06-08 NOTE — LETTER
6/8/2018         RE: Naheed Cee  [address] [address] [address]        Dear Colleague,    Thank you for referring your patient, Naheed Cee, to the [facility]. Please see a copy of my visit note below.      [facility] EMG Laboratory      Nerve Conduction & EMG Report          Patient:       Naheed Cee  Patient ID:    [id number]  Gender:        Female  YOB: 1990  Age:           28 Years 4 Months      History and Examination:  Naheed Cee is a 28 year old woman with numbness in the second digit, precipitated by use of the hands. Examination is notable for normal  bulk and strength. She is referred for evaluation of possible bilateral median neuropathy at the wrist.     Techniques:  Motor conduction studies were done with surface recording electrodes. Sensory conduction studies were performed with surface electrodes, unless indicated otherwise by (n), designating the use of subdermal recording electrodes. Temperature was monitored and recorded throughout the study. Upper extremities were maintained at a temperature of 32 degrees Centigrade or higher.  Lower extremities were maintained at a temperature of 31degrees Centigrade or higher. EMG was done with a concentric needle electrode.     Results:  A left median sensory conduction study demonstrated mild attenuation of amplitude and mild to moderate slowing of conduction, accentuated in the transcarpal segment. A right median sensory conduction study demonstrated mild slowing of conduction, accentuated in the transcarpal segment. Bilateral ulnar sensory conduction studies were normal. Bilateral median and ulnar motor conduction studies demonstrated no absolute abnormalities but did demonstrate relative prolongation of median distal latencies when compared with ulnar distal latencies.    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    Mario Nunn MD                    Sensory NCS      Nerve / Sites Rec. Site Onset Peak NP Amp Ref. PP Amp Dist Amrit Ref. Temp     ms ms  V  V  V cm m/s m/s  C   L MEDIAN - Dig II      Dig II Wrist 3.44 4.43 6.7 10.0 9.3 14 40.7 48.0 34.4      Palm Wrist 2.19 3.07 9.5  11.9 8 36.6 48.0 34.1   R MEDIAN - Dig II      Dig II Wrist 2.97 3.59 10.6 10.0 13.5 14 47.2 48.0 34      Palm Wrist 1.98 2.55 22.2  62.2 8 40.4 48.0 34   L ULNAR - Dig V      Dig V Wrist 2.14 2.66 12.9 8.0 15.0 12.5 58.5 48.0 34.3      Palm Wrist 1.25 1.72 20.7  34.8 8 64.0 48.0 34.5   R ULNAR - Dig V      Dig V Wrist 2.08 2.71 13.2 8.0 13.0 12.5 60.0 48.0 33.5      Palm Wrist 1.20 1.67 31.8  44.0 8 66.8 48.0 33.6       Motor NCS      Nerve / Sites Rec. Site Lat Ref. Amp Ref. Rel Amp Dist Amrit Ref. Dur. Area Temp.     ms ms mV mV % cm m/s m/s ms %  C   L MEDIAN - APB      Wrist APB 3.96 4.40 12.0 5.0 100 8   5.68 100 34.5      Elbow APB 7.97  11.2  93.7 22.5 56.1 48.0 5.78 86.2 34.5   R MEDIAN - APB      Wrist APB 4.11 4.40 9.7 5.0 100 8   4.53 100 33.9      Elbow APB 8.02  9.5  97.9 22.5 57.6 48.0 4.90 93.5 34   L ULNAR - ADM      Wrist ADM 2.29 3.50 12.1 5.0 100 8   6.30 100 34.5      B.Elbow ADM 5.78  11.7  97 21 60.2 48.0 6.41 99.1 34.5      A.Elbow ADM 7.24  10.9  90.3 9 61.7 48.0 6.51 90.6 34.5   R ULNAR - ADM      Wrist ADM 2.50 3.50 11.6 5.0 100 8   5.21 100 33.5      B.Elbow ADM 5.94  10.3  88.4 20.5 59.6 48.0 5.10 94.5 33.4      A.Elbow ADM 7.60  10.1  86.4 9 54.0 48.0 5.31 92.6 33.4                            Again, thank you for allowing me to participate in the care of your patient.        Sincerely,        Mario Nunn MD

## 2018-06-08 NOTE — PROGRESS NOTES
[facility] EMG Laboratory      Nerve Conduction & EMG Report          Patient:       Naheed Cee  Patient ID:    [id number]  Gender:        Female  YOB: 1990  Age:           28 Years 4 Months      History and Examination:  Naheed Cee is a 28 year old woman with numbness in the second digit, precipitated by use of the hands. Examination is notable for normal  bulk and strength. She is referred for evaluation of possible bilateral median neuropathy at the wrist.     Techniques:  Motor conduction studies were done with surface recording electrodes. Sensory conduction studies were performed with surface electrodes, unless indicated otherwise by (n), designating the use of subdermal recording electrodes. Temperature was monitored and recorded throughout the study. Upper extremities were maintained at a temperature of 32 degrees Centigrade or higher.  Lower extremities were maintained at a temperature of 31degrees Centigrade or higher. EMG was done with a concentric needle electrode.     Results:  A left median sensory conduction study demonstrated mild attenuation of amplitude and mild to moderate slowing of conduction, accentuated in the transcarpal segment. A right median sensory conduction study demonstrated mild slowing of conduction, accentuated in the transcarpal segment. Bilateral ulnar sensory conduction studies were normal. Bilateral median and ulnar motor conduction studies demonstrated no absolute abnormalities but did demonstrate relative prolongation of median distal latencies when compared with ulnar distal latencies.    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    Mario Nunn MD                    Sensory NCS      Nerve / Sites Rec. Site Onset Peak NP Amp Ref. PP Amp Dist Amrit Ref. Temp     ms ms  V  V  V cm m/s m/s  C   L MEDIAN - Dig II      Dig II Wrist 3.44  4.43 6.7 10.0 9.3 14 40.7 48.0 34.4      Palm Wrist 2.19 3.07 9.5  11.9 8 36.6 48.0 34.1   R MEDIAN - Dig II      Dig II Wrist 2.97 3.59 10.6 10.0 13.5 14 47.2 48.0 34      Palm Wrist 1.98 2.55 22.2  62.2 8 40.4 48.0 34   L ULNAR - Dig V      Dig V Wrist 2.14 2.66 12.9 8.0 15.0 12.5 58.5 48.0 34.3      Palm Wrist 1.25 1.72 20.7  34.8 8 64.0 48.0 34.5   R ULNAR - Dig V      Dig V Wrist 2.08 2.71 13.2 8.0 13.0 12.5 60.0 48.0 33.5      Palm Wrist 1.20 1.67 31.8  44.0 8 66.8 48.0 33.6       Motor NCS      Nerve / Sites Rec. Site Lat Ref. Amp Ref. Rel Amp Dist Amrit Ref. Dur. Area Temp.     ms ms mV mV % cm m/s m/s ms %  C   L MEDIAN - APB      Wrist APB 3.96 4.40 12.0 5.0 100 8   5.68 100 34.5      Elbow APB 7.97  11.2  93.7 22.5 56.1 48.0 5.78 86.2 34.5   R MEDIAN - APB      Wrist APB 4.11 4.40 9.7 5.0 100 8   4.53 100 33.9      Elbow APB 8.02  9.5  97.9 22.5 57.6 48.0 4.90 93.5 34   L ULNAR - ADM      Wrist ADM 2.29 3.50 12.1 5.0 100 8   6.30 100 34.5      B.Elbow ADM 5.78  11.7  97 21 60.2 48.0 6.41 99.1 34.5      A.Elbow ADM 7.24  10.9  90.3 9 61.7 48.0 6.51 90.6 34.5   R ULNAR - ADM      Wrist ADM 2.50 3.50 11.6 5.0 100 8   5.21 100 33.5      B.Elbow ADM 5.94  10.3  88.4 20.5 59.6 48.0 5.10 94.5 33.4      A.Elbow ADM 7.60  10.1  86.4 9 54.0 48.0 5.31 92.6 33.4

## 2018-06-12 NOTE — PROGRESS NOTES
SUBJECTIVE:  Naheed Cee is a 28 year old female who is seen in consultation at the request of Dr. Rocael Her for evaluation of bilateral wrist numbness and tingling that has been present approximately 3 or 4 years. No known injury.    Present symptoms: She reports that she has persistent numbness and tingling in the second third and fifth fingers of both hands that occurs she uses her hands a lot, particularly at work as a hairstylist.  She normally does not have pain, but reports that when the seasons change from summer to winter and then from winter to summer, thus twice a year, she gets pain at the base of the thumb and up into the volar forearm.    Review of Systems:  Constitutional:  NEGATIVE for fever, chills, change in weight  Integumentary/Skin:  NEGATIVE for worrisome rashes, moles or lesions  Eyes:  NEGATIVE for vision changes or irritation  ENT/Mouth:  NEGATIVE for ear, mouth and throat problems  Resp:  NEGATIVE for significant cough or SOB  Breast:  NEGATIVE for masses, tenderness or discharge  CV:  NEGATIVE for chest pain, palpitations or peripheral edema  GI:  NEGATIVE for nausea, abdominal pain, heartburn, or change in bowel habits  :  Negative   Musculoskeletal:  See HPI above  Neuro:  NEGATIVE for weakness, dizziness or paresthesias  Endocrine:  NEGATIVE for temperature intolerance, skin/hair changes  Heme/allergy/immune:  NEGATIVE for bleeding problems  Psychiatric:  NEGATIVE for changes in mood or affect    Past Medical History:   Past Medical History:   Diagnosis Date     Anemia      ASCUS with positive high risk HPV 2012    types 16, 53 & 59     H/O colposcopy with cervical biopsy 2013    PAUL 2 & 3     Tobacco use disorder      Past Surgical History:   Past Surgical History:   Procedure Laterality Date      SECTION N/A 12/15/2017    Procedure:  SECTION;;  Surgeon: Larissa Doyle MD;  Location:  L+D     LEEP TX, CERVICAL  2013    PAUL 3     SALPINGECTOMY    "   Done with C/Section 12/2017     Family History:   Family History   Problem Relation Age of Onset     Hypertension Father      HEART DISEASE Maternal Grandmother      HEART DISEASE Maternal Grandfather      Breast Cancer Paternal Grandmother      Social History:   Social History   Substance Use Topics     Smoking status: Former Smoker     Packs/day: 0.50     Years: 14.00     Types: Cigarettes     Quit date: 11/16/2017     Smokeless tobacco: Never Used     Alcohol use No     OBJECTIVE:  Physical Exam:  /80  Pulse 67  Resp 12  Ht 1.727 m (5' 8\")  Wt 127 kg (280 lb)  SpO2 100%  BMI 42.57 kg/m2  General Appearance: healthy, alert and no distress   Skin: no suspicious lesions or rashes  Neuro:  strength: good  Thenar fasciculations: slight  Thenar atrophy: none  Sensation: intact in all digits  Vascular: good pulses, and cappillary refill   Lymph: no lymphadenopathy   Psych:  mentation appears normal and affect normal/bright  Resp: no increased work of breathing    Bilateral Wrist/Hand Exam:  Range of Motion wrist, digits: All Normal  Special tests: Tinel's: negative  Phalen's: negative.    EMG:   From 06/08/18 of the bilateral upper extremity showed electrophysiologic evidence of mild bilateral median neuropathy at the wrist.    ASSESSMENT:   Bilateral carpal tunnel syndrome - mild.    PLAN:   We talked about the options, which included activity modification,splinting, corticosteroid injection, medrol dose pack, and CTR.  Carpal tunnel release is not indicated in most cases of mild carpal tunnel syndrome, and certainly not in her situation time.  We decided to proceed with night bracing and a home stretching program as well as anti-inflammatories.  If this is not successful we could consider a ultrasound-guided carpal tunnel steroid injection in 1 or both wrists..    Return to clinic as needed     YANICK Arias MD  Dept. Orthopedic Surgery  [facility]      "

## 2018-06-13 ENCOUNTER — OFFICE VISIT (OUTPATIENT)
Dept: ORTHOPEDICS | Facility: CLINIC | Age: 28
End: 2018-06-13
Payer: COMMERCIAL

## 2018-06-13 VITALS
BODY MASS INDEX: 42.44 KG/M2 | WEIGHT: 280 LBS | SYSTOLIC BLOOD PRESSURE: 125 MMHG | HEIGHT: 68 IN | OXYGEN SATURATION: 100 % | RESPIRATION RATE: 12 BRPM | DIASTOLIC BLOOD PRESSURE: 80 MMHG | HEART RATE: 67 BPM

## 2018-06-13 DIAGNOSIS — G56.03 BILATERAL CARPAL TUNNEL SYNDROME: Primary | ICD-10-CM

## 2018-06-13 PROCEDURE — 99243 OFF/OP CNSLTJ NEW/EST LOW 30: CPT | Performed by: ORTHOPAEDIC SURGERY

## 2018-06-13 NOTE — LETTER
6/13/2018         RE: Naheed Cee  [address] [address] [facility] [address]        Dear Colleague,    Thank you for referring your patient, Naheed Cee, to the [facility]. Please see a copy of my visit note below.    SUBJECTIVE:  Naheed Cee is a 28 year old female who is seen in consultation at the request of Dr. Rocael Her for evaluation of bilateral wrist numbness and tingling that has been present approximately 3 or 4 years. No known injury.    Present symptoms: She reports that she has persistent numbness and tingling in the second third and fifth fingers of both hands that occurs she uses her hands a lot, particularly at work as a hairstylist.  She normally does not have pain, but reports that when the seasons change from summer to winter and then from winter to summer, thus twice a year, she gets pain at the base of the thumb and up into the volar forearm.    Review of Systems:  Constitutional:  NEGATIVE for fever, chills, change in weight  Integumentary/Skin:  NEGATIVE for worrisome rashes, moles or lesions  Eyes:  NEGATIVE for vision changes or irritation  ENT/Mouth:  NEGATIVE for ear, mouth and throat problems  Resp:  NEGATIVE for significant cough or SOB  Breast:  NEGATIVE for masses, tenderness or discharge  CV:  NEGATIVE for chest pain, palpitations or peripheral edema  GI:  NEGATIVE for nausea, abdominal pain, heartburn, or change in bowel habits  :  Negative   Musculoskeletal:  See HPI above  Neuro:  NEGATIVE for weakness, dizziness or paresthesias  Endocrine:  NEGATIVE for temperature intolerance, skin/hair changes  Heme/allergy/immune:  NEGATIVE for bleeding problems  Psychiatric:  NEGATIVE for changes in mood or affect    Past Medical History:   Past Medical History:   Diagnosis Date     Anemia      ASCUS with positive high risk HPV 11/2012    types 16, 53 & 59     H/O colposcopy with cervical biopsy 1/2013    PAUL 2 & 3     Tobacco use disorder      Past  "Surgical History:   Past Surgical History:   Procedure Laterality Date      SECTION N/A 12/15/2017    Procedure:  SECTION;;  Surgeon: Larissa Dyole MD;  Location: [facility] L+D     LEEP TX, CERVICAL  2013    PAUL 3     SALPINGECTOMY      Done with C/Section 2017     Family History:   Family History   Problem Relation Age of Onset     Hypertension Father      HEART DISEASE Maternal Grandmother      HEART DISEASE Maternal Grandfather      Breast Cancer Paternal Grandmother      Social History:   Social History   Substance Use Topics     Smoking status: Former Smoker     Packs/day: 0.50     Years: 14.00     Types: Cigarettes     Quit date: 2017     Smokeless tobacco: Never Used     Alcohol use No     OBJECTIVE:  Physical Exam:  /80  Pulse 67  Resp 12  Ht 1.727 m (5' 8\")  Wt 127 kg (280 lb)  SpO2 100%  BMI 42.57 kg/m2  General Appearance: healthy, alert and no distress   Skin: no suspicious lesions or rashes  Neuro:  strength: good  Thenar fasciculations: slight  Thenar atrophy: none  Sensation: intact in all digits  Vascular: good pulses, and cappillary refill   Lymph: no lymphadenopathy   Psych:  mentation appears normal and affect normal/bright  Resp: no increased work of breathing    Bilateral Wrist/Hand Exam:  Range of Motion wrist, digits: All Normal  Special tests: Tinel's: negative  Phalen's: negative.    EMG:   From 18 of the bilateral upper extremity showed electrophysiologic evidence of mild bilateral median neuropathy at the wrist.    ASSESSMENT:   Bilateral carpal tunnel syndrome - mild.    PLAN:   We talked about the options, which included activity modification,splinting, corticosteroid injection, medrol dose pack, and CTR.  Carpal tunnel release is not indicated in most cases of mild carpal tunnel syndrome, and certainly not in her situation time.  We decided to proceed with night bracing and a home stretching program as well as anti-inflammatories.  If this " is not successful we could consider a ultrasound-guided carpal tunnel steroid injection in 1 or both wrists..    Return to clinic as needed     YANICK Arias MD  Dept. Orthopedic Surgery  [facility]        Again, thank you for allowing me to participate in the care of your patient.        Sincerely,        Elvis Arias MD

## 2018-09-03 ENCOUNTER — HEALTH MAINTENANCE LETTER (OUTPATIENT)
Age: 28
End: 2018-09-03

## 2018-10-01 ENCOUNTER — HEALTH MAINTENANCE LETTER (OUTPATIENT)
Age: 28
End: 2018-10-01

## 2018-10-15 ENCOUNTER — OFFICE VISIT (OUTPATIENT)
Dept: ALLERGY | Facility: CLINIC | Age: 28
End: 2018-10-15
Payer: COMMERCIAL

## 2018-10-15 DIAGNOSIS — Z23 NEED FOR PROPHYLACTIC VACCINATION AND INOCULATION AGAINST INFLUENZA: Primary | ICD-10-CM

## 2018-10-15 PROCEDURE — 99207 ZZC NO CHARGE NURSE ONLY: CPT | Performed by: ALLERGY & IMMUNOLOGY

## 2018-10-15 PROCEDURE — 90471 IMMUNIZATION ADMIN: CPT | Performed by: ALLERGY & IMMUNOLOGY

## 2018-10-15 PROCEDURE — 90688 IIV4 VACCINE SPLT 0.5 ML IM: CPT | Performed by: ALLERGY & IMMUNOLOGY

## 2018-10-15 NOTE — MR AVS SNAPSHOT
After Visit Summary   10/15/2018    Naheed Cee    MRN: [id number]           Patient Information     Date Of Birth          1990        Visit Information        Provider Department      10/15/2018 11:40 AM Shalom Moore, DO [facility]        Today's Diagnoses     Need for prophylactic vaccination and inoculation against influenza    -  1       Follow-ups after your visit        Who to contact     If you have questions or need follow up information about today's clinic visit or your schedule please contact [facility] directly at [phone].  Normal or non-critical lab and imaging results will be communicated to you by Innovectrahart, letter or phone within 4 business days after the clinic has received the results. If you do not hear from us within 7 days, please contact the clinic through Syncronext or phone. If you have a critical or abnormal lab result, we will notify you by phone as soon as possible.  Submit refill requests through Zing Systems or call your pharmacy and they will forward the refill request to us. Please allow 3 business days for your refill to be completed.          Additional Information About Your Visit        MyChart Information     [employer] Systems gives you secure access to your electronic health record. If you see a primary care provider, you can also send messages to your care team and make appointments. If you have questions, please call your primary care clinic.  If you do not have a primary care provider, please call [phone] and they will assist you.        Care EveryWhere ID     This is your Care EveryWhere ID. This could be used by other organizations to access your [place] medical records  [id number]         Blood Pressure from Last 3 Encounters:   06/13/18 125/80   05/03/18 128/80   01/26/18 139/80    Weight from Last 3 Encounters:   06/13/18 127 kg (280 lb)   05/03/18 127 kg (280 lb)   01/26/18 115.5 kg (254 lb 9.6 oz)              We  Performed the Following     FLU VACCINE, IM (QUADRIVALENT W/PRESERVATIVES/MULTI-DOSE) [62989]- >3 YRS     Vaccine Administration, Initial [37953]        Primary Care Provider Office Phone # Fax #    Bridget Mawuena Agbeh, [phone] [address] [place] [address]        Equal Access to Services     [facility]: Hadii aad ku hadasho Soomaali, waaxda luqadaha, qaybta kaalmada adeegyada, waxay idiin hayaan adeeg kharaarley lajuaquinn . So [facility] [phone].    ATENCIÓN: Si habla esprodo, tiene a ledezma disposición servicios gratuitos de asistencia lingüística. Llame al [phone].    We comply with applicable federal civil rights laws and [place] laws. We do not discriminate on the basis of race, color, national origin, age, disability, sex, sexual orientation, or gender identity.            Thank you!     Thank you for choosing [facility]  for your care. Our goal is always to provide you with excellent care. Hearing back from our patients is one way we can continue to improve our services. Please take a few minutes to complete the written survey that you may receive in the mail after your visit with us. Thank you!             Your Updated Medication List - Protect others around you: Learn how to safely use, store and throw away your medicines at www.disposemymeds.org.      Notice  As of 10/15/2018 12:08 PM    You have not been prescribed any medications.

## 2018-10-15 NOTE — LETTER
10/15/2018         RE: Naheed Cee  [address] [facility] [address]        Dear Colleague,    Thank you for referring your patient, Naheed Cee, to the [facility]. Please see a copy of my visit note below.      Injectable Influenza Immunization Documentation    1.  Is the person to be vaccinated sick today?   No    2. Does the person to be vaccinated have an allergy to a component   of the vaccine?   No  Egg Allergy Algorithm Link    3. Has the person to be vaccinated ever had a serious reaction   to influenza vaccine in the past?   No    4. Has the person to be vaccinated ever had Guillain-Barré syndrome?   No    Form completed by April Gracia [facility] 10/15/2018 12:06 PM             Again, thank you for allowing me to participate in the care of your patient.        Sincerely,        Shalom Moore, DO

## 2018-10-15 NOTE — PROGRESS NOTES

## 2018-12-19 ENCOUNTER — E-VISIT (OUTPATIENT)
Dept: OBGYN | Facility: CLINIC | Age: 28
End: 2018-12-19
Payer: COMMERCIAL

## 2018-12-19 DIAGNOSIS — N39.0 ACUTE UTI (URINARY TRACT INFECTION): Primary | ICD-10-CM

## 2018-12-19 PROCEDURE — 99444 ZZC PHYSICIAN ONLINE EVALUATION & MANAGEMENT SERVICE: CPT | Performed by: NURSE PRACTITIONER

## 2018-12-20 RX ORDER — NITROFURANTOIN 25; 75 MG/1; MG/1
100 CAPSULE ORAL 2 TIMES DAILY
Qty: 10 CAPSULE | Refills: 0 | Status: SHIPPED | OUTPATIENT
Start: 2018-12-20 | End: 2019-12-20

## 2018-12-20 NOTE — PATIENT INSTRUCTIONS
Urinary Tract Infections in Women    Urinary tract infections (UTIs) are most often caused by bacteria. These bacteria enter the urinary tract. The bacteria may come from outside the body. Or they may travel from the skin outside the rectum or vagina into the urethra. Female anatomy makes it easy for bacteria from the bowel to enter a woman s urinary tract, which is the most common source of UTI. This means women develop UTIs more often than men. Pain in or around the urinary tract is a common UTI symptom. But the only way to know for sure if you have a UTI for the healthcare provider to test your urine. The two tests that may be done are the urinalysis and urine culture.  Types of UTIs    Cystitis. A bladder infection (cystitis) is the most common UTI in women. You may have urgent or frequent urination. You may also have pain, burning when you urinate, and bloody urine.    Urethritis. This is an inflamed urethra, which is the tube that carries urine from the bladder to outside the body. You may have lower stomach or back pain. You may also have urgent or frequent urination.    Pyelonephritis. This is a kidney infection. If not treated, it can be serious and damage your kidneys. In severe cases, you may need to stay in the hospital. You may have a fever and lower back pain.  Medicines to treat a UTI  Most UTIs are treated with antibiotics. These kill the bacteria. The length of time you need to take them depends on the type of infection. It may be as short as 3 days. If you have repeated UTIs, you may need a low-dose antibiotic for several months. Take antibiotics exactly as directed. Don t stop taking them until all of the medicine is gone. If you stop taking the antibiotic too soon, the infection may not go away. You may also develop a resistance to the antibiotic. This can make it much harder to treat.  Lifestyle changes to treat and prevent UTIs  The lifestyle changes below will help get rid of your UTI.  They may also help prevent future UTIs.    Drink plenty of fluids. This includes water, juice, or other caffeine-free drinks. Fluids help flush bacteria out of your body.    Empty your bladder. Always empty your bladder when you feel the urge to urinate. And always urinate before going to sleep. Urine that stays in your bladder can lead to infection. Try to urinate before and after sex as well.    Practice good personal hygiene. Wipe yourself from front to back after using the toilet. This helps keep bacteria from getting into the urethra.    Use condoms during sex. These help prevent UTIs caused by sexually transmitted bacteria. Also don't use spermicides during sex. These can increase the risk for UTIs. Choose other forms of birth control instead. For women who tend to get UTIs after sex, a low-dose of a preventive antibiotic may be used. Be sure to discuss this option with your healthcare provider.    Follow up with your healthcare provider as directed. He or she may test to make sure the infection has cleared. If needed, more treatment may be started.  Date Last Reviewed: 1/1/2017 2000-2018 The [employer]. [address] [place] [address]. All rights reserved. This information is not intended as a substitute for professional medical care. Always follow your healthcare professional's instructions.

## 2018-12-31 NOTE — MR AVS SNAPSHOT
"              After Visit Summary   6/13/2018    Naheed Cee    MRN: [id number]           Patient Information     Date Of Birth          1990        Visit Information        Provider Department      6/13/2018 10:30 AM Elvis Arias MD [facility]         Follow-ups after your visit        Who to contact     If you have questions or need follow up information about today's clinic visit or your schedule please contact [facility] directly at [phone].  Normal or non-critical lab and imaging results will be communicated to you by Sotmarkethart, letter or phone within 4 business days after the clinic has received the results. If you do not hear from us within 7 days, please contact the clinic through Sprookit or phone. If you have a critical or abnormal lab result, we will notify you by phone as soon as possible.  Submit refill requests through [email or web address] or call your pharmacy and they will forward the refill request to us. Please allow 3 business days for your refill to be completed.          Additional Information About Your Visit        MyChart Information     [email or web address] gives you secure access to your electronic health record. If you see a primary care provider, you can also send messages to your care team and make appointments. If you have questions, please call your primary care clinic.  If you do not have a primary care provider, please call [phone] and they will assist you.        Care EveryWhere ID     This is your Care EveryWhere ID. This could be used by other organizations to access your [place] medical records  [id number]        Your Vitals Were     Pulse Respirations Height Pulse Oximetry BMI (Body Mass Index)       67 12 1.727 m (5' 8\") 100% 42.57 kg/m2        Blood Pressure from Last 3 Encounters:   06/13/18 125/80   05/03/18 128/80   01/26/18 139/80    Weight from Last 3 Encounters:   06/13/18 127 kg (280 lb)   05/03/18 127 kg (280 lb)   01/26/18 115.5 kg (254 " Lm on vm    lb 9.6 oz)              Today, you had the following     No orders found for display       Primary Care Provider Office Phone # Fax #    Bridget Mawuena Agbeh, [phone] [address] [place] [address]        Equal Access to Services     [facility]: Hadii aad ku hadasho Soomaali, waaxda luqadaha, qaybta kaalmada adeegyada, waxay idiin hayaan adeeg kharash la'aan . So [facility] [phone].    ATENCIÓN: Si habla español, tiene a ledezma disposición servicios gratuitos de asistencia lingüística. Llame al [phone].    We comply with applicable federal civil rights laws and [place] laws. We do not discriminate on the basis of race, color, national origin, age, disability, sex, sexual orientation, or gender identity.            Thank you!     Thank you for choosing [facility]  for your care. Our goal is always to provide you with excellent care. Hearing back from our patients is one way we can continue to improve our services. Please take a few minutes to complete the written survey that you may receive in the mail after your visit with us. Thank you!             Your Updated Medication List - Protect others around you: Learn how to safely use, store and throw away your medicines at www.disposemymeds.org.      Notice  As of 6/13/2018 10:59 AM    You have not been prescribed any medications.

## 2019-02-18 ENCOUNTER — TELEPHONE (OUTPATIENT)
Dept: OBGYN | Facility: CLINIC | Age: 29
End: 2019-02-18

## 2019-02-18 NOTE — TELEPHONE ENCOUNTER
Pt is past due for f/u pap smear.  [facility] clinic and schedule.  Rashmi Crawford,    Pap Tracking

## 2019-03-13 ENCOUNTER — E-VISIT (OUTPATIENT)
Dept: FAMILY MEDICINE | Facility: CLINIC | Age: 29
End: 2019-03-13
Payer: COMMERCIAL

## 2019-03-13 DIAGNOSIS — J32.9 SINUSITIS, UNSPECIFIED CHRONICITY, UNSPECIFIED LOCATION: Primary | ICD-10-CM

## 2019-03-13 PROCEDURE — 99444 ZZC PHYSICIAN ONLINE EVALUATION & MANAGEMENT SERVICE: CPT | Performed by: FAMILY MEDICINE

## 2019-03-14 RX ORDER — AZITHROMYCIN 250 MG/1
TABLET, FILM COATED ORAL
Qty: 6 TABLET | Refills: 0 | Status: SHIPPED | OUTPATIENT
Start: 2019-03-14 | End: 2021-06-16

## 2020-02-23 ENCOUNTER — HEALTH MAINTENANCE LETTER (OUTPATIENT)
Age: 30
End: 2020-02-23

## 2020-05-21 NOTE — PLAN OF CARE
"Problem: Patient Care Overview  Goal: Plan of Care/Patient Progress Review  Outcome: No Change  Patient would like NICU tour, but is apprehensive about seeing the \"small babies\". NICU notified. Continue present cares.       " Carmita Al was admitted to ICU from PACU via cart accompanied by RN.   Reason for hospitalization is SBO with bowel resection.   Upon arrival, patient is stable. Patient has history significant for HTN, HLD.  Patient oriented to bed, call light, room and unit.  Patient provided with the following educational materials upon admission:safety, advanced directives, infection control and pain.   Level of understanding patient verbalized understanding.   Admission orders received at this time.   MD notified of patient arrival.   See Epic documentation for patient individualized nursing care plan.

## 2020-10-31 ENCOUNTER — IMMUNIZATION (OUTPATIENT)
Dept: NURSING | Facility: CLINIC | Age: 30
End: 2020-10-31
Payer: COMMERCIAL

## 2020-10-31 DIAGNOSIS — Z23 NEED FOR PROPHYLACTIC VACCINATION AND INOCULATION AGAINST INFLUENZA: Primary | ICD-10-CM

## 2020-10-31 PROCEDURE — 90471 IMMUNIZATION ADMIN: CPT

## 2020-10-31 PROCEDURE — 90686 IIV4 VACC NO PRSV 0.5 ML IM: CPT

## 2020-10-31 PROCEDURE — 99207 PR NO CHARGE NURSE ONLY: CPT

## 2021-03-25 NOTE — PROGRESS NOTES
"RN informed MD that patient is c/o \"painful bump' in her genital area.  She was evaluated at bedside. She states she felt the painful swelling today, never had it before, denies fevers/chills/N/V  /70  Pulse 82  Temp 97.8  F (36.6  C) (Oral)  Resp 16  Ht 1.727 m (5' 8\")  Wt 115.9 kg (255 lb 9.6 oz)  LMP 05/13/2017  SpO2 100%  BMI 38.86 kg/m2  PE: 5x5 cm tender swelling in the perineal area, red, warm, with small 2x2 cm fluctuant area.    She was counseled about I&D which she agreed to have    I&D done after cleaning the area with Betadine, injecting Lidocaine pus was evacuated and then was flushed with saline.   Pt felt much better afterwards    Plan:  Sitz bath x2 daily for 3 days  Genital Hygiene   Clindamycin x7 days    D/w      " Please obtain BG update for Dr Klein's review for pt at 25w6d

## 2021-04-17 ENCOUNTER — HEALTH MAINTENANCE LETTER (OUTPATIENT)
Age: 31
End: 2021-04-17

## 2021-06-16 ENCOUNTER — OFFICE VISIT (OUTPATIENT)
Dept: OBGYN | Facility: CLINIC | Age: 31
End: 2021-06-16
Payer: COMMERCIAL

## 2021-06-16 VITALS
OXYGEN SATURATION: 98 % | HEART RATE: 81 BPM | DIASTOLIC BLOOD PRESSURE: 79 MMHG | TEMPERATURE: 97.8 F | SYSTOLIC BLOOD PRESSURE: 131 MMHG | WEIGHT: 280.2 LBS | BODY MASS INDEX: 42.47 KG/M2 | HEIGHT: 68 IN

## 2021-06-16 DIAGNOSIS — Z01.419 ENCOUNTER FOR GYNECOLOGICAL EXAMINATION WITHOUT ABNORMAL FINDING: Primary | ICD-10-CM

## 2021-06-16 DIAGNOSIS — L65.9 HAIR LOSS: ICD-10-CM

## 2021-06-16 DIAGNOSIS — D50.8 OTHER IRON DEFICIENCY ANEMIA: ICD-10-CM

## 2021-06-16 DIAGNOSIS — Z13.6 CARDIOVASCULAR SCREENING; LDL GOAL LESS THAN 130: ICD-10-CM

## 2021-06-16 DIAGNOSIS — Z13.1 SCREENING FOR DIABETES MELLITUS: ICD-10-CM

## 2021-06-16 DIAGNOSIS — Z11.59 NEED FOR HEPATITIS C SCREENING TEST: ICD-10-CM

## 2021-06-16 LAB
ERYTHROCYTE [DISTWIDTH] IN BLOOD BY AUTOMATED COUNT: 15.7 % (ref 10–15)
HCT VFR BLD AUTO: 34.8 % (ref 35–47)
HCV AB SERPL QL IA: NONREACTIVE
HGB BLD-MCNC: 10.4 G/DL (ref 11.7–15.7)
MCH RBC QN AUTO: 23.6 PG (ref 26.5–33)
MCHC RBC AUTO-ENTMCNC: 29.9 G/DL (ref 31.5–36.5)
MCV RBC AUTO: 79 FL (ref 78–100)
PLATELET # BLD AUTO: 380 10E9/L (ref 150–450)
RBC # BLD AUTO: 4.4 10E12/L (ref 3.8–5.2)
TSH SERPL DL<=0.005 MIU/L-ACNC: 2.02 MU/L (ref 0.4–4)
WBC # BLD AUTO: 5.8 10E9/L (ref 4–11)

## 2021-06-16 PROCEDURE — G0145 SCR C/V CYTO,THINLAYER,RESCR: HCPCS | Performed by: NURSE PRACTITIONER

## 2021-06-16 PROCEDURE — 99385 PREV VISIT NEW AGE 18-39: CPT | Performed by: NURSE PRACTITIONER

## 2021-06-16 PROCEDURE — 84443 ASSAY THYROID STIM HORMONE: CPT | Performed by: NURSE PRACTITIONER

## 2021-06-16 PROCEDURE — 86803 HEPATITIS C AB TEST: CPT | Performed by: NURSE PRACTITIONER

## 2021-06-16 PROCEDURE — 87624 HPV HI-RISK TYP POOLED RSLT: CPT | Performed by: NURSE PRACTITIONER

## 2021-06-16 PROCEDURE — 36415 COLL VENOUS BLD VENIPUNCTURE: CPT | Performed by: NURSE PRACTITIONER

## 2021-06-16 PROCEDURE — 85027 COMPLETE CBC AUTOMATED: CPT | Performed by: NURSE PRACTITIONER

## 2021-06-16 ASSESSMENT — PAIN SCALES - GENERAL: PAINLEVEL: NO PAIN (0)

## 2021-06-16 ASSESSMENT — MIFFLIN-ST. JEOR: SCORE: 2024.48

## 2021-06-16 NOTE — PROGRESS NOTES
SUBJECTIVE:   CC: Naheed Cee is an 31 year old woman who presents for preventive health visit.     {Healthy Habits:     Getting at least 3 servings of Calcium per day:  NO    Bi-annual eye exam:  Yes    Dental care twice a year:  Yes    Sleep apnea or symptoms of sleep apnea:  None    Diet:  Regular (no restrictions)    Frequency of exercise:  None    Taking medications regularly:  Yes    Medication side effects:  Not applicable    PHQ-2 Total Score: 0    Additional concerns today:  No    Has been having some continued hair loss after her last delivery over 3 years ago. Would like some labs today.      Today's PHQ-2 Score:   PHQ-2 (  [employer]) 2021   Q1: Little interest or pleasure in doing things 0   Q2: Feeling down, depressed or hopeless 0   PHQ-2 Score 0   Q1: Little interest or pleasure in doing things Not at all   Q2: Feeling down, depressed or hopeless Not at all   PHQ-2 Score 0       Abuse: Current or Past (Physical, Sexual or Emotional) - No  Do you feel safe in your environment? Yes        Social History     Tobacco Use     Smoking status: Former Smoker     Packs/day: 0.50     Years: 14.00     Pack years: 7.00     Types: Cigarettes     Quit date: 2017     Years since quitting: 3.5     Smokeless tobacco: Never Used   Substance Use Topics     Alcohol use: No         Alcohol Use 2021   Prescreen: >3 drinks/day or >7 drinks/week? No   No flowsheet data found.    Reviewed orders with patient.  Reviewed health maintenance and updated orders accordingly - Yes  Patient Active Problem List   Diagnosis     Severe dysplasia of cervix (PAUL III)     Tobacco use disorder     Severe dysplasia of cervix     Sterilization consult      premature rupture of membranes (PPROM) delivered, current hospitalization     S/P  section     Bilateral carpal tunnel syndrome     Past Surgical History:   Procedure Laterality Date      SECTION N/A 12/15/2017    Procedure:   SECTION;;  Surgeon: Larissa Doyle MD;  [facility]     LEEP TX, CERVICAL  2013    PAUL 3     SALPINGECTOMY      Done with C/Section 2017       Social History     Tobacco Use     Smoking status: Former Smoker     Packs/day: 0.50     Years: 14.00     Pack years: 7.00     Types: Cigarettes     Quit date: 2017     Years since quitting: 3.5     Smokeless tobacco: Never Used   Substance Use Topics     Alcohol use: No     Family History   Problem Relation Age of Onset     Hypertension Father      Heart Disease Maternal Grandmother      Heart Disease Maternal Grandfather      Breast Cancer Paternal Grandmother            Breast Cancer Screening:  Any new diagnosis of family breast, ovarian, or bowel cancer? No    Patient under 40 years of age: Routine Mammogram Screening not recommended.   Pertinent mammograms are reviewed under the imaging tab.    History of abnormal Pap smear: YES - updated in Problem List and Health Maintenance accordingly  PAP / HPV Latest Ref Rng & Units 2017   PAP - NIL NIL ASC-US(A)   HPV 16 DNA NEG Negative - -   HPV 18 DNA NEG Negative - -   OTHER HR HPV NEG Negative - -     Reviewed and updated as needed this visit by clinical staff  Tobacco  Allergies  Meds   Med Hx  Surg Hx  Fam Hx  Soc Hx        Reviewed and updated as needed this visit by Provider                Past Medical History:   Diagnosis Date     Anemia      ASCUS with positive high risk HPV 2012    types 16, 53 & 59     H/O colposcopy with cervical biopsy 2013    PAUL 2 & 3     Tobacco use disorder       Past Surgical History:   Procedure Laterality Date      SECTION N/A 12/15/2017    Procedure:  SECTION;;  Surgeon: Larissa Doyle MD;  [facility]     LEEP TX, CERVICAL  2013    PAUL 3     SALPINGECTOMY      Done with C/Section 2017       Review of Systems  CONSTITUTIONAL: NEGATIVE for fever, chills, change in weight  INTEGUMENTARU/SKIN: NEGATIVE for  "worrisome rashes, moles or lesions  EYES: NEGATIVE for vision changes or irritation  ENT: NEGATIVE for ear, mouth and throat problems  RESP: NEGATIVE for significant cough or SOB  BREAST: NEGATIVE for masses, tenderness or discharge  CV: NEGATIVE for chest pain, palpitations or peripheral edema  GI: NEGATIVE for nausea, abdominal pain, heartburn, or change in bowel habits  : NEGATIVE for unusual urinary or vaginal symptoms. Periods are regular.  MUSCULOSKELETAL: NEGATIVE for significant arthralgias or myalgia  NEURO: NEGATIVE for weakness, dizziness or paresthesias  PSYCHIATRIC: NEGATIVE for changes in mood or affect     OBJECTIVE:   /79 (BP Location: Right arm, Patient Position: Sitting, Cuff Size: Adult Large)   Pulse 81   Temp 97.8  F (36.6  C) (Tympanic)   Ht 1.727 m (5' 8\")   Wt 127.1 kg (280 lb 3.2 oz)   LMP 05/24/2021 (Approximate)   SpO2 98%   BMI 42.60 kg/m    Physical Exam  GENERAL: healthy, alert and no distress  EYES: Eyes grossly normal to inspection, PERRL and conjunctivae and sclerae normal  HENT: ear canals and TM's normal  NECK: no adenopathy, no asymmetry, masses, or scars and thyroid normal to palpation  RESP: lungs clear to auscultation - no rales, rhonchi or wheezes  BREAST: normal without masses, tenderness or nipple discharge and no palpable axillary masses or adenopathy  CV: regular rate and rhythm, normal S1 S2, no S3 or S4, no murmur, click or rub, no peripheral edema and peripheral pulses strong  ABDOMEN: soft, nontender, no hepatosplenomegaly, no masses and bowel sounds normal   (female): normal female external genitalia, normal urethral meatus, vaginal mucosa pink, moist, well rugated, and normal cervix/adnexa/uterus without masses or discharge  MS: no gross musculoskeletal defects noted, no edema  SKIN: no suspicious lesions or rashes  NEURO: Normal strength and tone, mentation intact and speech normal  PSYCH: mentation appears normal, affect " "normal/bright    ASSESSMENT/PLAN:   1. Encounter for gynecological examination without abnormal finding  - Pap imaged thin layer screen with HPV - recommended age 30 - 65 years (select HPV order below)  - HPV High Risk Types DNA Cervical    2. Need for hepatitis C screening test  - Hepatitis C Screen Reflex to HCV RNA Quant and Genotype    3. Hair loss  Will check labs. Discussed recommendations for follow up if labs normal.  - TSH with free T4 reflex  - CBC with platelets    4. CARDIOVASCULAR SCREENING; LDL GOAL LESS THAN 130  - Lipid panel reflex to direct LDL Fasting; Future    5. Screening for diabetes mellitus  - Glucose; Future    COUNSELING:  Reviewed preventive health counseling, as reflected in patient instructions  Special attention given to:        Regular exercise       Healthy diet/nutrition    Estimated body mass index is 42.6 kg/m  as calculated from the following:    Height as of this encounter: 1.727 m (5' 8\").    Weight as of this encounter: 127.1 kg (280 lb 3.2 oz).    Weight management plan: Discussed healthy diet and exercise guidelines    She reports that she quit smoking about 3 years ago. Her smoking use included cigarettes. She has a 7.00 pack-year smoking history. She has never used smokeless tobacco.      Counseling Resources:  ATP IV Guidelines  Pooled Cohorts Equation Calculator  Breast Cancer Risk Calculator  BRCA-Related Cancer Risk Assessment: FHS-7 Tool  FRAX Risk Assessment  ICSI Preventive Guidelines  Dietary Guidelines for Americans, 2010  USDA's MyPlate  ASA Prophylaxis  Lung CA Screening    NARAYAN Rebolledo CNP  [facility]  "

## 2021-06-18 LAB
COPATH REPORT: NORMAL
PAP: NORMAL

## 2021-06-22 ENCOUNTER — PATIENT OUTREACH (OUTPATIENT)
Dept: OBGYN | Facility: CLINIC | Age: 31
End: 2021-06-22

## 2021-06-22 LAB
FINAL DIAGNOSIS: NORMAL
HPV HR 12 DNA CVX QL NAA+PROBE: NEGATIVE
HPV16 DNA SPEC QL NAA+PROBE: NEGATIVE
HPV18 DNA SPEC QL NAA+PROBE: NEGATIVE
SPECIMEN DESCRIPTION: NORMAL
SPECIMEN SOURCE CVX/VAG CYTO: NORMAL

## 2021-06-22 NOTE — RESULT ENCOUNTER NOTE
Normal pap/Neg HPV letter sent through [employer] results. Next pap smear and HPV due in 1 year.     Cher Bryant, RN, BSN  Pap Tracking Nurse

## 2021-06-26 DIAGNOSIS — Z13.1 SCREENING FOR DIABETES MELLITUS: ICD-10-CM

## 2021-06-26 DIAGNOSIS — Z13.6 CARDIOVASCULAR SCREENING; LDL GOAL LESS THAN 130: ICD-10-CM

## 2021-06-26 PROCEDURE — 80061 LIPID PANEL: CPT | Performed by: NURSE PRACTITIONER

## 2021-06-26 PROCEDURE — 36415 COLL VENOUS BLD VENIPUNCTURE: CPT | Performed by: NURSE PRACTITIONER

## 2021-06-26 PROCEDURE — 82947 ASSAY GLUCOSE BLOOD QUANT: CPT | Performed by: NURSE PRACTITIONER

## 2021-06-28 LAB
CHOLEST SERPL-MCNC: 208 MG/DL
GLUCOSE SERPL-MCNC: 92 MG/DL (ref 70–99)
HDLC SERPL-MCNC: 53 MG/DL
LDLC SERPL CALC-MCNC: 141 MG/DL
NONHDLC SERPL-MCNC: 155 MG/DL
TRIGL SERPL-MCNC: 68 MG/DL

## 2021-09-26 ENCOUNTER — HEALTH MAINTENANCE LETTER (OUTPATIENT)
Age: 31
End: 2021-09-26

## 2021-11-15 ENCOUNTER — E-VISIT (OUTPATIENT)
Dept: FAMILY MEDICINE | Facility: CLINIC | Age: 31
End: 2021-11-15
Payer: COMMERCIAL

## 2021-11-15 DIAGNOSIS — L65.9 HAIR LOSS: Primary | ICD-10-CM

## 2021-11-15 DIAGNOSIS — U07.1 INFECTION DUE TO 2019 NOVEL CORONAVIRUS: ICD-10-CM

## 2021-11-15 PROCEDURE — 99422 OL DIG E/M SVC 11-20 MIN: CPT | Performed by: PHYSICIAN ASSISTANT

## 2021-11-18 ENCOUNTER — MYC MEDICAL ADVICE (OUTPATIENT)
Dept: FAMILY MEDICINE | Facility: CLINIC | Age: 31
End: 2021-11-18
Payer: COMMERCIAL

## 2021-11-18 DIAGNOSIS — L65.9 HAIR LOSS: Primary | ICD-10-CM

## 2022-03-21 ENCOUNTER — E-VISIT (OUTPATIENT)
Dept: URGENT CARE | Facility: URGENT CARE | Age: 32
End: 2022-03-21
Payer: COMMERCIAL

## 2022-03-21 DIAGNOSIS — R06.02 SOB (SHORTNESS OF BREATH): Primary | ICD-10-CM

## 2022-03-21 PROCEDURE — 99421 OL DIG E/M SVC 5-10 MIN: CPT | Performed by: FAMILY MEDICINE

## 2022-03-21 NOTE — PATIENT INSTRUCTIONS
Dear Naheed Cee,    We are sorry you are not feeling well. Based on the responses you provided, it is recommended that you be seen in-person in urgent care so we can better evaluate your symptoms. Please click here to find the nearest urgent care location to you.   Thank you for trusting us with your care.    Joe Dinh, DO

## 2022-05-26 ENCOUNTER — PATIENT OUTREACH (OUTPATIENT)
Dept: OBGYN | Facility: CLINIC | Age: 32
End: 2022-05-26
Payer: COMMERCIAL

## 2022-07-06 ASSESSMENT — ENCOUNTER SYMPTOMS
CONSTIPATION: 0
SORE THROAT: 0
PALPITATIONS: 0
HEARTBURN: 0
ABDOMINAL PAIN: 0
MYALGIAS: 0
CHILLS: 0
NAUSEA: 0
ARTHRALGIAS: 0
EYE PAIN: 0
SHORTNESS OF BREATH: 0
FEVER: 0
NERVOUS/ANXIOUS: 0
PARESTHESIAS: 0
DIZZINESS: 0
HEMATURIA: 0
COUGH: 0
JOINT SWELLING: 0
HEMATOCHEZIA: 0
BREAST MASS: 0
DYSURIA: 0
HEADACHES: 0
FREQUENCY: 0
WEAKNESS: 0
DIARRHEA: 0

## 2022-07-07 ENCOUNTER — OFFICE VISIT (OUTPATIENT)
Dept: OBGYN | Facility: CLINIC | Age: 32
End: 2022-07-07
Payer: COMMERCIAL

## 2022-07-07 VITALS
SYSTOLIC BLOOD PRESSURE: 119 MMHG | HEART RATE: 77 BPM | OXYGEN SATURATION: 96 % | BODY MASS INDEX: 43.89 KG/M2 | WEIGHT: 289.6 LBS | DIASTOLIC BLOOD PRESSURE: 80 MMHG | HEIGHT: 68 IN

## 2022-07-07 DIAGNOSIS — Z13.6 CARDIOVASCULAR SCREENING; LDL GOAL LESS THAN 130: ICD-10-CM

## 2022-07-07 DIAGNOSIS — Z13.1 SCREENING FOR DIABETES MELLITUS: ICD-10-CM

## 2022-07-07 DIAGNOSIS — Z01.419 ENCOUNTER FOR GYNECOLOGICAL EXAMINATION WITHOUT ABNORMAL FINDING: Primary | ICD-10-CM

## 2022-07-07 DIAGNOSIS — N92.0 EXCESSIVE OR FREQUENT MENSTRUATION: ICD-10-CM

## 2022-07-07 LAB
HGB BLD-MCNC: 14 G/DL (ref 11.7–15.7)
TSH SERPL DL<=0.005 MIU/L-ACNC: 2.05 MU/L (ref 0.4–4)

## 2022-07-07 PROCEDURE — 87624 HPV HI-RISK TYP POOLED RSLT: CPT | Performed by: NURSE PRACTITIONER

## 2022-07-07 PROCEDURE — 99395 PREV VISIT EST AGE 18-39: CPT | Performed by: NURSE PRACTITIONER

## 2022-07-07 PROCEDURE — 36415 COLL VENOUS BLD VENIPUNCTURE: CPT | Performed by: NURSE PRACTITIONER

## 2022-07-07 PROCEDURE — 84443 ASSAY THYROID STIM HORMONE: CPT | Performed by: NURSE PRACTITIONER

## 2022-07-07 PROCEDURE — 85018 HEMOGLOBIN: CPT | Performed by: NURSE PRACTITIONER

## 2022-07-07 PROCEDURE — G0145 SCR C/V CYTO,THINLAYER,RESCR: HCPCS | Performed by: NURSE PRACTITIONER

## 2022-07-07 PROCEDURE — 99213 OFFICE O/P EST LOW 20 MIN: CPT | Mod: 25 | Performed by: NURSE PRACTITIONER

## 2022-07-07 ASSESSMENT — PAIN SCALES - GENERAL: PAINLEVEL: NO PAIN (0)

## 2022-07-07 NOTE — PROGRESS NOTES
SUBJECTIVE:   CC: Naheed Cee is an 32 year old woman who presents for preventive health visit.     {Patient has been advised of split billing requirements and indicates understanding: Yes  Healthy Habits:     Getting at least 3 servings of Calcium per day:  NO    Bi-annual eye exam:  Yes    Dental care twice a year:  NO    Sleep apnea or symptoms of sleep apnea:  None    Diet:  Regular (no restrictions)    Frequency of exercise:  None    Taking medications regularly:  Yes    Medication side effects:  Not applicable and None    PHQ-2 Total Score: 0    Additional concerns today:  Yes    In addition to preventive care, patient has menstrual cycle concerns. Last 2 cycles have been irregular. Last was 14 days late. Previous one was on time, but lasted 15 days.  For the last 1+ years, has been having worsening pain with her cycles and now also occurring between cycles. Pain is much worse during the cycle. Flow is very heavy-often sleeps with 2 pads on. Does feel fatigued during this time. Has intermittent LLQ discomfort. Occasional dizziness and nausea.   Has had salpingectomy.     Today's PHQ-2 Score:   PHQ-2 (  [employer]) 2022   Q1: Little interest or pleasure in doing things 0   Q2: Feeling down, depressed or hopeless 0   PHQ-2 Score 0   PHQ-2 Total Score (12-17 Years)- Positive if 3 or more points; Administer PHQ-A if positive -   Q1: Little interest or pleasure in doing things Not at all   Q2: Feeling down, depressed or hopeless Not at all   PHQ-2 Score 0       Abuse: Current or Past (Physical, Sexual or Emotional) - No  Do you feel safe in your environment? Yes        Social History     Tobacco Use     Smoking status: Former Smoker     Packs/day: 0.50     Years: 14.00     Pack years: 7.00     Types: Cigarettes     Quit date: 2017     Years since quittin.6     Smokeless tobacco: Never Used   Substance Use Topics     Alcohol use: No         Alcohol Use 2022   Prescreen: >3 drinks/day or >7  drinks/week? No   Prescreen: >3 drinks/day or >7 drinks/week? -   No flowsheet data found.    Reviewed orders with patient.  Reviewed health maintenance and updated orders accordingly - Yes  Patient Active Problem List   Diagnosis     Severe dysplasia of cervix (PAUL III)     Tobacco use disorder     Severe dysplasia of cervix     Sterilization consult      premature rupture of membranes (PPROM) delivered, current hospitalization     S/P  section     Bilateral carpal tunnel syndrome     Past Surgical History:   Procedure Laterality Date      SECTION N/A 12/15/2017    Procedure:  SECTION;;  Surgeon: Larissa Doyle MD;  Location:  L+D     LEEP TX, CERVICAL  2013    PAUL 3     SALPINGECTOMY      Done with C/Section 2017       Social History     Tobacco Use     Smoking status: Former Smoker     Packs/day: 0.50     Years: 14.00     Pack years: 7.00     Types: Cigarettes     Quit date: 2017     Years since quittin.6     Smokeless tobacco: Never Used   Substance Use Topics     Alcohol use: No     Family History   Problem Relation Age of Onset     Hypertension Father      Heart Disease Maternal Grandmother      Heart Disease Maternal Grandfather      Breast Cancer Paternal Grandmother            Breast Cancer Screening:    Patient under 40 years of age: Routine Mammogram Screening not recommended.   Pertinent mammograms are reviewed under the imaging tab.    History of abnormal Pap smear: YES - PAUL 2/3 on biopsy - PAP/HPV co-testing at 12, 24 months.  If two negative results repeat co-testing in 3 years, if negative then routine screening.  PAP / HPV Latest Ref Rng & Units 2021   PAP (Historical) - NIL NIL NIL   HPV16 NEG:Negative Negative Negative -   HPV18 NEG:Negative Negative Negative -   HRHPV NEG:Negative Negative Negative -     Reviewed and updated as needed this visit by clinical staff   Tobacco  Allergies  Meds   Med Hx  Surg Hx  Fam Hx  " Soc Hx          Reviewed and updated as needed this visit by Provider                   Past Medical History:   Diagnosis Date     Anemia      ASCUS with positive high risk HPV 2012    types 16, 53 & 59     H/O colposcopy with cervical biopsy 2013    PAUL 2 & 3     Tobacco use disorder       Past Surgical History:   Procedure Laterality Date      SECTION N/A 12/15/2017    Procedure:  SECTION;;  Surgeon: Larissa Doyle MD;  [facility]     LEEP TX, CERVICAL  2013    PAUL 3     SALPINGECTOMY      Done with C/Section 2017       Review of Systems  CONSTITUTIONAL: NEGATIVE for fever, chills, change in weight  INTEGUMENTARU/SKIN: NEGATIVE for worrisome rashes, moles or lesions  EYES: NEGATIVE for vision changes or irritation  ENT: NEGATIVE for ear, mouth and throat problems  RESP: NEGATIVE for significant cough or SOB  BREAST: NEGATIVE for masses, tenderness or discharge  CV: NEGATIVE for chest pain, palpitations or peripheral edema  GI: NEGATIVE for nausea, abdominal pain, heartburn, or change in bowel habits  : NEGATIVE for unusual urinary or vaginal symptoms. Periods: see above  MUSCULOSKELETAL: NEGATIVE for significant arthralgias or myalgia  NEURO: NEGATIVE for weakness, dizziness or paresthesias  PSYCHIATRIC: NEGATIVE for changes in mood or affect     OBJECTIVE:   /80 (BP Location: Right arm, Patient Position: Sitting, Cuff Size: Adult Large)   Pulse 77   Ht 1.727 m (5' 8\")   Wt 131.4 kg (289 lb 9.6 oz)   LMP 2022 (Approximate)   SpO2 96%   BMI 44.03 kg/m    Physical Exam  GENERAL: healthy, alert and no distress  NECK: no adenopathy, no asymmetry, masses, or scars and thyroid normal to palpation  RESP: lungs clear to auscultation - no rales, rhonchi or wheezes  BREAST: normal without masses, tenderness or nipple discharge and no palpable axillary masses or adenopathy  CV: regular rate and rhythm, normal S1 S2, no S3 or S4, no murmur, click or rub, no peripheral " "edema and peripheral pulses strong  ABDOMEN: soft, nontender, no hepatosplenomegaly, no masses and bowel sounds normal   (female): normal female external genitalia, normal urethral meatus, vaginal mucosa pink, moist, well rugated, and normal cervix/adnexa/uterus without masses or discharge  MS: no gross musculoskeletal defects noted, no edema  SKIN: no suspicious lesions or rashes  NEURO: Normal strength and tone, mentation intact and speech normal  PSYCH: mentation appears normal, affect normal/bright    ASSESSMENT/PLAN:   (Z01.419) Encounter for gynecological examination without abnormal finding  (primary encounter diagnosis)  Plan: Pap imaged thin layer screen with HPV -         recommended age 30 - 65          (Z13.1) Screening for diabetes mellitus  Plan: Glucose         (Z13.6) CARDIOVASCULAR SCREENING; LDL GOAL LESS THAN 130  Plan: Lipid panel reflex to direct LDL Fasting       (N92.0) Excessive or frequent menstruation  Comment: We discussed her menstrual cycle changes and concerns. Plan labs and imaging per below and reviewed rationale. Plan follow up based on results. We did review management options such as surgical intervention vs hormonal medication. If we went with hormonal medication, consider progesterone to try to suppress cycles and pain between cycles. She will consider the options.  Plan: TSH with free T4 reflex, Hemoglobin, US Pelvic         Transabdominal and Transvaginal         Patient has been advised of split billing requirements and indicates understanding: Yes    COUNSELING:  Reviewed preventive health counseling, as reflected in patient instructions  Special attention given to:        Regular exercise       Healthy diet/nutrition    Estimated body mass index is 44.03 kg/m  as calculated from the following:    Height as of this encounter: 1.727 m (5' 8\").    Weight as of this encounter: 131.4 kg (289 lb 9.6 oz).    Weight management plan: Discussed healthy diet and exercise " guidelines    She reports that she quit smoking about 4 years ago. Her smoking use included cigarettes. She has a 7.00 pack-year smoking history. She has never used smokeless tobacco.      Counseling Resources:  ATP IV Guidelines  Pooled Cohorts Equation Calculator  Breast Cancer Risk Calculator  BRCA-Related Cancer Risk Assessment: FHS-7 Tool  FRAX Risk Assessment  ICSI Preventive Guidelines  Dietary Guidelines for Americans, 2010  USDA's MyPlate  ASA Prophylaxis  Lung CA Screening    NARAYAN Rebolledo CNP  [facility]

## 2022-07-07 NOTE — PATIENT INSTRUCTIONS
If you have any questions regarding your visit, Please contact your care team.     [facility]: [phone]  To Schedule an Appointment 24/7  Call: [phone] HOURS TELEPHONE NUMBER     Rain Yip- APRN CNP      Roldan Magana-RONEN Caro-RN  Beatriz Cabral-Surgery Scheduler  Rosaura-Surgery Scheduler         Monday 7:30 am-5:00 pm    Tuesday 8:00 am-4:00 pm    Wednesday 7:30 am-4:00 pm  [place]    Thursday 8:00 am-11:00 am    Friday 7:30 am-4:00 pm [address] [place] [address] [phone] ask for [facility]  [phone] Fax    Imaging Scheduling all locations  [phone]     [facility]  [address] [place] [address]   [place], [place] [id number] [phone]         Urgent Care locations:  [facility]   Monday-Friday  10 am - 8 pm  Saturday and Sunday   9 am - 5 pm     [phone]   If you need a medication refill, please contact your pharmacy. Please allow 3 business days for your refill to be completed.  As always, Thank you for trusting us with your healthcare needs!      see additional instructions from your care team below

## 2022-07-11 LAB
BKR LAB AP GYN ADEQUACY: NORMAL
BKR LAB AP GYN INTERPRETATION: NORMAL
BKR LAB AP HPV REFLEX: NORMAL
BKR LAB AP LMP: NORMAL
BKR LAB AP PREVIOUS ABNORMAL: NORMAL
PATH REPORT.COMMENTS IMP SPEC: NORMAL
PATH REPORT.COMMENTS IMP SPEC: NORMAL
PATH REPORT.RELEVANT HX SPEC: NORMAL

## 2022-07-13 LAB
HUMAN PAPILLOMA VIRUS 16 DNA: NEGATIVE
HUMAN PAPILLOMA VIRUS 18 DNA: NEGATIVE
HUMAN PAPILLOMA VIRUS FINAL DIAGNOSIS: NORMAL
HUMAN PAPILLOMA VIRUS OTHER HR: NEGATIVE

## 2022-07-14 ENCOUNTER — ANCILLARY PROCEDURE (OUTPATIENT)
Dept: ULTRASOUND IMAGING | Facility: CLINIC | Age: 32
End: 2022-07-14
Attending: NURSE PRACTITIONER
Payer: COMMERCIAL

## 2022-07-14 DIAGNOSIS — N92.0 EXCESSIVE OR FREQUENT MENSTRUATION: ICD-10-CM

## 2022-07-14 PROCEDURE — 76830 TRANSVAGINAL US NON-OB: CPT | Mod: TC | Performed by: RADIOLOGY

## 2022-07-14 PROCEDURE — 76856 US EXAM PELVIC COMPLETE: CPT | Mod: TC | Performed by: RADIOLOGY

## 2022-07-24 ENCOUNTER — LAB (OUTPATIENT)
Dept: LAB | Facility: CLINIC | Age: 32
End: 2022-07-24
Payer: COMMERCIAL

## 2022-07-24 DIAGNOSIS — Z13.6 CARDIOVASCULAR SCREENING; LDL GOAL LESS THAN 130: ICD-10-CM

## 2022-07-24 DIAGNOSIS — Z13.1 SCREENING FOR DIABETES MELLITUS: ICD-10-CM

## 2022-07-24 PROCEDURE — 80061 LIPID PANEL: CPT

## 2022-07-24 PROCEDURE — 82947 ASSAY GLUCOSE BLOOD QUANT: CPT

## 2022-07-24 PROCEDURE — 36415 COLL VENOUS BLD VENIPUNCTURE: CPT

## 2022-07-25 LAB
CHOLEST SERPL-MCNC: 198 MG/DL
FASTING STATUS PATIENT QL REPORTED: YES
FASTING STATUS PATIENT QL REPORTED: YES
GLUCOSE BLD-MCNC: 98 MG/DL (ref 70–99)
HDLC SERPL-MCNC: 50 MG/DL
LDLC SERPL CALC-MCNC: 128 MG/DL
NONHDLC SERPL-MCNC: 148 MG/DL
TRIGL SERPL-MCNC: 102 MG/DL

## 2022-08-15 ENCOUNTER — MYC MEDICAL ADVICE (OUTPATIENT)
Dept: OBGYN | Facility: CLINIC | Age: 32
End: 2022-08-15

## 2022-08-15 ENCOUNTER — OFFICE VISIT (OUTPATIENT)
Dept: OBGYN | Facility: CLINIC | Age: 32
End: 2022-08-15
Payer: COMMERCIAL

## 2022-08-15 VITALS
BODY MASS INDEX: 44.03 KG/M2 | SYSTOLIC BLOOD PRESSURE: 124 MMHG | DIASTOLIC BLOOD PRESSURE: 81 MMHG | WEIGHT: 289.6 LBS | OXYGEN SATURATION: 97 % | HEART RATE: 74 BPM

## 2022-08-15 DIAGNOSIS — N93.9 ABNORMAL UTERINE BLEEDING DUE TO ENDOMETRIAL POLYP: ICD-10-CM

## 2022-08-15 DIAGNOSIS — N84.0 ABNORMAL UTERINE BLEEDING DUE TO ENDOMETRIAL POLYP: ICD-10-CM

## 2022-08-15 DIAGNOSIS — N92.1 MENOMETRORRHAGIA: ICD-10-CM

## 2022-08-15 PROCEDURE — 99214 OFFICE O/P EST MOD 30 MIN: CPT | Performed by: OBSTETRICS & GYNECOLOGY

## 2022-08-15 NOTE — PROGRESS NOTES
Naheed is a 32 year old   is here today to discuss her menorrhagia and the results of her ultrasound.  She reports heavier cycles since the birth of her son 4+ years ago.  They are also now more irregular, but last up to 2 weeks with cramping and passing clots..       ROS: Pertinent ROS as above.    Gyn Hx:      Past Medical History:   Diagnosis Date     Anemia      ASCUS with positive high risk HPV 2012    types 16, 53 & 59     H/O colposcopy with cervical biopsy 2013    PAUL 2 & 3     Tobacco use disorder      Past Surgical History:   Procedure Laterality Date      SECTION N/A 12/15/2017    Procedure:  SECTION;;  Surgeon: Larissa Doyle MD;  [facility]     LEEP TX, CERVICAL  2013    PAUL 3     SALPINGECTOMY      Done with C/Section 2017     Patient Active Problem List   Diagnosis     Severe dysplasia of cervix (PAUL III)     Tobacco use disorder     Severe dysplasia of cervix     Sterilization consult      premature rupture of membranes (PPROM) delivered, current hospitalization     S/P  section     Bilateral carpal tunnel syndrome     Abnormal uterine bleeding due to endometrial polyp       ALL/Meds: Her medication and allergy histories were reviewed and are documented in their appropriate chart areas.    SH: Reviewed and documented in the appropriate area of the chart.  FH:  Her family history is reviewed and updated in the chart, today.  PMH: Her past medical, surgical, and obstetric histories were reviewed and updated today in the appropriate chart areas.    PE: /81 (BP Location: Left arm, Patient Position: Sitting, Cuff Size: Adult Large)   Pulse 74   Wt 131.4 kg (289 lb 9.6 oz)   LMP 2022   SpO2 97%   BMI 44.03 kg/m    Body mass index is 44.03 kg/m .    Pertinent Physical exam findings:    General Appearance:  healthy, alert, active, no distress    U/S:  UTERUS: 5.2 x 5.7 x 8.3 cm. Normal in size and position with  no  masses.     ENDOMETRIUM: 10 mm. Small endometrium. 8 x 7 x 9 mm polyploid more  echogenic observation in the mid endometrium, with vascularity on  Doppler evaluation. This may represent an endometrial polyp.     RIGHT OVARY: 2.7 x 1.4 x 2.2 cm. Normal.     LEFT OVARY: 2.8 x 1.4 x 2.8 cm. Normal.     Trace retrouterine free fluid.                                                                      IMPRESSION:  1.  Possible small endometrial polyp. This could be further evaluated  with saline hysterosonogram. The endometrial stripe is 10 mm in  thickness.     2.  Trace nonspecific retrouterine free fluid.     3.  Otherwise normal pelvic ultrasound.    Discussed with Naheed, the options for treatment.  We discussed medical management including both oral and non-oral options.  We discussed the risks and benefits of cyclic and continuous estrogen/progestin combinations including thrombotic events.  We discussed cyclic and and continuous progestins including breakthough bleeding.    We discussed surgical options including, but not limited to endometrial ablation and hysterectomy.  I explained the risks of both procedures including bleeding, infection, and injury to other structures such as the bladder, ureters and bowel.    Discussed endometrial ablation.  Discussed hysteroscopy and how it is performed.  Explained the outpatient nature and post-operative recovery.  Discussed the success rate including 50% amenorrhea, 40% improvement and 10% failure.  Discussed the need for permanent birth control. She is using tubal for birth control.  Discussed the need for sampling of the endometrium prior to the ablation.  Discussed the option of an in-office ENDOMETRIAL BIOPSY versus hysteroscopic directed biopsy done immediately prior to the ablation.  She does understand that we would not have the results of that biopsy back prior to the ablation.  If the results were to show a pre-malignant or malignant process, she  understands that she would have to come back at a later date for a hysterectomy.          A/P:(N93.9,  N84.0) Abnormal uterine bleeding due to endometrial polyp  Comment: 45 minutes spent on the date of the encounter doing chart review, review of test results, patient visit and documentation   Plan: She wants to discuss with her family.  Would plan either Diagnostic Hysteroscopy with Polypectomy or Diagnostic Hysteroscopy with polypectomy and Endometrial ablation with Novasure         -     - No orders of the defined types were placed in this encounter.

## 2022-08-15 NOTE — PATIENT INSTRUCTIONS
If you have any questions regarding your visit, Please contact your care team.     [facility]: [phone]    To Schedule an Appointment 24/7  Call: [phone] TELEPHONE [facility]     Jose Dorsey MD  Medical Director    Eldon Caro-RONEN Cabral-Surgery Scheduler  Rosaura-Surgery Scheduler               Tuesday-[place]  7:30 a.m-4:30 p.m    Thursday-[place]  7:30 a.m-4:30 p.m    Typical Surgery Days: Tuesday or Friday [facility] [place]  [address] [place] [address]  PH: [phone]     Imaging Scheduling all locations  PH: [phone]     [facility]  [address] [place] [address] [place] [address]  PH: [phone]    [facility]  [address] [place] [address]  PH: [phone] Fax      **Surgeries** Our Surgery Schedulers will contact you to schedule. If you do not receive a call within 3 business days, please call [phone].    Urgent Care locations:  [facility] Monday-Friday  10 am - 8 pm  Saturday and Sunday   9 am - 5 pm  Monday-Friday   10 am - 8 pm  Saturday and Sunday   9 am - 5 pm   [phone]   If you need a medication refill, please contact your pharmacy. Please allow 3 business days for your refill to be completed.  As always, Thank you for trusting us with your healthcare needs!    see additional instructions from your care team below

## 2022-08-15 NOTE — TELEPHONE ENCOUNTER
"Per 8/15/22 OV plan with Dr. Dorsey:  \"Plan: She wants to discuss with her family.  Would plan either Diagnostic Hysteroscopy with Polypectomy or Diagnostic Hysteroscopy with polypectomy and Endometrial ablation with Novasure\"    Pt is writing in stating she would like to move forward with the hysteroscopy and uterine ablation.    Routing to Dr. Dorsey to place the case request as appropriate.    Vania Robledo RN    "

## 2022-08-16 ENCOUNTER — TELEPHONE (OUTPATIENT)
Dept: OBGYN | Facility: CLINIC | Age: 32
End: 2022-08-16

## 2022-08-16 PROBLEM — N92.1 MENOMETRORRHAGIA: Status: ACTIVE | Noted: 2022-08-16

## 2022-08-16 NOTE — TELEPHONE ENCOUNTER
Naheed Cee  Female, 32 year old, 1990    MRN:   [id number]  Phone:   [phone] (M)    Jose Dorsey: Case request order has been signed for Naheed Cee (CaseID: [id number])  Received: Yesterday  Jose Dorsey MD  [facility] Surg Sched Pool  Patient Name: Naheed Cee   MRN: [id number]   Case#: [id number]   Surgeon(s) and Role:      * Jose Dorsey MD - Primary   Date requested: * No dates entered *   Location:  OR   Procedure(s):   Diagnostic hysteroscopy, possible polypectomy (N/A)   Endometrial ablation (N/A)     SURGERY SCHEDULING AND PRECERTIFICATION    Medical Record Number: [id number]  Naheed Cee  YOB: 1990   Phone: [phone] (home)   Primary Provider: Agbeh, Cephas Mawuena    Reason for Admit:Menometrorrhagia [N92.1]  Abnormal uterine bleeding due to endometrial polyp [N93.9, N84.0]      Surgeon: Jose Dorsey MD  Surgical Procedure: Diagnostic hysteroscopy, possible polypectomy (N/A)   Endometrial ablation (N/A)     Date of Surgery 10/4/22 Time of Surgery 7:45 am   Surgery to be performed at:  [facility]   Status: Outpatient  Type of Anesthesia Anticipated: Local with MAC    Sterilization consent:  Not applicable to procedure being performed.    Pre-Op: On 09/27/2022 with Lottie Cunha in [place] at 3:40 pm  COVID testing:  Patient is going to do the at-home rapid antigen test 1-2 days before the procedure and bring the results with on the day of the procedure.   Post-Op: Not needed     Pre-certification routed to [employer]:  Auto routes via Case Request    Surgery packet mailed to patient's home address: Yes  Patient instructed NPO 12 hours prior to surgery, arrive according to the time the nurse gives patient when called prior to surgery, must have a .  Patient understood and agrees to the plan.      Requestor:  Marianna Gavin     Location:  [place] [phone]

## 2022-09-23 NOTE — PROGRESS NOTES
[facility]  [address] [facility] [address]  Phone: [phone]  Primary Provider: Agbeh, Cephas Mawuena  Pre-op Performing Provider: NED STALLINGS      PREOPERATIVE EVALUATION:  Today's date: 2022    Naheed Cee is a 32 year old female who presents for a preoperative evaluation.    Surgical Information:  Surgery/Procedure: Diagnostic hysteroscopy, possible polypectomy  Surgery Location: [facility]  Surgeon: Dr. Dorsey  Surgery Date: 10/04/2022  Time of Surgery: 7:55am  Where patient plans to recover: At home with family  Fax number for surgical facility: [phone]    Type of Anesthesia Anticipated: to be determined    Assessment & Plan     The proposed surgical procedure is considered LOW risk.    Preop general physical exam    - Hemoglobin; Future    Abnormal uterine bleeding due to endometrial polyp    - Hemoglobin; Future    Morbid obesity (H)             Risks and Recommendations:  The patient has the following additional risks and recommendations for perioperative complications:   - Morbid obesity (BMI >40)    Medication Instructions:  Patient is on no chronic medications    RECOMMENDATION:  Assuming hemoglobin is reasonable, APPROVAL GIVEN to proceed with proposed procedure, without further diagnostic evaluation.          Already stopped ibuprofen, tylenol is okay.     Patient unclear about soap instructions, she will route message to Dr. Dorsey to clarify. I asked another OBGYN provider but they were not sure at this time.     Billin min spent on patient today including chart review, history, exam, and explaining treatment plan and follow-up.       Subjective     HPI related to upcoming procedure: has polyp will be having the above procedure    Will do at home covid test. Just needs a picture.      and kids are with today.     Preop Questions 2022   1. Have you ever had a heart attack or stroke? No   2. Have you ever had surgery on your  heart or blood vessels, such as a stent placement, a coronary artery bypass, or surgery on an artery in your head, neck, heart, or legs? No   3. Do you have chest pain with activity? No   4. Do you have a history of  heart failure? No   5. Do you currently have a cold, bronchitis or symptoms of other infection? No   6. Do you have a cough, shortness of breath, or wheezing? No   7. Do you or anyone in your family have previous history of blood clots? No   8. Do you or does anyone in your family have a serious bleeding problem such as prolonged bleeding following surgeries or cuts? No   9. Have you ever had problems with anemia or been told to take iron pills? YES -    10. Have you had any abnormal blood loss such as black, tarry or bloody stools, or abnormal vaginal bleeding? No   11. Have you ever had a blood transfusion? No   12. Are you willing to have a blood transfusion if it is medically needed before, during, or after your surgery? Yes   13. Have you or any of your relatives ever had problems with anesthesia? No   14. Do you have sleep apnea, excessive snoring or daytime drowsiness? No   15. Do you have any artifical heart valves or other implanted medical devices like a pacemaker, defibrillator, or continuous glucose monitor? No   16. Do you have artificial joints? No   17. Are you allergic to latex? No   18. Is there any chance that you may be pregnant? No     Health Care Directive:  Patient does not have a Health Care Directive or Living Will: Discussed advance care planning with patient; however, patient declined at this time.    Preoperative Review of :   reviewed - no record of controlled substances prescribed.      Status of Chronic Conditions:  See problem list for active medical problems.  Problems all longstanding and stable, except as noted/documented.  See ROS for pertinent symptoms related to these conditions.      Review of Systems  Constitutional, neuro, ENT, endocrine, pulmonary, cardiac,  gastrointestinal, genitourinary, musculoskeletal, integument and psychiatric systems are negative, except as otherwise noted.    Patient Active Problem List    Diagnosis Date Noted     Menometrorrhagia 2022     Priority: Medium     Added automatically from request for surgery [id number]       Abnormal uterine bleeding due to endometrial polyp 08/15/2022     Priority: Medium     Bilateral carpal tunnel syndrome 2018     Priority: Medium     S/P  section 12/15/2017     Priority: Medium      premature rupture of membranes (PPROM) delivered, current hospitalization 2017     Priority: Medium     Sterilization consult 2017     Priority: Medium     Severe dysplasia of cervix 2013     Priority: Medium     Tobacco use disorder      Priority: Medium     Severe dysplasia of cervix (PAUL III) 2012     Priority: Medium     ASCUS Pap, + HPV at previous clinic.   12 ASCUS, + HR HPV 16, 53 & 59. Plan colp-  13 [place] PAUL 2 & 3. Plan LEEP  13 LEEP PAUL 3. Repeat pap 3 months.    13 NIL. Repeat pap 6 months.    Gap in care  17 NIL Pap, neg HPV. Plan: cotest in 1 year   Patient is lost to pap tracking follow-up  21 NIL pap, Neg HPV. Plan: cotest in 1 year  22 NIL pap, neg HPV. Plan: cotest every 3 years          Past Medical History:   Diagnosis Date     Anemia      ASCUS with positive high risk HPV 2012    types 16, 53 & 59     H/O colposcopy with cervical biopsy 2013    PAUL 2 & 3     Tobacco use disorder      Past Surgical History:   Procedure Laterality Date      SECTION N/A 12/15/2017    Procedure:  SECTION;;  Surgeon: Larissa Doyle MD;  Location: [facility]     LEEP TX, CERVICAL  2013    PAUL 3     SALPINGECTOMY      Done with C/Section 2017     No current outpatient medications on file.       Allergies   Allergen Reactions     Amoxicillin Nausea and Vomiting and Itching        Social History     Tobacco Use     Smoking  status: Former Smoker     Packs/day: 0.50     Years: 14.00     Pack years: 7.00     Types: Cigarettes     Quit date: 2017     Years since quittin.8     Smokeless tobacco: Never Used   Substance Use Topics     Alcohol use: No     Family History   Problem Relation Age of Onset     Hypertension Father      Heart Disease Maternal Grandmother      Heart Disease Maternal Grandfather      Breast Cancer Paternal Grandmother      History   Drug Use No         Objective     /81   Pulse 97   Temp 98.7  F (37.1  C) (Tympanic)   Resp 16   Wt 130.2 kg (287 lb)   LMP 2022 (Approximate)   SpO2 96%   Breastfeeding No   BMI 43.64 kg/m      Physical Exam    GENERAL APPEARANCE: alert, active and over weight     EYES: EOMI, PERRL     HENT: ear canals and TM's normal and nose and mouth without ulcers or lesions     NECK: no adenopathy, no asymmetry, masses, or scars and thyroid normal to palpation     RESP: lungs clear to auscultation - no rales, rhonchi or wheezes     CV: regular rates and rhythm, normal S1 S2, no S3 or S4 and no murmur, click or rub     MS: extremities normal- no gross deformities noted, no evidence of inflammation in joints, FROM in all extremities.     SKIN: no suspicious lesions or rashes     NEURO: Normal strength and tone, sensory exam grossly normal, mentation intact and speech normal     PSYCH: mentation appears normal. and affect normal/bright     LYMPHATICS: No cervical adenopathy    Recent Labs   Lab Test 22  1104 21  0931   HGB 14.0 10.4*   PLT  --  380      Had normal blood sugar in past year.     Diagnostics:  Had tubal ligation. No need for pregnancy test.     No EKG this visit, completed in the last 90 days.    Revised Cardiac Risk Index (RCRI):  The patient has the following serious cardiovascular risks for perioperative complications:   - No serious cardiac risks = 0 points     RCRI Interpretation: 0 points: Class I (very low risk - 0.4% complication  rate)           Signed Electronically by: Lottie Cunha PA-C  Copy of this evaluation report is provided to requesting physician.

## 2022-09-23 NOTE — PATIENT INSTRUCTIONS
Lifestyle recommendations:  Being overweight or obese puts you are risk of major health problems including but not limited to: heart disease/heart attack, stroke, high cholesterol, high blood pressure, and diabetes.  This is why it is important to be at a healthy weight for your height.     Exercise 30 minutes 3-5 times a week, if you can only do 10 minutes 3 times a week that is still shown to have great benefit!  Brisk walking even counts for this.  Consider free youtube videos for exercise that fits your needs and lifestyle.     Monitor your caffeine and soda intake, try to minimize these beverages    Drink plenty of water (about 70-80 ounces a day)    Try to eat a vegetable and fruit  with lunch and dinner.  Have a breakfast that contains protein such as eggs or oatmeal.  Decrease your white bread, pasta, and sweets intake.  Increase lean proteins like chicken or pork. Try to eat out 1-2 times a week or less.  Monitor your portion sizes, try using smaller plates if needed.  Eat slowly, this gives you time to be aware that your body is full.   Let me know at any time if you would like a referral to a nutritionist!      Preparing for Your Surgery  Getting started  A nurse will call you to review your health history and instructions. They will give you an arrival time based on your scheduled surgery time. Please be ready to share:  Your doctor's clinic name and phone number  Your medical, surgical and anesthesia history  A list of allergies and sensitivities  A list of medicines, including herbal treatments and over-the-counter drugs  Whether the patient has a legal guardian (ask how to send us the papers in advance)  Please tell us if you're pregnant--or if there's any chance you might be pregnant. Some surgeries may injure a fetus (unborn baby), so they require a pregnancy test. Surgeries that are safe for a fetus don't always need a test, and you can choose whether to have one.   If you have a child who's having  surgery, please ask for a copy of Preparing for Your Child's Surgery.    Preparing for surgery  Within 30 days of surgery: Have a pre-op exam (sometimes called an H&P, or History and Physical). This can be done at a clinic or pre-operative center.  If you're having a , you may not need this exam. Talk to your care team.  At your pre-op exam, talk to your care team about all medicines you take. If you need to stop any medicines before surgery, ask when to start taking them again.  We do this for your safety. Many medicines can make you bleed too much during surgery. Some change how well surgery (anesthesia) drugs work.  Call your insurance company to let them know you're having surgery. (If you don't have insurance, call [phone].)  Call your clinic if there's any change in your health. This includes signs of a cold or flu (sore throat, runny nose, cough, rash, fever). It also includes a scrape or scratch near the surgery site.  If you have questions on the day of surgery, call your hospital or surgery center.  COVID testing  You may need to be tested for COVID-19 before having surgery. If so, we will give you instructions.  Eating and drinking guidelines  For your safety: Unless your surgeon tells you otherwise, follow the guidelines below.  Eat and drink as usual until 8 hours before surgery. After that, no food or milk.  Drink clear liquids until 2 hours before surgery. These are liquids you can see through, like water, Gatorade and Propel Water. You may also have black coffee and tea (no cream or milk).  Nothing by mouth within 2 hours of surgery. This includes gum, candy and breath mints.  If you drink alcohol: Stop drinking it the night before surgery.  If your care team tells you to take medicine on the morning of surgery, it's okay to take it with a sip of water.  Preventing infection  Shower or bathe the night before and morning of your surgery. Follow the instructions your clinic gave you. (If  no instructions, use regular soap.)  Don't shave or clip hair near your surgery site. We'll remove the hair if needed.  Don't smoke or vape the morning of surgery. You may chew nicotine gum up to 2 hours before surgery. A nicotine patch is okay.  Note: Some surgeries require you to completely quit smoking and nicotine. Check with your surgeon.  Your care team will make every effort to keep you safe from infection. We will:  Clean our hands often with soap and water (or an alcohol-based hand rub).  Clean the skin at your surgery site with a special soap that kills germs.  Give you a special gown to keep you warm. (Cold raises the risk of infection.)  Wear special hair covers, masks, gowns and gloves during surgery.  Give antibiotic medicine, if prescribed. Not all surgeries need antibiotics.  What to bring on the day of surgery  Photo ID and insurance card  Copy of your health care directive, if you have one  Glasses and hearing aides (bring cases)  You can't wear contacts during surgery  Inhaler and eye drops, if you use them (tell us about these when you arrive)  CPAP machine or breathing device, if you use them  A few personal items, if spending the night  If you have . . .  A pacemaker, ICD (cardiac defibrillator) or other implant: Bring the ID card.  An implanted stimulator: Bring the remote control.  A legal guardian: Bring a copy of the certified (court-stamped) guardianship papers.  Please remove any jewelry, including body piercings. Leave jewelry and other valuables at home.  If you're going home the day of surgery  You must have a responsible adult drive you home. They should stay with you overnight as well.  If you don't have someone to stay with you, and you aren't safe to go home alone, we may keep you overnight. Insurance often won't pay for this.  After surgery  If it's hard to control your pain or you need more pain medicine, please call your surgeon's office.  Questions?   If you have any questions  for your care team, list them here: _________________________________________________________________________________________________________________________________________________________________________ ____________________________________ ____________________________________ ____________________________________  For informational purposes only. Not to replace the advice of your health care provider. Copyright   2003, 2019 [facility]. All rights reserved. Clinically reviewed by Eun Crawford MD. [employer] 525300 - REV 07/21.

## 2022-09-27 ENCOUNTER — OFFICE VISIT (OUTPATIENT)
Dept: FAMILY MEDICINE | Facility: CLINIC | Age: 32
End: 2022-09-27
Payer: COMMERCIAL

## 2022-09-27 ENCOUNTER — MYC MEDICAL ADVICE (OUTPATIENT)
Dept: OBGYN | Facility: CLINIC | Age: 32
End: 2022-09-27

## 2022-09-27 VITALS
SYSTOLIC BLOOD PRESSURE: 122 MMHG | DIASTOLIC BLOOD PRESSURE: 81 MMHG | HEART RATE: 97 BPM | RESPIRATION RATE: 16 BRPM | BODY MASS INDEX: 43.64 KG/M2 | TEMPERATURE: 98.7 F | WEIGHT: 287 LBS | OXYGEN SATURATION: 96 %

## 2022-09-27 DIAGNOSIS — E66.01 MORBID OBESITY (H): ICD-10-CM

## 2022-09-27 DIAGNOSIS — N84.0 ABNORMAL UTERINE BLEEDING DUE TO ENDOMETRIAL POLYP: ICD-10-CM

## 2022-09-27 DIAGNOSIS — N93.9 ABNORMAL UTERINE BLEEDING DUE TO ENDOMETRIAL POLYP: ICD-10-CM

## 2022-09-27 DIAGNOSIS — Z01.818 PREOP GENERAL PHYSICAL EXAM: Primary | ICD-10-CM

## 2022-09-27 LAB — HGB BLD-MCNC: 12.7 G/DL (ref 11.7–15.7)

## 2022-09-27 PROCEDURE — 99215 OFFICE O/P EST HI 40 MIN: CPT | Performed by: PHYSICIAN ASSISTANT

## 2022-09-27 PROCEDURE — 85018 HEMOGLOBIN: CPT | Performed by: PHYSICIAN ASSISTANT

## 2022-09-27 PROCEDURE — 36415 COLL VENOUS BLD VENIPUNCTURE: CPT | Performed by: PHYSICIAN ASSISTANT

## 2022-09-27 NOTE — RESULT ENCOUNTER NOTE
Hetal Farfan,       Your recent test results are attached, if you have any questions or concerns please feel free to contact me via e-mail or call [phone].  Hemoglobin normal.       It was a pleasure to see you at your recent office visit.      Sincerely,  Lottie Cunha PA-C

## 2022-10-03 ENCOUNTER — ANESTHESIA EVENT (OUTPATIENT)
Dept: SURGERY | Facility: AMBULATORY SURGERY CENTER | Age: 32
End: 2022-10-03
Payer: COMMERCIAL

## 2022-10-03 ASSESSMENT — LIFESTYLE VARIABLES: TOBACCO_USE: 1

## 2022-10-03 NOTE — TELEPHONE ENCOUNTER
Contacted patient reviewed showering before surgery handout with her and also gave her the number for pre-surgical RN's at [facility] [place] [phone] if any other questions arise.

## 2022-10-04 ENCOUNTER — ANESTHESIA (OUTPATIENT)
Dept: SURGERY | Facility: AMBULATORY SURGERY CENTER | Age: 32
End: 2022-10-04
Payer: COMMERCIAL

## 2022-10-04 ENCOUNTER — HOSPITAL ENCOUNTER (OUTPATIENT)
Facility: AMBULATORY SURGERY CENTER | Age: 32
Discharge: HOME | End: 2022-10-04
Attending: OBSTETRICS & GYNECOLOGY | Admitting: OBSTETRICS & GYNECOLOGY
Payer: COMMERCIAL

## 2022-10-04 VITALS
DIASTOLIC BLOOD PRESSURE: 76 MMHG | OXYGEN SATURATION: 93 % | WEIGHT: 287 LBS | RESPIRATION RATE: 22 BRPM | SYSTOLIC BLOOD PRESSURE: 120 MMHG | HEART RATE: 65 BPM | BODY MASS INDEX: 43.64 KG/M2 | TEMPERATURE: 97.7 F

## 2022-10-04 DIAGNOSIS — Z98.890 POST-OPERATIVE STATE: Primary | ICD-10-CM

## 2022-10-04 DIAGNOSIS — N92.1 MENOMETRORRHAGIA: ICD-10-CM

## 2022-10-04 LAB — B-HCG SERPL-ACNC: <1 IU/L (ref 0–5)

## 2022-10-04 PROCEDURE — G8918 PT W/O PREOP ORDER IV AB PRO: HCPCS

## 2022-10-04 PROCEDURE — 58563 HYSTEROSCOPY ABLATION: CPT

## 2022-10-04 PROCEDURE — 88305 TISSUE EXAM BY PATHOLOGIST: CPT | Performed by: PATHOLOGY

## 2022-10-04 PROCEDURE — 84702 CHORIONIC GONADOTROPIN TEST: CPT | Performed by: OBSTETRICS & GYNECOLOGY

## 2022-10-04 PROCEDURE — G8907 PT DOC NO EVENTS ON DISCHARG: HCPCS

## 2022-10-04 PROCEDURE — 58563 HYSTEROSCOPY ABLATION: CPT | Performed by: OBSTETRICS & GYNECOLOGY

## 2022-10-04 RX ORDER — FENTANYL CITRATE 50 UG/ML
INJECTION, SOLUTION INTRAMUSCULAR; INTRAVENOUS PRN
Status: DISCONTINUED | OUTPATIENT
Start: 2022-10-04 | End: 2022-10-04

## 2022-10-04 RX ORDER — FENTANYL CITRATE 50 UG/ML
25 INJECTION, SOLUTION INTRAMUSCULAR; INTRAVENOUS
Status: DISCONTINUED | OUTPATIENT
Start: 2022-10-04 | End: 2022-10-05 | Stop reason: HOSPADM

## 2022-10-04 RX ORDER — ACETAMINOPHEN 325 MG/1
975 TABLET ORAL ONCE
Status: DISCONTINUED | OUTPATIENT
Start: 2022-10-04 | End: 2022-10-05 | Stop reason: HOSPADM

## 2022-10-04 RX ORDER — DIAZEPAM 10 MG/2ML
2.5 INJECTION, SOLUTION INTRAMUSCULAR; INTRAVENOUS
Status: DISCONTINUED | OUTPATIENT
Start: 2022-10-04 | End: 2022-10-05 | Stop reason: HOSPADM

## 2022-10-04 RX ORDER — SODIUM CHLORIDE, SODIUM LACTATE, POTASSIUM CHLORIDE, CALCIUM CHLORIDE 600; 310; 30; 20 MG/100ML; MG/100ML; MG/100ML; MG/100ML
INJECTION, SOLUTION INTRAVENOUS CONTINUOUS
Status: DISCONTINUED | OUTPATIENT
Start: 2022-10-04 | End: 2022-10-05 | Stop reason: HOSPADM

## 2022-10-04 RX ORDER — IBUPROFEN 400 MG/1
800 TABLET, FILM COATED ORAL ONCE
Status: DISCONTINUED | OUTPATIENT
Start: 2022-10-04 | End: 2022-10-05 | Stop reason: HOSPADM

## 2022-10-04 RX ORDER — ONDANSETRON 4 MG/1
4 TABLET, ORALLY DISINTEGRATING ORAL EVERY 30 MIN PRN
Status: DISCONTINUED | OUTPATIENT
Start: 2022-10-04 | End: 2022-10-05 | Stop reason: HOSPADM

## 2022-10-04 RX ORDER — ACETAMINOPHEN 325 MG/1
975 TABLET ORAL ONCE
Status: COMPLETED | OUTPATIENT
Start: 2022-10-04 | End: 2022-10-04

## 2022-10-04 RX ORDER — OXYCODONE HYDROCHLORIDE 5 MG/1
5 TABLET ORAL EVERY 4 HOURS PRN
Status: DISCONTINUED | OUTPATIENT
Start: 2022-10-04 | End: 2022-10-05 | Stop reason: HOSPADM

## 2022-10-04 RX ORDER — MEPERIDINE HYDROCHLORIDE 25 MG/ML
12.5 INJECTION INTRAMUSCULAR; INTRAVENOUS; SUBCUTANEOUS
Status: DISCONTINUED | OUTPATIENT
Start: 2022-10-04 | End: 2022-10-05 | Stop reason: HOSPADM

## 2022-10-04 RX ORDER — PROPOFOL 10 MG/ML
INJECTION, EMULSION INTRAVENOUS PRN
Status: DISCONTINUED | OUTPATIENT
Start: 2022-10-04 | End: 2022-10-04

## 2022-10-04 RX ORDER — OXYCODONE HYDROCHLORIDE 5 MG/1
5-10 TABLET ORAL EVERY 4 HOURS PRN
Qty: 6 TABLET | Refills: 0 | Status: SHIPPED | OUTPATIENT
Start: 2022-10-04 | End: 2022-11-18

## 2022-10-04 RX ORDER — BUPIVACAINE HYDROCHLORIDE AND EPINEPHRINE 5; 5 MG/ML; UG/ML
INJECTION, SOLUTION PERINEURAL PRN
Status: DISCONTINUED | OUTPATIENT
Start: 2022-10-04 | End: 2022-10-04 | Stop reason: HOSPADM

## 2022-10-04 RX ORDER — FENTANYL CITRATE 50 UG/ML
25 INJECTION, SOLUTION INTRAMUSCULAR; INTRAVENOUS EVERY 5 MIN PRN
Status: DISCONTINUED | OUTPATIENT
Start: 2022-10-04 | End: 2022-10-05 | Stop reason: HOSPADM

## 2022-10-04 RX ORDER — ONDANSETRON 2 MG/ML
4 INJECTION INTRAMUSCULAR; INTRAVENOUS EVERY 30 MIN PRN
Status: DISCONTINUED | OUTPATIENT
Start: 2022-10-04 | End: 2022-10-05 | Stop reason: HOSPADM

## 2022-10-04 RX ORDER — OXYCODONE HYDROCHLORIDE 5 MG/1
5 TABLET ORAL
Status: COMPLETED | OUTPATIENT
Start: 2022-10-04 | End: 2022-10-04

## 2022-10-04 RX ORDER — ALBUTEROL SULFATE 0.83 MG/ML
2.5 SOLUTION RESPIRATORY (INHALATION) EVERY 4 HOURS PRN
Status: DISCONTINUED | OUTPATIENT
Start: 2022-10-04 | End: 2022-10-05 | Stop reason: HOSPADM

## 2022-10-04 RX ORDER — ONDANSETRON 2 MG/ML
INJECTION INTRAMUSCULAR; INTRAVENOUS PRN
Status: DISCONTINUED | OUTPATIENT
Start: 2022-10-04 | End: 2022-10-04

## 2022-10-04 RX ORDER — LIDOCAINE 40 MG/G
CREAM TOPICAL
Status: DISCONTINUED | OUTPATIENT
Start: 2022-10-04 | End: 2022-10-05 | Stop reason: HOSPADM

## 2022-10-04 RX ORDER — KETOROLAC TROMETHAMINE 30 MG/ML
30 INJECTION, SOLUTION INTRAMUSCULAR; INTRAVENOUS ONCE
Status: COMPLETED | OUTPATIENT
Start: 2022-10-04 | End: 2022-10-04

## 2022-10-04 RX ORDER — LIDOCAINE HYDROCHLORIDE 20 MG/ML
INJECTION, SOLUTION INFILTRATION; PERINEURAL PRN
Status: DISCONTINUED | OUTPATIENT
Start: 2022-10-04 | End: 2022-10-04

## 2022-10-04 RX ADMIN — PROPOFOL 150 MCG/KG/MIN: 10 INJECTION, EMULSION INTRAVENOUS at 08:02

## 2022-10-04 RX ADMIN — ACETAMINOPHEN 975 MG: 325 TABLET ORAL at 07:13

## 2022-10-04 RX ADMIN — SODIUM CHLORIDE, SODIUM LACTATE, POTASSIUM CHLORIDE, CALCIUM CHLORIDE: 600; 310; 30; 20 INJECTION, SOLUTION INTRAVENOUS at 07:23

## 2022-10-04 RX ADMIN — OXYCODONE HYDROCHLORIDE 5 MG: 5 TABLET ORAL at 08:52

## 2022-10-04 RX ADMIN — LIDOCAINE HYDROCHLORIDE 100 MG: 20 INJECTION, SOLUTION INFILTRATION; PERINEURAL at 07:57

## 2022-10-04 RX ADMIN — ONDANSETRON 4 MG: 2 INJECTION INTRAMUSCULAR; INTRAVENOUS at 08:03

## 2022-10-04 RX ADMIN — PROPOFOL 70 MG: 10 INJECTION, EMULSION INTRAVENOUS at 08:01

## 2022-10-04 RX ADMIN — FENTANYL CITRATE 50 MCG: 50 INJECTION, SOLUTION INTRAMUSCULAR; INTRAVENOUS at 07:59

## 2022-10-04 RX ADMIN — KETOROLAC TROMETHAMINE 30 MG: 30 INJECTION, SOLUTION INTRAMUSCULAR; INTRAVENOUS at 07:24

## 2022-10-04 NOTE — ANESTHESIA POSTPROCEDURE EVALUATION
Patient: Naheed Cee    Procedure: Procedure(s):  Diagnostic hysteroscopy, polypectomy  Endometrial ablation       Anesthesia Type:  MAC    Note:  Disposition: Outpatient   Postop Pain Control: Uneventful            Sign Out: Well controlled pain   PONV: No   Neuro/Psych: Uneventful            Sign Out: Acceptable/Baseline neuro status   Airway/Respiratory: Uneventful            Sign Out: Acceptable/Baseline resp. status   CV/Hemodynamics: Uneventful            Sign Out: Acceptable CV status; No obvious hypovolemia; No obvious fluid overload   Other NRE: NONE   DID A NON-ROUTINE EVENT OCCUR? No           Last vitals:  Vitals Value Taken Time   /76 10/04/22 0900   Temp 97.7  F (36.5  C) 10/04/22 0900   Pulse 65 10/04/22 0900   Resp 22 10/04/22 0900   SpO2 94 % 10/04/22 0902   Vitals shown include unvalidated device data.    Electronically Signed By: Rafael Degroot MD  October 4, 2022  1:54 PM

## 2022-10-04 NOTE — OP NOTE
Procedure:  Hysteroscopic Polypectomy, Endometrial ablation  Pre-Op Dx:  Menometroirrhagia  Post-Op Dx:   Same, endometrial polyp  Surgeon:  Jose Dorsey  Assistants:[facility] staff    AnesthesiaLocal and IV sedation    Findings:  Endometrial polyp, thickened endometrium, otherwise noraml endometrial cavity    Procedure:  The patient was taken to the operating room where anesthesia was induced and found to be adequate.  She was then placed in a dorsal lithotomy position and prepped and draped in a sterile fashion.  The cervix was visualized and grasped with a single toothed tenaculum.  It was sounded to a depth of 9cm and a cervical length of 3.  A 30 degree hysteroscope was then placed into the uterine cavity.  The cavity was distended with normal saline and then inspected.  There was an endometrial polyp and noted to be otherwise normal.    The Myosure device was inserted and the polyp was removed.  The scope was then removed.  The Novasure device was then inserted and the cavity width measured to 2.9cm.  The cavity integrity was verified.  The ablation sequence was then started and treatment lasted 1.34 minutes with a power setting of 96.  Once the the treatment cycle was finished, the system was then removed.  The tenaculum was then removed.  The patient tolerated the procedure well.    Counts were correct times 2  EBL:5cc cc  Fluid balance:  15cc NaCl  Pathology:  Endometrial polyp    She was then taken to the recovery room in stable condition.

## 2022-10-04 NOTE — ANESTHESIA PREPROCEDURE EVALUATION
Anesthesia Pre-Procedure Evaluation    Patient: Naheed Cee   MRN: [id number] : 1990        Procedure : Procedure(s):  Diagnostic hysteroscopy, possible polypectomy  Endometrial ablation          Past Medical History:   Diagnosis Date     Anemia      ASCUS with positive high risk HPV 2012    types 16, 53 & 59     H/O colposcopy with cervical biopsy 2013    PAUL 2 & 3     Tobacco use disorder       Past Surgical History:   Procedure Laterality Date      SECTION N/A 12/15/2017    Procedure:  SECTION;;  Surgeon: Larissa Doyle MD;  Location: [facility]     LEEP TX, CERVICAL  2013    PAUL 3     SALPINGECTOMY      Done with C/Section 2017      Allergies   Allergen Reactions     Amoxicillin Nausea and Vomiting and Itching      Social History     Tobacco Use     Smoking status: Former Smoker     Packs/day: 0.50     Years: 14.00     Pack years: 7.00     Types: Cigarettes     Quit date: 2017     Years since quittin.8     Smokeless tobacco: Never Used   Substance Use Topics     Alcohol use: No      Wt Readings from Last 1 Encounters:   22 130.2 kg (287 lb)        Anesthesia Evaluation   Pt has had prior anesthetic.     No history of anesthetic complications       ROS/MED HX  ENT/Pulmonary:     (+) tobacco use, Past use,     Neurologic:       Cardiovascular:    (-) murmur   METS/Exercise Tolerance: >4 METS    Hematologic:       Musculoskeletal:       GI/Hepatic:       Renal/Genitourinary:       Endo:     (+) Obesity,     Psychiatric/Substance Use:       Infectious Disease:       Malignancy:       Other:            Physical Exam    Airway        Mallampati: III   TM distance: > 3 FB   Neck ROM: full   Mouth opening: > 3 cm    Respiratory Devices and Support         Dental  no notable dental history         Cardiovascular          Rhythm and rate: regular and normal (-) no murmur    Pulmonary   pulmonary exam normal        breath sounds clear to auscultation           OUTSIDE  LABS:  CBC:   Lab Results   Component Value Date    WBC 5.8 06/16/2021    WBC 8.8 11/16/2017    HGB 12.7 09/27/2022    HGB 14.0 07/07/2022    HCT 34.8 (L) 06/16/2021    HCT 32.7 (L) 11/16/2017     06/16/2021     12/11/2017     BMP:   Lab Results   Component Value Date    CR 0.44 (L) 12/11/2017    GLC 98 07/24/2022    GLC 92 06/26/2021     COAGS: No results found for: PTT, INR, FIBR  POC:   Lab Results   Component Value Date    HCG Positive (A) 06/12/2013     HEPATIC:   Lab Results   Component Value Date    ALT 19 12/11/2017    AST 10 12/11/2017     OTHER:   Lab Results   Component Value Date    TSH 2.05 07/07/2022       Anesthesia Plan    ASA Status:  2   NPO Status:  NPO Appropriate    Anesthesia Type: MAC.     - Reason for MAC: immobility needed   Induction: Intravenous, Propofol.   Maintenance: TIVA.        Consents    Anesthesia Plan(s) and associated risks, benefits, and realistic alternatives discussed. Questions answered and patient/representative(s) expressed understanding.    - Discussed:     - Discussed with:  Patient      - Extended Intubation/Ventilatory Support Discussed: No.      - Patient is DNR/DNI Status: No    Use of blood products discussed: No .     Postoperative Care    Pain management: IV analgesics.   PONV prophylaxis: Ondansetron (or other 5HT-3), Background Propofol Infusion     Comments:           H&P reviewed: Unable to attach H&P to encounter due to EHR limitations. H&P Update: appropriate H&P reviewed, patient examined. No interval changes since H&P (within 30 days).         Rafael Degroot MD

## 2022-10-04 NOTE — DISCHARGE INSTRUCTIONS
Gynecology Outpatient Post-operative Instructions    1.Dressing (if present) may be removed tomorrow.  Leave incision uncovered.    2.You may shower as normal tomorrow.    3. You may eat as tolerated today.    4. You may take ibuprofen over the counter as tolerated.    5. Tomorrow, lifting and physical activity as tolerated.    6.  You may drive when you are no longer requiring narcotic pain medication.    7. You may return to work/school when you feel able.    8.  Please contact my office with any problems.    9.  I would like to touch base in 2 weeks.  Please either send me a [employer] message or call our office and let us know how you are doing.     [facility]   Adult Discharge Orders & Instructions     For 24 hours after surgery    Get plenty of rest.  A responsible adult must stay with you for at least 24 hours after you leave the hospital.   Do not drive or use heavy equipment.  If you have weakness or tingling, don't drive or use heavy equipment until this feeling goes away.  Do not drink alcohol.  Avoid strenuous or risky activities.  Ask for help when climbing stairs.   You may feel lightheaded.  IF so, sit for a few minutes before standing.  Have someone help you get up.   If you have nausea (feel sick to your stomach): Drink only clear liquids such as apple juice, ginger ale, broth or 7-Up.  Rest may also help.  Be sure to drink enough fluids.  Move to a regular diet as you feel able.  You may have a slight fever. Call the doctor if your fever is over 100 F (37.7 C) (taken under the tongue) or lasts longer than 24 hours.  You may have a dry mouth, a sore throat, muscle aches or trouble sleeping.  These should go away after 24 hours.  Do not make important or legal decisions.     Call your doctor for any of the followin.  Signs of infection (fever, growing tenderness at the surgery site, a large amount of drainage or bleeding, severe pain, foul-smelling drainage, redness,  swelling).    2. It has been over 8 to 10 hours since surgery and you are still not able to urinate (pass water).    3.  Headache for over 24 hours.    4.  Signs of Covid-19 infection (temperature over 100 degrees, shortness of breath, cough, loss of taste/smell, generalized body aches, persistent headache,                  chills, sore throat, nausea/vomiting/diarrhea).    Tylenol 975 mg was given at 7:13 AM.  You should not take more then 4,000 mg of tylenol/acetaminophen in a 24 hour period.   Today you received Toradol, an antiinflammatory medication similar to Ibuprofen.  You should not take other antiinflammatory medication, such as Ibuprofen, Motrin, Advil, Aleve, Naprosyn, etc until 1:30 PM.

## 2022-10-04 NOTE — ANESTHESIA CARE TRANSFER NOTE
Patient: Naheed Cee    Procedure: Procedure(s):  Diagnostic hysteroscopy, polypectomy  Endometrial ablation       Diagnosis: Menometrorrhagia [N92.1]  Abnormal uterine bleeding due to endometrial polyp [N93.9, N84.0]  Diagnosis Additional Information: No value filed.    Anesthesia Type:   MAC     Note:      Level of Consciousness: awake  Oxygen Supplementation: room air    Independent Airway: airway patency satisfactory and stable  Dentition: dentition unchanged  Vital Signs Stable: post-procedure vital signs reviewed and stable  Report to RN Given: handoff report given  Patient transferred to: Phase II    Handoff Report: Identifed the Patient, Identified the Reponsible Provider, Reviewed the pertinent medical history, Discussed the surgical course, Reviewed Intra-OP anesthesia mangement and issues during anesthesia, Set expectations for post-procedure period and Allowed opportunity for questions and acknowledgement of understanding      Vitals:  Vitals Value Taken Time   /65    Temp 97.5    Pulse 71    Resp 12    SpO2 97        Electronically Signed By: NARAYAN Patino CRNA  October 4, 2022  8:31 AM

## 2022-10-06 LAB
PATH REPORT.COMMENTS IMP SPEC: NORMAL
PATH REPORT.FINAL DX SPEC: NORMAL
PATH REPORT.GROSS SPEC: NORMAL
PATH REPORT.MICROSCOPIC SPEC OTHER STN: NORMAL
PATH REPORT.RELEVANT HX SPEC: NORMAL
PHOTO IMAGE: NORMAL

## 2022-10-07 ENCOUNTER — MYC MEDICAL ADVICE (OUTPATIENT)
Dept: OBGYN | Facility: CLINIC | Age: 32
End: 2022-10-07

## 2022-10-07 ENCOUNTER — TELEPHONE (OUTPATIENT)
Dept: OBGYN | Facility: CLINIC | Age: 32
End: 2022-10-07

## 2022-10-07 NOTE — TELEPHONE ENCOUNTER
Called and discussed her pathology result.  EIN from hysteroscopic polypectomy.  She has had a salpingectomy and isn't planning future pregnancy.  Reviewed the risks, benefits, and alternatives of hysterectomy including but not limited to bleeding, infection, injury to bowel,bladder and other structures.  The patient is aware that hysterectomy will render her sterile and unable to have further children.  We discussed the different routes of surgery including abdominal, vaginal, and laparoscopic and the possibility that the route of surgery may change during the procedure.  We discussed both total and supracervical hysterectomy and the benefits and contraindications involved.  We discussed ovarian sparing as well as oophrectomy. Reviewed pre and post op course. Patient was given the opportunity to ask questions and have them answered. The patient wants to think it over and will reach out once she has decided how she wants to proceed.    Plan would be for a TOTAL LAPAROSCOPIC HYSTERECTOMY.

## 2022-10-07 NOTE — TELEPHONE ENCOUNTER
[facility] SURGERY PLANNING/SCHEDULING WORKSHEET                                                     Naheed Cee                :  1990  MRN:  [id number]  Home Phone [phone]   Work Phone Not on file.   Mobile [phone]         Surgeon: Jose Dorsey MD    DIAGNOSIS:   Endometrial hyperplasia    SURGICAL PROCEDURE:  TOTAL LAPAROSCOPIC HYSTERECTOMY     Surgery Location:  [facility]  Patient Surgery Class:  SDS  Length of Procedure:  120 minutes  Type of anesthesia:  General    Multi-surgeon case: no  OR Assistant needed:   Yes  Vendor needed: No  Positioning:  See Preference Card  Laterality:  NA  Date requested:  Hoping for end of     Special Equipment: None  Special Instructions for patient:  Not needed  Precautions:  NONE  :  NOT NEEDED    Sterilization consent:  Meets criteria for hysterectomy exemption due to prior BTL.    Preop: Pre-op options: PCP or Rain  Pre-surgery consult needed:  Not applicable.  Postop evaluation needed:  2 weeks    ALLERGIES:   Allergies   Allergen Reactions     Amoxicillin Nausea and Vomiting and Itching      BMI:There is no height or weight on file to calculate BMI.      The proposed surgical procedure is considered HIGH risk.      Jose Dorsey MD    10/7/2022

## 2022-10-11 ENCOUNTER — TELEPHONE (OUTPATIENT)
Dept: OBGYN | Facility: CLINIC | Age: 32
End: 2022-10-11

## 2022-10-11 NOTE — TELEPHONE ENCOUNTER
[facility] SURGERY PLANNING/SCHEDULING WORKSHEET                                                     Naheedcarey Dislaes                :  1990  MRN:  [id number]  Home Phone [phone]   Work Phone Not on file.   Mobile [phone]         Surgeon: Jose Dorsey MD     DIAGNOSIS:   Endometrial hyperplasia     SURGICAL PROCEDURE:  TOTAL LAPAROSCOPIC HYSTERECTOMY      Surgery Location:  [facility]  Patient Surgery Class:  SDS  Length of Procedure:  120 minutes  Type of anesthesia:  General     Multi-surgeon case: no  OR Assistant needed:   Yes  Vendor needed: No  Positioning:  See Preference Card  Laterality:  NA  Date requested:  Hoping for end of      Special Equipment: None  Special Instructions for patient:  Not needed  Precautions:  NONE  :  NOT NEEDED     Sterilization consent:  Meets criteria for hysterectomy exemption due to prior BTL.     Preop: Pre-op options: PCP or Rain  Pre-surgery consult needed:  Not applicable.  Postop evaluation needed:  2 weeks     ALLERGIES:        Allergies   Allergen Reactions     Amoxicillin Nausea and Vomiting and Itching      BMI:There is no height or weight on file to calculate BMI.       The proposed surgical procedure is considered HIGH risk.      Jose Dorsey MD    10/7/2022  SURGERY SCHEDULING AND PRECERTIFICATION    Medical Record Number: [id number]  Naheedcarey Dislaes  YOB: 1990   Phone: [phone] (home)   Primary Provider: Agbeh, Cephas Mawuena    Reason for Admit:  ICD-10 CODE:  N85.00    Surgeon: Jose Dorsey MD  Surgical Procedure: TOTAL LAPAROSCOPIC HYSTERECTOMY     Date of Surgery  Time of Surgery 7:30am  Surgery to be performed at:  [facility]  Status: Outpatient  Type of Anesthesia Anticipated: General    Sterilization consent:  Meets criteria for hysterectomy exemption due to prior BTL..    Pre-Op: On  with Lottie ellis [place]  COVID testing:  Covid testing  is not required because the patient has been vaccinated, is not immunocompromised and does not have Covid symptoms per [facility] guidelines.   Post-Op:  2 weeks on 12/12 with Dr Dorsey at [facility]    Pre-certification routed to [employer]:  Yes    Surgery packet mailed to patient's home address: Yes  Patient instructed NPO 12 hours prior to surgery, arrive 1.5 hours  prior to surgery, must have a .  Patient understood and agrees to the plan.      Requestor:  Reena Briggs     Location:  [place] [phone]

## 2022-10-16 NOTE — TELEPHONE ENCOUNTER
PB DOS: 11/25/2022  Type of Procedure: TOTAL LAPAROSCOPIC HYSTERECTOMY   CPT Codes: 04329, 22146  ICD10 Codes: N85.00  Surgeon/Ordering provider: Dr. Dorsey  Pre-cert/Authorization completed:  No PA Required  Payer: [facility]  Spoke to [employer] portal and [facility] medical coverage criteria policy  Ref. #/ Auth #   Valid Dates:     Surgery form and last office visit note faxed to [facility].

## 2022-11-11 NOTE — PROGRESS NOTES
[facility]  [address] [facility] [address]  Phone: [phone]  Primary Provider: Agbeh, Cephas Mawuena  Pre-op Performing Provider: NED STALLINGS      PREOPERATIVE EVALUATION:  Today's date: 11/18/2022    Naheed Cee is a 32 year old female who presents for a preoperative evaluation.    Surgical Information:  Surgery/Procedure: Total Laparoscopic Hysterectomy  Surgery Location: [facility]  Surgeon: Jose Dorsey  Surgery Date: 11/25/2022  Time of Surgery: 7:30am   Where patient plans to recover: At home with family  Fax number for surgical facility: Note does not need to be faxed, will be available electronically in Epic.    Type of Anesthesia Anticipated: General    Assessment & Plan     The proposed surgical procedure is considered INTERMEDIATE risk.    Preop general physical exam    - Hemoglobin; Future  - Hemoglobin A1c; Future  - HCG qualitative, Blood ([id number]); Future    Menometrorrhagia      Morbid obesity (H)      Abnormal uterine bleeding due to endometrial polyp               Risks and Recommendations:  The patient has the following additional risks and recommendations for perioperative complications:   - Consult Hospitalist / IM to assist with post-op medical management   - Morbid obesity (BMI >40)    Medication Instructions:  Patient is on no chronic medications    RECOMMENDATION:  Assuming labs reasonable, APPROVAL GIVEN to proceed with proposed procedure, without further diagnostic evaluation.      Subjective     HPI related to upcoming procedure:  surg path report, needs hysterectomy: Endometrium, endometrial polyp, polypectomy:  - Endometrial intraepithelial neoplasia (EIN, endometrial atypical hyperplasia)  - Fragments of endometrial polyp(s)         Preop Questions 11/11/2022   1. Have you ever had a heart attack or stroke? No   2. Have you ever had surgery on your heart or blood vessels, such as a stent placement, a coronary artery  bypass, or surgery on an artery in your head, neck, heart, or legs? No   3. Do you have chest pain with activity? No   4. Do you have a history of  heart failure? No   5. Do you currently have a cold, bronchitis or symptoms of other infection? No   6. Do you have a cough, shortness of breath, or wheezing? No   7. Do you or anyone in your family have previous history of blood clots? No   8. Do you or does anyone in your family have a serious bleeding problem such as prolonged bleeding following surgeries or cuts? No   9. Have you ever had problems with anemia or been told to take iron pills? YES -    10. Have you had any abnormal blood loss such as black, tarry or bloody stools, or abnormal vaginal bleeding? No   11. Have you ever had a blood transfusion? No   12. Are you willing to have a blood transfusion if it is medically needed before, during, or after your surgery? Yes   13. Have you or any of your relatives ever had problems with anesthesia? No   14. Do you have sleep apnea, excessive snoring or daytime drowsiness? No   15. Do you have any artifical heart valves or other implanted medical devices like a pacemaker, defibrillator, or continuous glucose monitor? No   16. Do you have artificial joints? No   17. Are you allergic to latex? No   18. Is there any chance that you may be pregnant? No       Health Care Directive:  Patient does not have a Health Care Directive or Living Will: Discussed advance care planning with patient; however, patient declined at this time.    Preoperative Review of :   reviewed - controlled substances prescribed by other outside provider(s).      Status of Chronic Conditions:  See problem list for active medical problems.  Problems all longstanding and stable, except as noted/documented.  See ROS for pertinent symptoms related to these conditions.      Review of Systems  Constitutional, neuro, ENT, endocrine, pulmonary, cardiac, gastrointestinal, genitourinary, musculoskeletal,  integument and psychiatric systems are negative, except as otherwise noted.    Patient Active Problem List    Diagnosis Date Noted     Morbid obesity (H) 2022     Priority: Medium     Menometrorrhagia 2022     Priority: Medium     Added automatically from request for surgery [id number]       Abnormal uterine bleeding due to endometrial polyp 08/15/2022     Priority: Medium     Bilateral carpal tunnel syndrome 2018     Priority: Medium     S/P  section 12/15/2017     Priority: Medium      premature rupture of membranes (PPROM) delivered, current hospitalization 2017     Priority: Medium     Sterilization consult 2017     Priority: Medium     Severe dysplasia of cervix 2013     Priority: Medium     Tobacco use disorder      Priority: Medium     Severe dysplasia of cervix (PAUL III) 2012     Priority: Medium     ASCUS Pap, + HPV at previous clinic.   12 ASCUS, + HR HPV 16, 53 & 59. Plan colp-  13 [place] PAUL 2 & 3. Plan LEEP  13 LEEP PAUL 3. Repeat pap 3 months.    13 NIL. Repeat pap 6 months.    Gap in care  17 NIL Pap, neg HPV. Plan: cotest in 1 year   Patient is lost to pap tracking follow-up  21 NIL pap, Neg HPV. Plan: cotest in 1 year  22 NIL pap, neg HPV. Plan: cotest every 3 years          Past Medical History:   Diagnosis Date     Anemia      ASCUS with positive high risk HPV 2012    types 16, 53 & 59     H/O colposcopy with cervical biopsy 2013    PAUL 2 & 3     Tobacco use disorder      Past Surgical History:   Procedure Laterality Date      SECTION N/A 12/15/2017    Procedure:  SECTION;;  Surgeon: Larissa Doyle MD;  [facility]     DILATION AND CURETTAGE, HYSTEROSCOPY, ABLATE ENDOMETRIUM NOVASURE, COMBINED N/A 10/4/2022    Procedure: Endometrial ablation;  Surgeon: Jose Dorsey MD;  Location: [facility] OR     LEEP TX, CERVICAL  2013    PAUL 3     OPERATIVE HYSTEROSCOPY WITH MORCELLATOR N/A  "10/4/2022    Procedure: Diagnostic hysteroscopy, polypectomy;  Surgeon: Jose Dorsey MD;  Location: [facility] OR     SALPINGECTOMY      Done with C/Section 2017     Current Outpatient Medications   Medication Sig Dispense Refill     oxyCODONE (ROXICODONE) 5 MG tablet Take 1-2 tablets (5-10 mg) by mouth every 4 hours as needed for moderate to severe pain 6 tablet 0       Allergies   Allergen Reactions     Amoxicillin Nausea and Vomiting and Itching        Social History     Tobacco Use     Smoking status: Former     Packs/day: 0.50     Years: 14.00     Pack years: 7.00     Types: Cigarettes     Quit date: 2017     Years since quittin.9     Smokeless tobacco: Never   Substance Use Topics     Alcohol use: No     Family History   Problem Relation Age of Onset     Hypertension Father      Heart Disease Maternal Grandmother      Heart Disease Maternal Grandfather      Breast Cancer Paternal Grandmother      History   Drug Use No         Objective     /84   Pulse 82   Temp 97.4  F (36.3  C) (Tympanic)   Resp 18   Ht 1.727 m (5' 8\")   Wt 130.2 kg (287 lb)   LMP 2022 (Approximate)   SpO2 98%   BMI 43.64 kg/m      Physical Exam    GENERAL APPEARANCE: alert and active     EYES: EOMI, PERRL     HENT: ear canals and TM's normal and nose and mouth without ulcers or lesions     NECK: no adenopathy, no asymmetry, masses, or scars and thyroid normal to palpation     RESP: lungs clear to auscultation - no rales, rhonchi or wheezes     CV: regular rates and rhythm, normal S1 S2, no S3 or S4 and no murmur, click or rub     MS: extremities normal- no gross deformities noted, no evidence of inflammation in joints, FROM in all extremities.     SKIN: no suspicious lesions or rashes     NEURO: Normal strength and tone, sensory exam grossly normal, mentation intact and speech normal     PSYCH: mentation appears normal. and affect normal/bright     LYMPHATICS: No cervical adenopathy    Recent Labs   Lab Test " 09/27/22  1635 07/07/22  1104 06/16/21  0931   HGB 12.7 14.0 10.4*   PLT  --   --  380        Diagnostics:  Labs pending at this time.  Results will be reviewed when available.   No EKG required, no history of coronary heart disease, significant arrhythmia, peripheral arterial disease or other structural heart disease.    Revised Cardiac Risk Index (RCRI):  The patient has the following serious cardiovascular risks for perioperative complications:   - No serious cardiac risks = 0 points     RCRI Interpretation: 0 points: Class I (very low risk - 0.4% complication rate)           Signed Electronically by: Lottie Cunha PA-C  Copy of this evaluation report is provided to requesting physician.

## 2022-11-11 NOTE — PATIENT INSTRUCTIONS
Preparing for Your Surgery  Getting started  A nurse will call you to review your health history and instructions. They will give you an arrival time based on your scheduled surgery time. Please be ready to share:    Your doctor s clinic name and phone number    Your medical, surgical, and anesthesia history    A list of allergies and sensitivities    A list of medicines, including herbal treatments and over-the-counter drugs    Whether the patient has a legal guardian (ask how to send us the papers in advance)  Please tell us if you re pregnant--or if there s any chance you might be pregnant. Some surgeries may injure a fetus (unborn baby), so they require a pregnancy test. Surgeries that are safe for a fetus don t always need a test, and you can choose whether to have one.   If you have a child who s having surgery, please ask for a copy of Preparing for Your Child s Surgery.    Preparing for surgery    Within 10 to 30 days of surgery: Have a pre-op exam (sometimes called an H&P, or History and Physical). This can be done at a clinic or pre-operative center.  ? If you re having a , you may not need this exam. Talk to your care team.    At your pre-op exam, talk to your care team about all medicines you take. If you need to stop any medicines before surgery, ask when to start taking them again.  ? We do this for your safety. Many medicines can make you bleed too much during surgery. Some change how well surgery (anesthesia) drugs work.    Call your insurance company to let them know you re having surgery. (If you don t have insurance, call [phone].)    Call your clinic if there s any change in your health. This includes signs of a cold or flu (sore throat, runny nose, cough, rash, fever). It also includes a scrape or scratch near the surgery site.    If you have questions on the day of surgery, call your hospital or surgery center.  COVID testing  You may need to be tested for COVID-19 before having  surgery. If so, we will give you instructions (or click here).  Eating and drinking guidelines  For your safety: Unless your surgeon tells you otherwise, follow the guidelines below.    Eat and drink as usual until 8 hours before you arrive for surgery. After that, no food or milk.    Drink clear liquids until 2 hours before you arrive. These are liquids you can see through, like water, Gatorade, and Propel Water. They also include plain black coffee and tea (no cream or milk), candy, and breath mints. You can spit out gum when you arrive.    If you drink alcohol: Stop drinking it the night before surgery.    If your care team tells you to take medicine on the morning of surgery, it s okay to take it with a sip of water.  Preventing infection    Shower or bathe the night before and morning of your surgery. Follow the instructions your clinic gave you. (If no instructions, use regular soap.)    Don t shave or clip hair near your surgery site. We ll remove the hair if needed.    Don t smoke or vape the morning of surgery. You may chew nicotine gum up to 2 hours before surgery. A nicotine patch is okay.  ? Note: Some surgeries require you to completely quit smoking and nicotine. Check with your surgeon.    Your care team will make every effort to keep you safe from infection. We will:  ? Clean our hands often with soap and water (or an alcohol-based hand rub).  ? Clean the skin at your surgery site with a special soap that kills germs.  ? Give you a special gown to keep you warm. (Cold raises the risk of infection.)  ? Wear special hair covers, masks, gowns and gloves during surgery.  ? Give antibiotic medicine, if prescribed. Not all surgeries need antibiotics.  What to bring on the day of surgery    Photo ID and insurance card    Copy of your health care directive, if you have one    Glasses and hearing aids (bring cases)  ? You can t wear contacts during surgery    Inhaler and eye drops, if you use them (tell us  about these when you arrive)    CPAP machine or breathing device, if you use them    A few personal items, if spending the night    If you have . . .  ? A pacemaker, ICD (cardiac defibrillator) or other implant: Bring the ID card.  ? An implanted stimulator: Bring the remote control.  ? A legal guardian: Bring a copy of the certified (court-stamped) guardianship papers.  Please remove any jewelry, including body piercings. Leave jewelry and other valuables at home.  If you re going home the day of surgery    You must have a responsible adult drive you home. They should stay with you overnight as well.    If you don t have someone to stay with you, and you aren t safe to go home alone, we may keep you overnight. Insurance often won t pay for this.  After surgery  If it s hard to control your pain or you need more pain medicine, please call your surgeon s office.  Questions?   If you have any questions for your care team, list them here:   ____________________________________________________________________________________________________________________________________________________________________________________________________________________________________________________________________  For informational purposes only. Not to replace the advice of your health care provider. Copyright   2003, 2019 [employer]. All rights reserved. Clinically reviewed by Eun Crawford MD. Chug [id number] - REV 10/22.

## 2022-11-18 ENCOUNTER — OFFICE VISIT (OUTPATIENT)
Dept: FAMILY MEDICINE | Facility: CLINIC | Age: 32
End: 2022-11-18
Payer: COMMERCIAL

## 2022-11-18 VITALS
DIASTOLIC BLOOD PRESSURE: 84 MMHG | RESPIRATION RATE: 18 BRPM | WEIGHT: 287 LBS | HEIGHT: 68 IN | TEMPERATURE: 97.4 F | OXYGEN SATURATION: 98 % | BODY MASS INDEX: 43.5 KG/M2 | SYSTOLIC BLOOD PRESSURE: 121 MMHG | HEART RATE: 82 BPM

## 2022-11-18 DIAGNOSIS — Z01.818 PREOP GENERAL PHYSICAL EXAM: Primary | ICD-10-CM

## 2022-11-18 DIAGNOSIS — N93.9 ABNORMAL UTERINE BLEEDING DUE TO ENDOMETRIAL POLYP: ICD-10-CM

## 2022-11-18 DIAGNOSIS — N92.1 MENOMETRORRHAGIA: ICD-10-CM

## 2022-11-18 DIAGNOSIS — E66.01 MORBID OBESITY (H): ICD-10-CM

## 2022-11-18 DIAGNOSIS — N84.0 ABNORMAL UTERINE BLEEDING DUE TO ENDOMETRIAL POLYP: ICD-10-CM

## 2022-11-18 LAB
HBA1C MFR BLD: 5.7 % (ref 0–5.6)
HCG SERPL QL: NEGATIVE
HGB BLD-MCNC: 13.3 G/DL (ref 11.7–15.7)

## 2022-11-18 PROCEDURE — 85018 HEMOGLOBIN: CPT | Performed by: PHYSICIAN ASSISTANT

## 2022-11-18 PROCEDURE — 99214 OFFICE O/P EST MOD 30 MIN: CPT | Performed by: PHYSICIAN ASSISTANT

## 2022-11-18 PROCEDURE — 83036 HEMOGLOBIN GLYCOSYLATED A1C: CPT | Performed by: PHYSICIAN ASSISTANT

## 2022-11-18 PROCEDURE — 84703 CHORIONIC GONADOTROPIN ASSAY: CPT | Performed by: PHYSICIAN ASSISTANT

## 2022-11-18 PROCEDURE — 36415 COLL VENOUS BLD VENIPUNCTURE: CPT | Performed by: PHYSICIAN ASSISTANT

## 2022-11-18 ASSESSMENT — PAIN SCALES - GENERAL: PAINLEVEL: NO PAIN (0)

## 2022-11-18 NOTE — RESULT ENCOUNTER NOTE
Hetal Farfan,       Your recent test results are attached, if you have any questions or concerns please feel free to contact me via e-mail or call [phone].  Negative pregnancy. A1c shows pre-diabetes working on weight loss will help that. I would recheck a1c 6 months.   Hemoglobin normal no anemia.      It was a pleasure to see you at your recent office visit.      Sincerely,  Lottie Cunha PA-C

## 2022-12-12 ENCOUNTER — OFFICE VISIT (OUTPATIENT)
Dept: OBGYN | Facility: CLINIC | Age: 32
End: 2022-12-12
Payer: COMMERCIAL

## 2022-12-12 VITALS
HEART RATE: 81 BPM | BODY MASS INDEX: 43.49 KG/M2 | SYSTOLIC BLOOD PRESSURE: 121 MMHG | DIASTOLIC BLOOD PRESSURE: 81 MMHG | OXYGEN SATURATION: 96 % | WEIGHT: 286 LBS

## 2022-12-12 DIAGNOSIS — Z98.890 POST-OPERATIVE STATE: Primary | ICD-10-CM

## 2022-12-12 PROCEDURE — 99024 POSTOP FOLLOW-UP VISIT: CPT | Performed by: OBSTETRICS & GYNECOLOGY

## 2022-12-12 NOTE — PATIENT INSTRUCTIONS
If you have any questions regarding your visit, Please contact your care team.     [employer] Services: [phone]    To Schedule an Appointment 24/7  Call: [phone] [place] HOURS TELEPHONE NUMBER     Jose Dorsey MD  Medical Director    Eldon Caro-RONEN Zavala-Surgery Scheduler  Rosaura-Surgery Scheduler               Tuesday-[place]  7:30 a.m-4:30 p.m    Thursday-[place]  7:30 a.m-4:30 p.m    Typical Surgery Days: Tuesday or Friday [facility]  [address] [place] [address]  PH: [phone]     Imaging Scheduling all locations  PH: [phone]     [facility] Labor and Delivery  [address] [place] [address] [place] [address]  PH: [phone]    [facility]  [address] [place] [address]  PH: [phone] Fax      **Surgeries** Our Surgery Schedulers will contact you to schedule. If you do not receive a call within 3 business days, please call [phone].    Urgent Care locations:  [facility] Monday-Friday  10 am - 8 pm  Saturday and Sunday   9 am - 5 pm  Monday-Friday   10 am - 8 pm  Saturday and Sunday   9 am - 5 pm   [phone]   If you need a medication refill, please contact your pharmacy. Please allow 3 business days for your refill to be completed.  As always, Thank you for trusting us with your healthcare needs!    see additional instructions from your care team below

## 2022-12-12 NOTE — PROGRESS NOTES
Naheed is a 32 year old  2 weeks s/p  TOTAL LAPAROSCOPIC HYSTERECTOMY complicated by no problems reported.  She is currently requiring nothing for pain management.  - vaginal bleeding, - hot flashes.  - GI/ complaints.  Energy level is Medium.  Denies fever.  Reviewed the pathology results Uterus and cervix, hysterectomy:  1. Extensive necrosis of endometrium and superficial myometrium  Focal viable proliferative endometrium  PE: /81 (BP Location: Left arm, Patient Position: Sitting, Cuff Size: Adult Large)   Pulse 81   Wt 129.7 kg (286 lb)   LMP 2022 (Approximate)   SpO2 96%   BMI 43.49 kg/m    Abd: soft, non tender, no masses  Incision: intact, no erythema, induration or discharge  vaginal cuff intact and non tender  Ext. Genitalia: Normal  Vagina:cuff healing, no lesions, Normal mucosa, no discharge  BME: no tenderness or mass    A/P 2 weeks s/p surgery, doing well     1. Pelvic rest for 4 more weeks  Follow up as needed  Jose Dorsey MD
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
## 2023-06-07 ENCOUNTER — PATIENT OUTREACH (OUTPATIENT)
Dept: CARE COORDINATION | Facility: CLINIC | Age: 33
End: 2023-06-07
Payer: COMMERCIAL

## 2023-06-07 ENCOUNTER — MYC MEDICAL ADVICE (OUTPATIENT)
Dept: OBGYN | Facility: CLINIC | Age: 33
End: 2023-06-07
Payer: COMMERCIAL

## 2023-06-21 ENCOUNTER — PATIENT OUTREACH (OUTPATIENT)
Dept: CARE COORDINATION | Facility: CLINIC | Age: 33
End: 2023-06-21
Payer: COMMERCIAL

## 2023-09-24 ENCOUNTER — HEALTH MAINTENANCE LETTER (OUTPATIENT)
Age: 33
End: 2023-09-24

## 2023-12-16 NOTE — PLAN OF CARE
Problem: PROM, PPROM, Prolonged Rupture of Membranes (Adult,Obstetrics,Pediatric)  Goal: Signs and Symptoms of Listed Potential Problems Will be Absent, Minimized or Managed (PROM, PPROM, Prolonged Rupture of Membranes)  Signs and symptoms of listed potential problems will be absent, minimized or managed by discharge/transition of care (reference PROM, PPROM, Prolonged Rupture of Membranes (Adult,Obstetrics,Pediatric) CPG).   Outcome: Improving  Pt stable, VS WDL. Pt denies: bleeding, pain, contractions. Leaking scant amounts of clear fluid. FHR appropriate for gestational age.       Alert and oriented to person, place and time

## 2023-12-28 ASSESSMENT — ENCOUNTER SYMPTOMS
FEVER: 0
DIZZINESS: 0
DIARRHEA: 0
NAUSEA: 0
JOINT SWELLING: 0
HEMATURIA: 0
PALPITATIONS: 0
SHORTNESS OF BREATH: 0
COUGH: 0
DYSURIA: 0
ABDOMINAL PAIN: 0
WEAKNESS: 0
MYALGIAS: 0
HEARTBURN: 0
HEMATOCHEZIA: 0
SORE THROAT: 0
CHILLS: 0
CONSTIPATION: 0
PARESTHESIAS: 0
EYE PAIN: 0
FREQUENCY: 0
HEADACHES: 0
NERVOUS/ANXIOUS: 0
BREAST MASS: 0
ARTHRALGIAS: 0

## 2023-12-29 ENCOUNTER — OFFICE VISIT (OUTPATIENT)
Dept: OBGYN | Facility: CLINIC | Age: 33
End: 2023-12-29
Payer: COMMERCIAL

## 2023-12-29 VITALS
DIASTOLIC BLOOD PRESSURE: 84 MMHG | HEIGHT: 68 IN | HEART RATE: 76 BPM | OXYGEN SATURATION: 96 % | SYSTOLIC BLOOD PRESSURE: 134 MMHG | BODY MASS INDEX: 43.83 KG/M2 | WEIGHT: 289.2 LBS

## 2023-12-29 DIAGNOSIS — Z01.411 ENCOUNTER FOR GYNECOLOGICAL EXAMINATION WITH ABNORMAL FINDING: Primary | ICD-10-CM

## 2023-12-29 DIAGNOSIS — Z13.6 CARDIOVASCULAR SCREENING; LDL GOAL LESS THAN 130: ICD-10-CM

## 2023-12-29 DIAGNOSIS — L29.2 VULVAR PRURITUS: ICD-10-CM

## 2023-12-29 DIAGNOSIS — Z13.1 SCREENING FOR DIABETES MELLITUS: ICD-10-CM

## 2023-12-29 LAB
CLUE CELLS: NORMAL
TRICHOMONAS, WET PREP: NORMAL
WBC'S/HIGH POWER FIELD, WET PREP: NORMAL
YEAST, WET PREP: NORMAL

## 2023-12-29 PROCEDURE — 99395 PREV VISIT EST AGE 18-39: CPT | Performed by: NURSE PRACTITIONER

## 2023-12-29 PROCEDURE — 87210 SMEAR WET MOUNT SALINE/INK: CPT | Performed by: NURSE PRACTITIONER

## 2023-12-29 RX ORDER — CLOBETASOL PROPIONATE 0.5 MG/G
OINTMENT TOPICAL 2 TIMES DAILY
Qty: 45 G | Refills: 0 | Status: SHIPPED | OUTPATIENT
Start: 2023-12-29

## 2023-12-29 ASSESSMENT — ENCOUNTER SYMPTOMS
HEMATOCHEZIA: 0
MYALGIAS: 0
ARTHRALGIAS: 0
HEADACHES: 0
BREAST MASS: 0
DYSURIA: 0
COUGH: 0
CONSTIPATION: 0
JOINT SWELLING: 0
SHORTNESS OF BREATH: 0
NAUSEA: 0
FREQUENCY: 0
NERVOUS/ANXIOUS: 0
CHILLS: 0
HEMATURIA: 0
WEAKNESS: 0
PARESTHESIAS: 0
DIZZINESS: 0
HEARTBURN: 0
PALPITATIONS: 0
FEVER: 0
SORE THROAT: 0
EYE PAIN: 0
DIARRHEA: 0
ABDOMINAL PAIN: 0

## 2023-12-29 NOTE — PATIENT INSTRUCTIONS
If you have any questions regarding your visit, Please contact your care team.     [employer] Services: [phone]  To Schedule an Appointment 24/7  Call: [phone] HOURS TELEPHONE NUMBER     Rain Yip- APRN CNP      Roldan Zavala-Surgery Scheduler  Rosaura-Surgery Scheduler         Monday 7:30am-2:00pm    Tuesday 7:30am-4:00pm    Wednesday 7:30am-2:00pm    Thursday 7:30am-11:00am    Friday 7:30am-2:00pm [address] [address]  Phone- [phone]   Fax- [phone]     Imaging Scheduling all locations  [phone]    [facility] Labor and Delivery  [address] [place] [address]   [place] [address] [phone]         Urgent Care locations:  [facility]   Monday-Friday  10 am - 8 pm  Saturday and Sunday   9 am - 5 pm     [phone]   If you need a medication refill, please contact your pharmacy. Please allow 3 business days for your refill to be completed.  As always, Thank you for trusting us with your healthcare needs!      see additional instructions from your care team below

## 2023-12-29 NOTE — PROGRESS NOTES
SUBJECTIVE:   Naheed is a 33 year old, presenting for the following:  Physical      Healthy Habits:     Getting at least 3 servings of Calcium per day:  Yes    Bi-annual eye exam:  Yes    Dental care twice a year:  Yes    Sleep apnea or symptoms of sleep apnea:  None    Diet:  Regular (no restrictions)    Frequency of exercise:  None    Taking medications regularly:  Yes    Medication side effects:  Not applicable    Additional concerns today:  Yes    Patient has been noting vulvar itching for some time. Mostly external, can come and go as far as severity. Occasional vaginal odor, no abnormal vaginal discharge. No urinary symptoms.     Additionally, noting intermittent breast pain. Can occur on either breast-not at the same time. Occurs maybe about once a month and notes a shooting pain that can last 30-60 minutes and then subsides. No increase in caffeine, former smoker, feels she has good supportive bras. Hysterectomy about a year ago, so no cycles, but does not believe this is hormonal.     Today's PHQ-2 Score:       2023     1:54 PM   PHQ-2 (  [employer])   Q1: Little interest or pleasure in doing things 0   Q2: Feeling down, depressed or hopeless 0   PHQ-2 Score 0   Q1: Little interest or pleasure in doing things Not at all   Q2: Feeling down, depressed or hopeless Not at all   PHQ-2 Score 0       Social History     Tobacco Use    Smoking status: Former     Packs/day: 0.50     Years: 14.00     Additional pack years: 0.00     Total pack years: 7.00     Types: Cigarettes     Quit date: 2017     Years since quittin.1    Smokeless tobacco: Never   Substance Use Topics    Alcohol use: No         2023     1:53 PM   Alcohol Use   Prescreen: >3 drinks/day or >7 drinks/week? No       Reviewed orders with patient.  Reviewed health maintenance and updated orders accordingly - Yes  Patient Active Problem List   Diagnosis    Severe dysplasia of cervix (PAUL III)    Tobacco use disorder    Severe  dysplasia of cervix    Sterilization consult     premature rupture of membranes (PPROM) delivered, current hospitalization    S/P  section    Bilateral carpal tunnel syndrome    Abnormal uterine bleeding due to endometrial polyp    Menometrorrhagia    Morbid obesity (H)     Past Surgical History:   Procedure Laterality Date     SECTION N/A 12/15/2017    Procedure:  SECTION;;  Surgeon: Larissa Doyle MD;  [facility]: [facility]    DILATION AND CURETTAGE, HYSTEROSCOPY, ABLATE ENDOMETRIUM NOVASURE, COMBINED N/A 10/04/2022    Procedure: Endometrial ablation;  Surgeon: Jose Dorsey MD;  Location: [facility] OR    LAPAROSCOPIC HYSTERECTOMY  2022    LEEP TX, CERVICAL  2013    PAUL 3    OPERATIVE HYSTEROSCOPY WITH MORCELLATOR N/A 10/04/2022    Procedure: Diagnostic hysteroscopy, polypectomy;  Surgeon: Jose Dorsey MD;  Location: [facility] OR    SALPINGECTOMY      Done with C/Section 2017       Social History     Tobacco Use    Smoking status: Former     Packs/day: 0.50     Years: 14.00     Additional pack years: 0.00     Total pack years: 7.00     Types: Cigarettes     Quit date: 2017     Years since quittin.1    Smokeless tobacco: Never   Substance Use Topics    Alcohol use: No     Family History   Problem Relation Age of Onset    Hypertension Father     Heart Disease Maternal Grandmother     Heart Disease Maternal Grandfather     Breast Cancer Paternal Grandmother            Breast Cancer Screenin/24/2022     8:15 AM 2022     9:30 AM   Breast CA Risk Assessment (FHS-7)   Do you have a family history of breast, colon, or ovarian cancer? Yes No / Unknown       Patient under 40 years of age: Routine Mammogram Screening not recommended.   Pertinent mammograms are reviewed under the imaging tab.    History of abnormal Pap smear: YES - updated in Problem List and Health Maintenance accordingly      Latest Ref Rng & Units 2022    10:46 AM 2021     9:14 AM  2021     9:11 AM   PAP / HPV   PAP  Negative for Intraepithelial Lesion or Malignancy (NILM)      PAP (Historical)    NIL    HPV 16 DNA Negative Negative  Negative     HPV 18 DNA Negative Negative  Negative     Other HR HPV Negative Negative  Negative       Reviewed and updated as needed this visit by clinical staff   Tobacco  Allergies  Meds  Problems  Med Hx  Surg Hx  Fam Hx  Soc   Hx        Reviewed and updated as needed this visit by Provider   Tobacco  Allergies  Meds  Problems  Med Hx  Surg Hx  Fam Hx  Soc   Hx       Past Medical History:   Diagnosis Date    Anemia     ASCUS with positive high risk HPV 2012    types 16, 53 & 59    H/O colposcopy with cervical biopsy 2013    PAUL 2 & 3    Tobacco use disorder       Past Surgical History:   Procedure Laterality Date     SECTION N/A 12/15/2017    Procedure:  SECTION;;  Surgeon: Larissa Doyle MD;  [facility]    DILATION AND CURETTAGE, HYSTEROSCOPY, ABLATE ENDOMETRIUM NOVASURE, COMBINED N/A 10/04/2022    Procedure: Endometrial ablation;  Surgeon: Jose Dorsey MD;  Location: [facility]    LAPAROSCOPIC HYSTERECTOMY  2022    LEEP TX, CERVICAL  2013    PAUL 3    OPERATIVE HYSTEROSCOPY WITH MORCELLATOR N/A 10/04/2022    Procedure: Diagnostic hysteroscopy, polypectomy;  Surgeon: Jose Dorsey MD;  Location: [facility]    SALPINGECTOMY      Done with C/Section 2017       Review of Systems   Constitutional:  Negative for chills and fever.   HENT:  Negative for congestion, ear pain, hearing loss and sore throat.    Eyes:  Negative for pain and visual disturbance.   Respiratory:  Negative for cough and shortness of breath.    Cardiovascular:  Negative for chest pain, palpitations and peripheral edema.   Gastrointestinal:  Negative for abdominal pain, constipation, diarrhea, heartburn, hematochezia and nausea.   Breasts:  Positive for tenderness. Negative for breast mass and discharge.   Genitourinary:  Negative  "for dysuria, frequency, genital sores, hematuria, pelvic pain, urgency, vaginal bleeding and vaginal discharge.   Musculoskeletal:  Negative for arthralgias, joint swelling and myalgias.   Skin:  Negative for rash.   Neurological:  Negative for dizziness, weakness, headaches and paresthesias.   Psychiatric/Behavioral:  Negative for mood changes. The patient is not nervous/anxious.    See above for vaginal concern     OBJECTIVE:   /84 (BP Location: Right arm, Patient Position: Sitting, Cuff Size: Adult Large)   Pulse 76   Ht 1.727 m (5' 8\")   Wt 131.2 kg (289 lb 3.2 oz)   LMP 11/05/2022 (Approximate)   SpO2 96%   BMI 43.97 kg/m    Physical Exam  GENERAL: healthy, alert and no distress  NECK: no adenopathy, no asymmetry, masses, or scars and thyroid normal to palpation  RESP: lungs clear to auscultation - no rales, rhonchi or wheezes  BREAST: normal without masses, tenderness or nipple discharge and no palpable axillary masses or adenopathy  CV: regular rate and rhythm, normal S1 S2, no S3 or S4, no murmur, click or rub, no peripheral edema and peripheral pulses strong  ABDOMEN: soft, nontender, no hepatosplenomegaly, no masses and bowel sounds normal   (female): on the vulva, inside of the labia are areas of darker erythematous patches, R>L, these correlate to the areas of itching, normal urethral meatus, vaginal mucosa pink, moist, well rugated, and normal post-hysterectomy exam without masses.   MS: no gross musculoskeletal defects noted, no edema  SKIN: no suspicious lesions or rashes  NEURO: Normal strength and tone, mentation intact and speech normal  PSYCH: mentation appears normal, affect normal/bright    Diagnostic Test Results:  Labs reviewed in Epic  Results for orders placed or performed in visit on 12/29/23 (from the past 24 hour(s))   Wet preparation    Specimen: Vagina; Swab   Result Value Ref Range    Trichomonas Absent Absent    Yeast Absent Absent    Clue Cells Absent Absent    " WBCs/high power field None None       ASSESSMENT/PLAN:   (Z01.411) Encounter for gynecological examination with abnormal finding  (primary encounter diagnosis)  Comment: Health Maintenance reviewed. Will monitor and try to track breast tenderness. If symptoms become more frequent, last longer, or change in other ways, will send me a message and we will plan breast imaging.    (L29.2) Vulvar pruritus  Comment: Wet prep negative, discussed possible etiologies of her symptoms. Trial of Temovate per instructions and patient will plan follow up with GYN Physician after 4 weeks of use if no improvement of symptoms.   Plan: Wet preparation, clobetasol (TEMOVATE) 0.05 %         external ointment         (Z13.6) CARDIOVASCULAR SCREENING; LDL GOAL LESS THAN 130  Plan: Lipid panel reflex to direct LDL Fasting     (Z13.1) Screening for diabetes mellitus  Plan: Glucose         COUNSELING:  Reviewed preventive health counseling, as reflected in patient instructions  Special attention given to:        Regular exercise       Healthy diet/nutrition        She reports that she quit smoking about 6 years ago. Her smoking use included cigarettes. She has a 7 pack-year smoking history. She has never used smokeless tobacco.          NARAYAN Rebolledo CNP  [facility]

## 2024-02-13 ENCOUNTER — LAB (OUTPATIENT)
Dept: LAB | Facility: CLINIC | Age: 34
End: 2024-02-13
Payer: COMMERCIAL

## 2024-02-13 DIAGNOSIS — Z13.1 SCREENING FOR DIABETES MELLITUS: ICD-10-CM

## 2024-02-13 DIAGNOSIS — Z13.6 CARDIOVASCULAR SCREENING; LDL GOAL LESS THAN 130: ICD-10-CM

## 2024-02-13 LAB
CHOLEST SERPL-MCNC: 199 MG/DL
FASTING STATUS PATIENT QL REPORTED: YES
FASTING STATUS PATIENT QL REPORTED: YES
GLUCOSE SERPL-MCNC: 94 MG/DL (ref 70–99)
HDLC SERPL-MCNC: 57 MG/DL
LDLC SERPL CALC-MCNC: 130 MG/DL
NONHDLC SERPL-MCNC: 142 MG/DL
TRIGL SERPL-MCNC: 61 MG/DL

## 2024-02-13 PROCEDURE — 80061 LIPID PANEL: CPT

## 2024-02-13 PROCEDURE — 36415 COLL VENOUS BLD VENIPUNCTURE: CPT

## 2024-02-13 PROCEDURE — 82947 ASSAY GLUCOSE BLOOD QUANT: CPT

## 2024-11-13 SDOH — HEALTH STABILITY: PHYSICAL HEALTH: ON AVERAGE, HOW MANY MINUTES DO YOU ENGAGE IN EXERCISE AT THIS LEVEL?: 10 MIN

## 2024-11-13 SDOH — HEALTH STABILITY: PHYSICAL HEALTH: ON AVERAGE, HOW MANY DAYS PER WEEK DO YOU ENGAGE IN MODERATE TO STRENUOUS EXERCISE (LIKE A BRISK WALK)?: 1 DAY

## 2024-11-13 ASSESSMENT — SOCIAL DETERMINANTS OF HEALTH (SDOH): HOW OFTEN DO YOU GET TOGETHER WITH FRIENDS OR RELATIVES?: MORE THAN THREE TIMES A WEEK

## 2024-11-18 ENCOUNTER — OFFICE VISIT (OUTPATIENT)
Dept: FAMILY MEDICINE | Facility: CLINIC | Age: 34
End: 2024-11-18
Payer: COMMERCIAL

## 2024-11-18 VITALS
BODY MASS INDEX: 42.48 KG/M2 | SYSTOLIC BLOOD PRESSURE: 120 MMHG | HEART RATE: 83 BPM | RESPIRATION RATE: 20 BRPM | DIASTOLIC BLOOD PRESSURE: 83 MMHG | HEIGHT: 69 IN | WEIGHT: 286.8 LBS | TEMPERATURE: 98.1 F | OXYGEN SATURATION: 98 %

## 2024-11-18 DIAGNOSIS — E66.01 CLASS 3 SEVERE OBESITY DUE TO EXCESS CALORIES WITHOUT SERIOUS COMORBIDITY WITH BODY MASS INDEX (BMI) OF 40.0 TO 44.9 IN ADULT (H): ICD-10-CM

## 2024-11-18 DIAGNOSIS — Z00.00 ROUTINE GENERAL MEDICAL EXAMINATION AT A HEALTH CARE FACILITY: Primary | ICD-10-CM

## 2024-11-18 DIAGNOSIS — E66.813 CLASS 3 SEVERE OBESITY DUE TO EXCESS CALORIES WITHOUT SERIOUS COMORBIDITY WITH BODY MASS INDEX (BMI) OF 40.0 TO 44.9 IN ADULT (H): ICD-10-CM

## 2024-11-18 DIAGNOSIS — L70.0 ACNE VULGARIS: ICD-10-CM

## 2024-11-18 DIAGNOSIS — R10.13 EPIGASTRIC PAIN: ICD-10-CM

## 2024-11-18 PROBLEM — Z90.710 S/P TOTAL HYSTERECTOMY: Status: ACTIVE | Noted: 2024-11-18

## 2024-11-18 LAB
ALBUMIN SERPL BCG-MCNC: 4.3 G/DL (ref 3.5–5.2)
ALP SERPL-CCNC: 53 U/L (ref 40–150)
ALT SERPL W P-5'-P-CCNC: 30 U/L (ref 0–50)
ANION GAP SERPL CALCULATED.3IONS-SCNC: 10 MMOL/L (ref 7–15)
AST SERPL W P-5'-P-CCNC: 20 U/L (ref 0–45)
BASOPHILS # BLD AUTO: 0.1 10E3/UL (ref 0–0.2)
BASOPHILS NFR BLD AUTO: 1 %
BILIRUB SERPL-MCNC: 0.4 MG/DL
BUN SERPL-MCNC: 9.2 MG/DL (ref 6–20)
CALCIUM SERPL-MCNC: 9.3 MG/DL (ref 8.8–10.4)
CHLORIDE SERPL-SCNC: 106 MMOL/L (ref 98–107)
CHOLEST SERPL-MCNC: 214 MG/DL
CREAT SERPL-MCNC: 0.64 MG/DL (ref 0.51–0.95)
EGFRCR SERPLBLD CKD-EPI 2021: >90 ML/MIN/1.73M2
EOSINOPHIL # BLD AUTO: 0.1 10E3/UL (ref 0–0.7)
EOSINOPHIL NFR BLD AUTO: 2 %
ERYTHROCYTE [DISTWIDTH] IN BLOOD BY AUTOMATED COUNT: 12.6 % (ref 10–15)
FASTING STATUS PATIENT QL REPORTED: YES
FASTING STATUS PATIENT QL REPORTED: YES
GLUCOSE SERPL-MCNC: 100 MG/DL (ref 70–99)
HCO3 SERPL-SCNC: 25 MMOL/L (ref 22–29)
HCT VFR BLD AUTO: 42.3 % (ref 35–47)
HDLC SERPL-MCNC: 57 MG/DL
HGB BLD-MCNC: 14.2 G/DL (ref 11.7–15.7)
IMM GRANULOCYTES # BLD: 0 10E3/UL
IMM GRANULOCYTES NFR BLD: 0 %
LDLC SERPL CALC-MCNC: 142 MG/DL
LYMPHOCYTES # BLD AUTO: 2.2 10E3/UL (ref 0.8–5.3)
LYMPHOCYTES NFR BLD AUTO: 38 %
MCH RBC QN AUTO: 30.5 PG (ref 26.5–33)
MCHC RBC AUTO-ENTMCNC: 33.6 G/DL (ref 31.5–36.5)
MCV RBC AUTO: 91 FL (ref 78–100)
MONOCYTES # BLD AUTO: 0.4 10E3/UL (ref 0–1.3)
MONOCYTES NFR BLD AUTO: 7 %
NEUTROPHILS # BLD AUTO: 2.9 10E3/UL (ref 1.6–8.3)
NEUTROPHILS NFR BLD AUTO: 51 %
NONHDLC SERPL-MCNC: 157 MG/DL
PLATELET # BLD AUTO: 280 10E3/UL (ref 150–450)
POTASSIUM SERPL-SCNC: 4.1 MMOL/L (ref 3.4–5.3)
PROT SERPL-MCNC: 7 G/DL (ref 6.4–8.3)
RBC # BLD AUTO: 4.66 10E6/UL (ref 3.8–5.2)
SODIUM SERPL-SCNC: 141 MMOL/L (ref 135–145)
TRIGL SERPL-MCNC: 73 MG/DL
VIT D+METAB SERPL-MCNC: 22 NG/ML (ref 20–50)
WBC # BLD AUTO: 5.7 10E3/UL (ref 4–11)

## 2024-11-18 PROCEDURE — 80053 COMPREHEN METABOLIC PANEL: CPT | Performed by: NURSE PRACTITIONER

## 2024-11-18 PROCEDURE — 99214 OFFICE O/P EST MOD 30 MIN: CPT | Mod: 25 | Performed by: NURSE PRACTITIONER

## 2024-11-18 PROCEDURE — 80061 LIPID PANEL: CPT | Performed by: NURSE PRACTITIONER

## 2024-11-18 PROCEDURE — 82306 VITAMIN D 25 HYDROXY: CPT | Performed by: NURSE PRACTITIONER

## 2024-11-18 PROCEDURE — 99395 PREV VISIT EST AGE 18-39: CPT | Performed by: NURSE PRACTITIONER

## 2024-11-18 PROCEDURE — 36415 COLL VENOUS BLD VENIPUNCTURE: CPT | Performed by: NURSE PRACTITIONER

## 2024-11-18 PROCEDURE — 85025 COMPLETE CBC W/AUTO DIFF WBC: CPT | Performed by: NURSE PRACTITIONER

## 2024-11-18 RX ORDER — CLINDAMYCIN PHOSPHATE 10 UG/ML
LOTION TOPICAL 2 TIMES DAILY
Qty: 60 ML | Refills: 4 | Status: SHIPPED | OUTPATIENT
Start: 2024-11-18

## 2024-11-18 ASSESSMENT — PAIN SCALES - GENERAL: PAINLEVEL_OUTOF10: NO PAIN (0)

## 2024-11-18 NOTE — PROGRESS NOTES
"Preventive Care Visit  [facility]  NARAYAN Nieves [facility]  Nov 18, 2024      Assessment & Plan     Routine general medical examination at a health care facility  -next preventative care visit in one year.     Epigastric pain  -Recommend keeping a food diary and diary of symptoms to identify potential triggers. Discussed possible differentials including gastritis or IBS. Symptoms seem most consistent with IBS given change in bowel movements- can try enteric coated peppermint oil such as Ibguard or soluble fiber supplement such as found in Benefiber.   - Comprehensive metabolic panel  - CBC with Platelets & Differential    Class 3 severe obesity due to excess calories without serious comorbidity with body mass index (BMI) of 40.0 to 44.9 in adult (H)    - Vitamin D Deficiency  - Lipid panel reflex to direct LDL Fasting    Acne vulgaris  -already using Hibiclens on trunk/under breasts which has improved symptoms but still struggling with cysts and comedones, will add clindamycin lotion  - clindamycin (CLEOCIN T) 1 % external lotion; Apply topically 2 times daily.  -erythema on face appearance looks like skin barrier dysfunction from over use of harsh cleansers, recommend Cerave Hydrating Facial Cleanser daily with Cerave moisturizing cream.     Patient has been advised of split billing requirements and indicates understanding: Yes        BMI  Estimated body mass index is 42.97 kg/m  as calculated from the following:    Height as of this encounter: 1.74 m (5' 8.5\").    Weight as of this encounter: 130.1 kg (286 lb 12.8 oz).   Weight management plan: Discussed healthy diet and exercise guidelines    Counseling  Appropriate preventive services were addressed with this patient via screening, questionnaire, or discussion as appropriate for fall prevention, nutrition, physical activity, Tobacco-use cessation, social engagement, weight loss and cognition.  Checklist reviewing " preventive services available has been given to the patient.  Reviewed patient's diet, addressing concerns and/or questions.   She is at risk for lack of exercise and has been provided with information to increase physical activity for the benefit of her well-being.           Subjective   Naheed is a 34 year old, presenting for the following:  Physical        11/18/2024     7:41 AM   Additional Questions   Roomed by Merced MONTEIRO   Accompanied by self      Patient here for yearly preventative care visit. In addition, they have the following concerns:    1. Epigastric stomachaches. Wondering about lactose intolerance. Stopped dairy products completely. Sometimes still get stomach aches. Will occasionally try dairy and will not get a stomach ache. Stomach ache will only occur after eating usually within an hour. Pain is sometimes sharp, sometimes dull. Sometimes will get diarrhea. Has a bowel movement every other day. Stomach pain does happen less often on the dairy. One pad of butter on squash put her double over in pain. Gets gas pain, tried chewable dairy pills which didn't do much. No unexplained weight loss, nausea, or blood in stool.     2. Redness, dryness on cheeks and forehead, has been using aquaphor at night, other moisturizing creams. And an antifungal face wash.       Occupation: , salon owner     Fasting visit    Health Care Directive  Patient does not have a Health Care Directive: Discussed advance care planning with patient; however, patient declined at this time.      11/13/2024   General Health   How would you rate your overall physical health? Good   Feel stress (tense, anxious, or unable to sleep) Not at all            11/13/2024   Nutrition   Three or more servings of calcium each day? (!) I DON'T KNOW   Diet: Regular (no restrictions)   How many servings of fruit and vegetables per day? (!) 2-3   How many sweetened beverages each day? 0-1            11/13/2024   Exercise   Days per week  of moderate/strenous exercise 1 day   Average minutes spent exercising at this level 10 min      (!) EXERCISE CONCERN      2024   Social Factors   Frequency of gathering with friends or relatives More than three times a week   Worry food won't last until get money to buy more No   Food not last or not have enough money for food? No   Do you have housing? (Housing is defined as stable permanent housing and does not include staying ouside in a car, in a tent, in an abandoned building, in an overnight shelter, or couch-surfing.) Yes   Are you worried about losing your housing? No   Lack of transportation? No   Unable to get utilities (heat,electricity)? No            2024   Dental   Dentist two times every year? Yes            2024   TB Screening   Were you born outside of the [place]? No            Today's PHQ-2 Score:       2024     7:40 AM   PHQ-2 (  [employer])   Q1: Little interest or pleasure in doing things 0    Q2: Feeling down, depressed or hopeless 0    PHQ-2 Score 0    Q1: Little interest or pleasure in doing things Not at all   Q2: Feeling down, depressed or hopeless Not at all   PHQ-2 Score 0       Patient-reported           2024   Substance Use   Alcohol more than 3/day or more than 7/wk No   Do you use any other substances recreationally? No        Social History     Tobacco Use    Smoking status: Former     Current packs/day: 0.00     Average packs/day: 0.5 packs/day for 14.0 years (7.0 ttl pk-yrs)     Types: Cigarettes     Start date: 2003     Quit date: 2017     Years since quittin.0    Smokeless tobacco: Never   Vaping Use    Vaping status: Never Used   Substance Use Topics    Alcohol use: No     Comment: rare, couple times a year    Drug use: No           2022   LAST FHS-7 RESULTS   1st degree relative breast or ovarian cancer No    Any relative bilateral breast cancer Unknown    Any male have breast cancer No    Any ONE woman have BOTH breast AND  "ovarian cancer Unknown    Any woman with breast cancer before 50yrs Unknown    2 or more relatives with breast AND/OR ovarian cancer Unknown    2 or more relatives with breast AND/OR bowel cancer Unknown        Patient-reported       Mammogram Screening - Patient under 40 years of age: Routine Mammogram Screening not recommended.         11/13/2024   STI Screening   New sexual partner(s) since last STI/HIV test? No        History of abnormal Pap smear: Status post hysterectomy with removal of cervix and no history of CIN2 or greater or cervical cancer. Health Maintenance and Surgical History updated.        Latest Ref Rng & Units 7/7/2022    10:46 AM 6/16/2021     9:14 AM 6/16/2021     9:11 AM   PAP / HPV   PAP  Negative for Intraepithelial Lesion or Malignancy (NILM)      PAP (Historical)    NIL    HPV 16 DNA Negative Negative  Negative     HPV 18 DNA Negative Negative  Negative     Other HR HPV Negative Negative  Negative           Reviewed and updated as needed this visit by Provider   Tobacco  Allergies  Meds  Problems    Fam Hx  Soc Hx Sexual Activity               Objective    Exam  /83   Pulse 83   Temp 98.1  F (36.7  C) (Tympanic)   Resp 20   Ht 1.74 m (5' 8.5\")   Wt 130.1 kg (286 lb 12.8 oz)   LMP 11/05/2022 (Approximate)   SpO2 98%   BMI 42.97 kg/m     Estimated body mass index is 42.97 kg/m  as calculated from the following:    Height as of this encounter: 1.74 m (5' 8.5\").    Weight as of this encounter: 130.1 kg (286 lb 12.8 oz).    Physical Exam  Constitutional:       Appearance: Normal appearance. She is obese. She is not ill-appearing.   HENT:      Right Ear: Tympanic membrane, ear canal and external ear normal.      Left Ear: Tympanic membrane, ear canal and external ear normal.      Nose: Nose normal. No congestion.      Mouth/Throat:      Mouth: Mucous membranes are moist.      Pharynx: Oropharynx is clear.   Eyes:      Pupils: Pupils are equal, round, and reactive to light. "   Cardiovascular:      Rate and Rhythm: Normal rate and regular rhythm.      Pulses: Normal pulses.      Heart sounds: Normal heart sounds. No murmur heard.     No friction rub. No gallop.   Pulmonary:      Effort: Pulmonary effort is normal.      Breath sounds: Normal breath sounds.   Chest:   Breasts:     Right: Normal.      Left: Normal.      Comments: Multiple cysts, comedomes, various stages of healing   Abdominal:      General: Abdomen is flat. Bowel sounds are normal.      Palpations: Abdomen is soft.   Musculoskeletal:         General: Normal range of motion.      Cervical back: Normal range of motion.   Lymphadenopathy:      Cervical: No cervical adenopathy.   Skin:     General: Skin is warm and dry.      Findings: Erythema present.          Neurological:      General: No focal deficit present.      Mental Status: She is alert and oriented to person, place, and time.   Psychiatric:         Mood and Affect: Mood normal.         Behavior: Behavior normal.         Thought Content: Thought content normal.         Judgment: Judgment normal.               Signed Electronically by: NARAYAN Nieves CNP

## 2024-11-18 NOTE — PATIENT INSTRUCTIONS
Cerave Hydrating Facial Cleanser daily with Cerave moisturizing cream.     Try keep food diary and diary of symptoms- looks for IBS apps.     Enteric coated peppermint oil capsules- brand name Cheryl    Patient Education   Preventive Care Advice   This is general advice given by our system to help you stay healthy. However, your care team may have specific advice just for you. Please talk to your care team about your preventive care needs.  Nutrition  Eat 5 or more servings of fruits and vegetables each day.  Try wheat bread, brown rice and whole grain pasta (instead of white bread, rice, and pasta).  Get enough calcium and vitamin D. Check the label on foods and aim for 100% of the RDA (recommended daily allowance).  Lifestyle  Exercise at least 150 minutes each week  (30 minutes a day, 5 days a week).  Do muscle strengthening activities 2 days a week. These help control your weight and prevent disease.  No smoking.  Wear sunscreen to prevent skin cancer.  Have a dental exam and cleaning every 6 months.  Yearly exams  See your health care team every year to talk about:  Any changes in your health.  Any medicines your care team has prescribed.  Preventive care, family planning, and ways to prevent chronic diseases.  Shots (vaccines)   HPV shots (up to age 26), if you've never had them before.  Hepatitis B shots (up to age 59), if you've never had them before.  COVID-19 shot: Get this shot when it's due.  Flu shot: Get a flu shot every year.  Tetanus shot: Get a tetanus shot every 10 years.  Pneumococcal, hepatitis A, and RSV shots: Ask your care team if you need these based on your risk.  Shingles shot (for age 50 and up)  General health tests  Diabetes screening:  Starting at age 35, Get screened for diabetes at least every 3 years.  If you are younger than age 35, ask your care team if you should be screened for diabetes.  Cholesterol test: At age 39, start having a cholesterol test every 5 years, or more often  if advised.  Bone density scan (DEXA): At age 50, ask your care team if you should have this scan for osteoporosis (brittle bones).  Hepatitis C: Get tested at least once in your life.  STIs (sexually transmitted infections)  Before age 24: Ask your care team if you should be screened for STIs.  After age 24: Get screened for STIs if you're at risk. You are at risk for STIs (including HIV) if:  You are sexually active with more than one person.  You don't use condoms every time.  You or a partner was diagnosed with a sexually transmitted infection.  If you are at risk for HIV, ask about PrEP medicine to prevent HIV.  Get tested for HIV at least once in your life, whether you are at risk for HIV or not.  Cancer screening tests  Cervical cancer screening: If you have a cervix, begin getting regular cervical cancer screening tests starting at age 21.  Breast cancer scan (mammogram): If you've ever had breasts, begin having regular mammograms starting at age 40. This is a scan to check for breast cancer.  Colon cancer screening: It is important to start screening for colon cancer at age 45.  Have a colonoscopy test every 10 years (or more often if you're at risk) Or, ask your provider about stool tests like a FIT test every year or Cologuard test every 3 years.  To learn more about your testing options, visit:   .  For help making a decision, visit:   https://bit.ly/sh84234.  Prostate cancer screening test: If you have a prostate, ask your care team if a prostate cancer screening test (PSA) at age 55 is right for you.  Lung cancer screening: If you are a current or former smoker ages 50 to 80, ask your care team if ongoing lung cancer screenings are right for you.  For informational purposes only. Not to replace the advice of your health care provider. Copyright   2023 [employer]. All rights reserved. Clinically reviewed by the [facility]. [employer] [id number] - REV 01/24.

## (undated) DEVICE — GLOVE ESTEEM POWDER FREE SMT 6.5  2D72PT65

## (undated) DEVICE — SEAL SET MYOSURE ROD LENS SCOPE SINGLE USE 40-902

## (undated) DEVICE — SOL NACL 0.9% INJ 1000ML BAG 07983-09

## (undated) DEVICE — NDL SPINAL 22GA 3.5" QUINCKE 405181

## (undated) DEVICE — BASIN SET MAJOR

## (undated) DEVICE — PREP SKIN SCRUB TRAY 4461A

## (undated) DEVICE — PREP CHLORAPREP 26ML TINTED ORANGE  260815

## (undated) DEVICE — STRAP KNEE/BODY 31143004

## (undated) DEVICE — KIT PROCEDURE FLUENT IN/OUT FLOWPAK TISS TRAP FLT-112S

## (undated) DEVICE — SYR 50ML LL W/O NDL 309653

## (undated) DEVICE — ESU ABLATION NOVASURE W/SURESOUND NS2007

## (undated) DEVICE — SU VICRYL 0 CT-1 36" J346H

## (undated) DEVICE — SU VICRYL 3-0 CTX 36" UND J980H

## (undated) DEVICE — SOL WATER IRRIG 1000ML BOTTLE 07139-09

## (undated) DEVICE — DRAPE GYN/UROLOGY FLUID POUCH TUR 29455

## (undated) DEVICE — SPECIMEN CONTAINER W/10% BUFFERED FORMALIN 120ML 591201

## (undated) DEVICE — SUCTION CANISTER DOLPHIN 3000ML

## (undated) DEVICE — ESU GROUND PAD UNIVERSAL W/O CORD

## (undated) DEVICE — GLOVE PROTEXIS BLUE W/NEU-THERA 6.5  2D73EB65

## (undated) DEVICE — STOCKING SLEEVE COMPRESSION CALF LG

## (undated) DEVICE — SPONGE LAP 18X18" 1515

## (undated) DEVICE — GLOVE PROTEXIS W/NEU-THERA 7.5  2D73TE75

## (undated) DEVICE — DRAPE SETUP C-SECTION INVISISHIELD 54X90" DYNJE5600

## (undated) DEVICE — SOL NACL 0.9% IRRIG 1000ML BOTTLE 07138-09

## (undated) DEVICE — KIT ENDO FIRST STEP DISINFECTANT 200ML W/POUCH EP-4

## (undated) DEVICE — NDL 19GA 1.5"

## (undated) DEVICE — PAD PERI W/WINGS 1580A

## (undated) DEVICE — PACK MINOR SBA15MIFSE

## (undated) DEVICE — SU MONOCRYL 0 CT-1 36" Y346H

## (undated) DEVICE — SU MONOCRYL 4-0 PS-2 18" UND Y496G

## (undated) DEVICE — PACK C-SECTION LF PL15OTA83B

## (undated) DEVICE — SU MONOCRYL 0 CTB-1 36" YB946

## (undated) DEVICE — SUCTION CANISTER MEDIVAC LINER 1500ML W/LID 65651-515

## (undated) DEVICE — CATH TRAY FOLEY 16FR SILICONE 907416

## (undated) RX ORDER — ACETAMINOPHEN 325 MG/1
TABLET ORAL
Status: DISPENSED
Start: 2022-10-04

## (undated) RX ORDER — ONDANSETRON 2 MG/ML
INJECTION INTRAMUSCULAR; INTRAVENOUS
Status: DISPENSED
Start: 2017-12-15

## (undated) RX ORDER — FENTANYL CITRATE 50 UG/ML
INJECTION, SOLUTION INTRAMUSCULAR; INTRAVENOUS
Status: DISPENSED
Start: 2017-12-15

## (undated) RX ORDER — PROPOFOL 10 MG/ML
INJECTION, EMULSION INTRAVENOUS
Status: DISPENSED
Start: 2017-12-15

## (undated) RX ORDER — LIDOCAINE HYDROCHLORIDE 20 MG/ML
INJECTION, SOLUTION EPIDURAL; INFILTRATION; INTRACAUDAL; PERINEURAL
Status: DISPENSED
Start: 2022-10-04

## (undated) RX ORDER — ONDANSETRON 2 MG/ML
INJECTION INTRAMUSCULAR; INTRAVENOUS
Status: DISPENSED
Start: 2022-10-04

## (undated) RX ORDER — OXYTOCIN/0.9 % SODIUM CHLORIDE 30/500 ML
PLASTIC BAG, INJECTION (ML) INTRAVENOUS
Status: DISPENSED
Start: 2017-12-15

## (undated) RX ORDER — PROPOFOL 10 MG/ML
INJECTION, EMULSION INTRAVENOUS
Status: DISPENSED
Start: 2022-10-04

## (undated) RX ORDER — FENTANYL CITRATE 50 UG/ML
INJECTION, SOLUTION INTRAMUSCULAR; INTRAVENOUS
Status: DISPENSED
Start: 2022-10-04

## (undated) RX ORDER — KETOROLAC TROMETHAMINE 30 MG/ML
INJECTION, SOLUTION INTRAMUSCULAR; INTRAVENOUS
Status: DISPENSED
Start: 2022-10-04

## (undated) RX ORDER — OXYCODONE HYDROCHLORIDE 5 MG/1
TABLET ORAL
Status: DISPENSED
Start: 2022-10-04